# Patient Record
Sex: FEMALE | Race: WHITE | NOT HISPANIC OR LATINO | URBAN - METROPOLITAN AREA
[De-identification: names, ages, dates, MRNs, and addresses within clinical notes are randomized per-mention and may not be internally consistent; named-entity substitution may affect disease eponyms.]

---

## 2017-03-12 ENCOUNTER — EMERGENCY (EMERGENCY)
Facility: HOSPITAL | Age: 82
LOS: 1 days | Discharge: HOME | End: 2017-03-12
Attending: EMERGENCY MEDICINE | Admitting: EMERGENCY MEDICINE
Payer: MEDICARE

## 2017-03-12 VITALS
RESPIRATION RATE: 18 BRPM | SYSTOLIC BLOOD PRESSURE: 191 MMHG | HEIGHT: 62 IN | HEART RATE: 82 BPM | TEMPERATURE: 98 F | DIASTOLIC BLOOD PRESSURE: 82 MMHG | WEIGHT: 149.91 LBS | OXYGEN SATURATION: 99 %

## 2017-03-12 DIAGNOSIS — Z79.2 LONG TERM (CURRENT) USE OF ANTIBIOTICS: ICD-10-CM

## 2017-03-12 DIAGNOSIS — S39.012A STRAIN OF MUSCLE, FASCIA AND TENDON OF LOWER BACK, INITIAL ENCOUNTER: ICD-10-CM

## 2017-03-12 DIAGNOSIS — S39.92XA UNSPECIFIED INJURY OF LOWER BACK, INITIAL ENCOUNTER: ICD-10-CM

## 2017-03-12 DIAGNOSIS — Z79.899 OTHER LONG TERM (CURRENT) DRUG THERAPY: ICD-10-CM

## 2017-03-12 DIAGNOSIS — Z79.1 LONG TERM (CURRENT) USE OF NON-STEROIDAL ANTI-INFLAMMATORIES (NSAID): ICD-10-CM

## 2017-03-12 DIAGNOSIS — E11.9 TYPE 2 DIABETES MELLITUS WITHOUT COMPLICATIONS: ICD-10-CM

## 2017-03-12 LAB
APPEARANCE UR: CLEAR — SIGNIFICANT CHANGE UP
BILIRUB UR-MCNC: NEGATIVE — SIGNIFICANT CHANGE UP
COLOR SPEC: YELLOW — SIGNIFICANT CHANGE UP
DIFF PNL FLD: NEGATIVE — SIGNIFICANT CHANGE UP
GLUCOSE UR QL: NEGATIVE — SIGNIFICANT CHANGE UP
KETONES UR-MCNC: NEGATIVE — SIGNIFICANT CHANGE UP
LEUKOCYTE ESTERASE UR-ACNC: (no result)
NITRITE UR-MCNC: NEGATIVE — SIGNIFICANT CHANGE UP
PH UR: 6.5 — SIGNIFICANT CHANGE UP (ref 4–8.1)
PROT UR-MCNC: NEGATIVE MG/DL — SIGNIFICANT CHANGE UP
SP GR SPEC: <=1.005 — SIGNIFICANT CHANGE UP (ref 1–1.03)
UROBILINOGEN FLD QL: 0.2 E.U./DL — SIGNIFICANT CHANGE UP

## 2017-03-12 PROCEDURE — 73502 X-RAY EXAM HIP UNI 2-3 VIEWS: CPT | Mod: 26

## 2017-03-12 PROCEDURE — 72100 X-RAY EXAM L-S SPINE 2/3 VWS: CPT | Mod: 26

## 2017-03-12 PROCEDURE — 72170 X-RAY EXAM OF PELVIS: CPT | Mod: 26

## 2017-03-12 PROCEDURE — 99284 EMERGENCY DEPT VISIT MOD MDM: CPT | Mod: 25

## 2017-03-12 RX ORDER — IBUPROFEN 200 MG
400 TABLET ORAL ONCE
Qty: 0 | Refills: 0 | Status: COMPLETED | OUTPATIENT
Start: 2017-03-12 | End: 2017-03-12

## 2017-03-12 RX ORDER — TRAMADOL HYDROCHLORIDE 50 MG/1
50 TABLET ORAL ONCE
Qty: 0 | Refills: 0 | Status: DISCONTINUED | OUTPATIENT
Start: 2017-03-12 | End: 2017-03-12

## 2017-03-12 RX ORDER — TRAMADOL HYDROCHLORIDE 50 MG/1
1 TABLET ORAL
Qty: 16 | Refills: 0 | OUTPATIENT
Start: 2017-03-12 | End: 2017-03-16

## 2017-03-12 RX ADMIN — Medication 400 MILLIGRAM(S): at 21:19

## 2017-03-12 RX ADMIN — TRAMADOL HYDROCHLORIDE 50 MILLIGRAM(S): 50 TABLET ORAL at 21:19

## 2017-03-12 NOTE — ED ADULT NURSE NOTE - CHPI ED SYMPTOMS NEG
no neck tenderness/no tingling/no motor function loss/no bowel dysfunction/no bladder dysfunction/no numbness/no constipation

## 2017-03-12 NOTE — ED PROVIDER NOTE - OBJECTIVE STATEMENT
Patient with hx of osteoporosis, NIDDM, presents with L lower back pain, and leg pain. notes while walking her dogs earlier, she tripped forward, and over the course of the day, worsening lower back pain and soreness which radiates down the back of the leg. denies symptoms of UTI, sis dysuria, hematuria, flank pain, abd pain, NV//D, denies rash. notes prior similar back pain with osteoporosis. denies fever, chills, or urinary/bowel incontinence.

## 2017-03-12 NOTE — ED ADULT TRIAGE NOTE - CHIEF COMPLAINT QUOTE
here for lower back pain x 4 days- denies recent injury or trauma, dysuria - c/o injury from 2 years ago

## 2017-03-12 NOTE — ED PROVIDER NOTE - PROGRESS NOTE DETAILS
patient is weight bearing, with no neuro deficits, tolerating POs with no urinary symptoms, and no abd pain. will dc with analgesics. xrays consistent with diffuse djd. has follow up at [facility] .

## 2017-03-14 LAB
CULTURE RESULTS: SIGNIFICANT CHANGE UP
SPECIMEN SOURCE: SIGNIFICANT CHANGE UP

## 2017-07-14 ENCOUNTER — INPATIENT (INPATIENT)
Facility: HOSPITAL | Age: 82
LOS: 3 days | Discharge: HOME HEALTH | DRG: 470 | End: 2017-07-18
Attending: ORTHOPAEDIC SURGERY | Admitting: ORTHOPAEDIC SURGERY
Payer: MEDICARE

## 2017-07-14 VITALS
RESPIRATION RATE: 16 BRPM | DIASTOLIC BLOOD PRESSURE: 78 MMHG | TEMPERATURE: 98 F | HEART RATE: 97 BPM | OXYGEN SATURATION: 100 % | SYSTOLIC BLOOD PRESSURE: 199 MMHG

## 2017-07-14 LAB
ALBUMIN SERPL ELPH-MCNC: 4.1 G/DL — SIGNIFICANT CHANGE UP (ref 3.4–5)
ALP SERPL-CCNC: 108 U/L — SIGNIFICANT CHANGE UP (ref 40–120)
ALT FLD-CCNC: 22 U/L — SIGNIFICANT CHANGE UP (ref 12–42)
ANION GAP SERPL CALC-SCNC: 9 MMOL/L — SIGNIFICANT CHANGE UP (ref 9–16)
APPEARANCE UR: CLEAR — SIGNIFICANT CHANGE UP
APTT BLD: 26.4 SEC — LOW (ref 27.5–36.5)
AST SERPL-CCNC: 26 U/L — SIGNIFICANT CHANGE UP (ref 15–37)
BASOPHILS NFR BLD AUTO: 0.3 % — SIGNIFICANT CHANGE UP (ref 0–2)
BILIRUB SERPL-MCNC: 0.7 MG/DL — SIGNIFICANT CHANGE UP (ref 0.2–1.2)
BILIRUB UR-MCNC: NEGATIVE — SIGNIFICANT CHANGE UP
BUN SERPL-MCNC: 13 MG/DL — SIGNIFICANT CHANGE UP (ref 7–23)
CALCIUM SERPL-MCNC: 9.5 MG/DL — SIGNIFICANT CHANGE UP (ref 8.5–10.5)
CHLORIDE SERPL-SCNC: 102 MMOL/L — SIGNIFICANT CHANGE UP (ref 96–108)
CK MB BLD-MCNC: 1.67 % — SIGNIFICANT CHANGE UP
CK MB CFR SERPL CALC: 2.1 NG/ML — SIGNIFICANT CHANGE UP (ref 0.5–3.6)
CO2 SERPL-SCNC: 28 MMOL/L — SIGNIFICANT CHANGE UP (ref 22–31)
COLOR SPEC: YELLOW — SIGNIFICANT CHANGE UP
CREAT SERPL-MCNC: 0.87 MG/DL — SIGNIFICANT CHANGE UP (ref 0.5–1.3)
DIFF PNL FLD: (no result)
EOSINOPHIL NFR BLD AUTO: 0.1 % — SIGNIFICANT CHANGE UP (ref 0–6)
GLUCOSE SERPL-MCNC: 247 MG/DL — HIGH (ref 70–99)
GLUCOSE UR QL: 250
HCT VFR BLD CALC: 40 % — SIGNIFICANT CHANGE UP (ref 34.5–45)
HGB BLD-MCNC: 13.4 G/DL — SIGNIFICANT CHANGE UP (ref 11.5–15.5)
IMM GRANULOCYTES NFR BLD AUTO: 0.6 % — SIGNIFICANT CHANGE UP (ref 0–1.5)
INR BLD: 1 — SIGNIFICANT CHANGE UP (ref 0.88–1.16)
KETONES UR-MCNC: (no result) MG/DL
LEUKOCYTE ESTERASE UR-ACNC: NEGATIVE — SIGNIFICANT CHANGE UP
LYMPHOCYTES # BLD AUTO: 5 % — LOW (ref 13–44)
MCHC RBC-ENTMCNC: 28.6 PG — SIGNIFICANT CHANGE UP (ref 27–34)
MCHC RBC-ENTMCNC: 33.5 G/DL — SIGNIFICANT CHANGE UP (ref 32–36)
MCV RBC AUTO: 85.3 FL — SIGNIFICANT CHANGE UP (ref 80–100)
MONOCYTES NFR BLD AUTO: 2.6 % — SIGNIFICANT CHANGE UP (ref 2–14)
NEUTROPHILS NFR BLD AUTO: 91.4 % — HIGH (ref 43–77)
NITRITE UR-MCNC: NEGATIVE — SIGNIFICANT CHANGE UP
PH UR: 7.5 — SIGNIFICANT CHANGE UP (ref 5–8)
PLATELET # BLD AUTO: 271 K/UL — SIGNIFICANT CHANGE UP (ref 150–400)
POTASSIUM SERPL-MCNC: 3.6 MMOL/L — SIGNIFICANT CHANGE UP (ref 3.5–5.3)
POTASSIUM SERPL-SCNC: 3.6 MMOL/L — SIGNIFICANT CHANGE UP (ref 3.5–5.3)
PROT SERPL-MCNC: 7.8 G/DL — SIGNIFICANT CHANGE UP (ref 6.4–8.2)
PROT UR-MCNC: (no result) MG/DL
PROTHROM AB SERPL-ACNC: 11 SEC — SIGNIFICANT CHANGE UP (ref 9.8–12.7)
RBC # BLD: 4.69 M/UL — SIGNIFICANT CHANGE UP (ref 3.8–5.2)
RBC # FLD: 13.7 % — SIGNIFICANT CHANGE UP (ref 10.3–16.9)
SODIUM SERPL-SCNC: 139 MMOL/L — SIGNIFICANT CHANGE UP (ref 132–145)
SP GR SPEC: 1.01 — SIGNIFICANT CHANGE UP (ref 1–1.03)
TROPONIN I SERPL-MCNC: <0.017 NG/ML — LOW (ref 0.02–0.06)
UROBILINOGEN FLD QL: 0.2 E.U./DL — SIGNIFICANT CHANGE UP
WBC # BLD: 21.5 K/UL — HIGH (ref 3.8–10.5)
WBC # FLD AUTO: 21.5 K/UL — HIGH (ref 3.8–10.5)

## 2017-07-14 PROCEDURE — 93010 ELECTROCARDIOGRAM REPORT: CPT

## 2017-07-14 PROCEDURE — 71010: CPT | Mod: 26

## 2017-07-14 PROCEDURE — 73552 X-RAY EXAM OF FEMUR 2/>: CPT | Mod: 26,LT

## 2017-07-14 PROCEDURE — 73502 X-RAY EXAM HIP UNI 2-3 VIEWS: CPT | Mod: 26,LT

## 2017-07-14 PROCEDURE — 99285 EMERGENCY DEPT VISIT HI MDM: CPT | Mod: 25

## 2017-07-14 RX ORDER — ACETAMINOPHEN 500 MG
650 TABLET ORAL EVERY 6 HOURS
Qty: 0 | Refills: 0 | Status: DISCONTINUED | OUTPATIENT
Start: 2017-07-14 | End: 2017-07-18

## 2017-07-14 RX ORDER — HYDROMORPHONE HYDROCHLORIDE 2 MG/ML
0.5 INJECTION INTRAMUSCULAR; INTRAVENOUS; SUBCUTANEOUS ONCE
Qty: 0 | Refills: 0 | Status: DISCONTINUED | OUTPATIENT
Start: 2017-07-14 | End: 2017-07-14

## 2017-07-14 RX ORDER — MORPHINE SULFATE 50 MG/1
4 CAPSULE, EXTENDED RELEASE ORAL ONCE
Qty: 0 | Refills: 0 | Status: DISCONTINUED | OUTPATIENT
Start: 2017-07-14 | End: 2017-07-14

## 2017-07-14 RX ORDER — DOCUSATE SODIUM 100 MG
100 CAPSULE ORAL THREE TIMES A DAY
Qty: 0 | Refills: 0 | Status: DISCONTINUED | OUTPATIENT
Start: 2017-07-14 | End: 2017-07-18

## 2017-07-14 RX ORDER — MAGNESIUM HYDROXIDE 400 MG/1
30 TABLET, CHEWABLE ORAL DAILY
Qty: 0 | Refills: 0 | Status: DISCONTINUED | OUTPATIENT
Start: 2017-07-14 | End: 2017-07-18

## 2017-07-14 RX ORDER — OXYCODONE HYDROCHLORIDE 5 MG/1
5 TABLET ORAL EVERY 4 HOURS
Qty: 0 | Refills: 0 | Status: DISCONTINUED | OUTPATIENT
Start: 2017-07-14 | End: 2017-07-18

## 2017-07-14 RX ORDER — SODIUM CHLORIDE 9 MG/ML
500 INJECTION INTRAMUSCULAR; INTRAVENOUS; SUBCUTANEOUS ONCE
Qty: 0 | Refills: 0 | Status: COMPLETED | OUTPATIENT
Start: 2017-07-14 | End: 2017-07-14

## 2017-07-14 RX ORDER — SODIUM CHLORIDE 9 MG/ML
1000 INJECTION, SOLUTION INTRAVENOUS
Qty: 0 | Refills: 0 | Status: DISCONTINUED | OUTPATIENT
Start: 2017-07-14 | End: 2017-07-15

## 2017-07-14 RX ORDER — OXYCODONE HYDROCHLORIDE 5 MG/1
10 TABLET ORAL EVERY 4 HOURS
Qty: 0 | Refills: 0 | Status: DISCONTINUED | OUTPATIENT
Start: 2017-07-14 | End: 2017-07-18

## 2017-07-14 RX ORDER — MORPHINE SULFATE 50 MG/1
4 CAPSULE, EXTENDED RELEASE ORAL EVERY 4 HOURS
Qty: 0 | Refills: 0 | Status: DISCONTINUED | OUTPATIENT
Start: 2017-07-14 | End: 2017-07-18

## 2017-07-14 RX ADMIN — OXYCODONE HYDROCHLORIDE 5 MILLIGRAM(S): 5 TABLET ORAL at 22:39

## 2017-07-14 RX ADMIN — SODIUM CHLORIDE 500 MILLILITER(S): 9 INJECTION INTRAMUSCULAR; INTRAVENOUS; SUBCUTANEOUS at 17:34

## 2017-07-14 RX ADMIN — HYDROMORPHONE HYDROCHLORIDE 0.5 MILLIGRAM(S): 2 INJECTION INTRAMUSCULAR; INTRAVENOUS; SUBCUTANEOUS at 18:15

## 2017-07-14 RX ADMIN — HYDROMORPHONE HYDROCHLORIDE 0.5 MILLIGRAM(S): 2 INJECTION INTRAMUSCULAR; INTRAVENOUS; SUBCUTANEOUS at 19:39

## 2017-07-14 RX ADMIN — MORPHINE SULFATE 4 MILLIGRAM(S): 50 CAPSULE, EXTENDED RELEASE ORAL at 19:39

## 2017-07-14 RX ADMIN — OXYCODONE HYDROCHLORIDE 5 MILLIGRAM(S): 5 TABLET ORAL at 22:09

## 2017-07-14 RX ADMIN — MORPHINE SULFATE 4 MILLIGRAM(S): 50 CAPSULE, EXTENDED RELEASE ORAL at 16:44

## 2017-07-14 RX ADMIN — SODIUM CHLORIDE 500 MILLILITER(S): 9 INJECTION INTRAMUSCULAR; INTRAVENOUS; SUBCUTANEOUS at 18:14

## 2017-07-14 NOTE — H&P ADULT - HISTORY OF PRESENT ILLNESS
Patient is a fairly healthy 82 year old lady who sustained a fall today and landed on her Left hip. The fall was from ground level and since then the patient is not able to bear weight and has been in tremendous amount of pain.

## 2017-07-14 NOTE — H&P ADULT - NSHPPHYSICALEXAM_GEN_ALL_CORE
LE    WWP, BCR  DP/PT  Motor: EHL/FHL/GS/AT  Sensory: DP/SP/AT, MP/LP/S/S SILT  ROM: Painful ROM about L hip  Deformity: Shortened and externally rotated.

## 2017-07-14 NOTE — ED ADULT NURSE NOTE - OBJECTIVE STATEMENT
Pt came in c/o left hip pain s/p mechanical fall x2 hours prior to arrival. Pt reports pain 9/10, unable to move left leg, unable to ambulate. Pt denies LOC, no use of blood thinners, no hitting head, no CP/SOB, no dizziness or disorientation.

## 2017-07-14 NOTE — ED PROVIDER NOTE - DIAGNOSTIC INTERPRETATION
xray chest 1 v: no PTX, no infiltrates, no cardiomegaly  xray hip/pelvis: 4 view: impacted displced L subcapital fx  xray femur: 2v: subcapital fx, no soft tissue swelling, no other deformity of femur

## 2017-07-14 NOTE — ED PROVIDER NOTE - OBJECTIVE STATEMENT
PMHx DM, osteoporosis presents with L hip pain. s/p mechanical trip and fall in her apartment 2 hours prior to arrival. denies head trauma, LOC, dizziness, neck pain, back pain. patient was unable to bear weight after injury. denies paresthesias or focal weakness. unable to move L leg due to pain

## 2017-07-14 NOTE — H&P ADULT - ASSESSMENT
82 year old lady with L FNF  1. Admit to Ortho  2. Pre Op work up  3. Pre Op clearance.  4. NPO p MN  5. SCDs

## 2017-07-14 NOTE — H&P ADULT - NSHPLABSRESULTS_GEN_ALL_CORE
13.4   21.5  )-----------( 271      ( 14 Jul 2017 16:43 )             40.0       07-14    139  |  102  |  13  ----------------------------<  247<H>  3.6   |  28  |  0.87    Ca    9.5      14 Jul 2017 16:43    TPro  7.8  /  Alb  4.1  /  TBili  0.7  /  DBili  x   /  AST  26  /  ALT  22  /  AlkPhos  108  07-14

## 2017-07-14 NOTE — ED PROVIDER NOTE - ATTENDING CONTRIBUTION TO CARE
83 yo female s/p trip and fall with left hip pain.  exam: no head injury, aao x 3, +shortening/ext rotation left le with pt tenderness left greater trochanter, Distal nvi lle.  xray: +subcapital femur fx.  Plan: admit [facility] ortho.

## 2017-07-15 ENCOUNTER — TRANSCRIPTION ENCOUNTER (OUTPATIENT)
Age: 82
End: 2017-07-15

## 2017-07-15 DIAGNOSIS — S72.009A FRACTURE OF UNSPECIFIED PART OF NECK OF UNSPECIFIED FEMUR, INITIAL ENCOUNTER FOR CLOSED FRACTURE: ICD-10-CM

## 2017-07-15 DIAGNOSIS — E11.9 TYPE 2 DIABETES MELLITUS WITHOUT COMPLICATIONS: ICD-10-CM

## 2017-07-15 DIAGNOSIS — Z01.818 ENCOUNTER FOR OTHER PREPROCEDURAL EXAMINATION: ICD-10-CM

## 2017-07-15 DIAGNOSIS — D72.829 ELEVATED WHITE BLOOD CELL COUNT, UNSPECIFIED: ICD-10-CM

## 2017-07-15 LAB
ANION GAP SERPL CALC-SCNC: 12 MMOL/L — SIGNIFICANT CHANGE UP (ref 5–17)
BLD GP AB SCN SERPL QL: NEGATIVE — SIGNIFICANT CHANGE UP
BUN SERPL-MCNC: 11 MG/DL — SIGNIFICANT CHANGE UP (ref 7–23)
CALCIUM SERPL-MCNC: 8.8 MG/DL — SIGNIFICANT CHANGE UP (ref 8.4–10.5)
CHLORIDE SERPL-SCNC: 96 MMOL/L — SIGNIFICANT CHANGE UP (ref 96–108)
CO2 SERPL-SCNC: 24 MMOL/L — SIGNIFICANT CHANGE UP (ref 22–31)
CREAT SERPL-MCNC: 0.7 MG/DL — SIGNIFICANT CHANGE UP (ref 0.5–1.3)
GLUCOSE SERPL-MCNC: 168 MG/DL — HIGH (ref 70–99)
HCT VFR BLD CALC: 32.4 % — LOW (ref 34.5–45)
HCT VFR BLD CALC: 36.3 % — SIGNIFICANT CHANGE UP (ref 34.5–45)
HGB BLD-MCNC: 11.2 G/DL — LOW (ref 11.5–15.5)
HGB BLD-MCNC: 12 G/DL — SIGNIFICANT CHANGE UP (ref 11.5–15.5)
MCHC RBC-ENTMCNC: 28 PG — SIGNIFICANT CHANGE UP (ref 27–34)
MCHC RBC-ENTMCNC: 29 PG — SIGNIFICANT CHANGE UP (ref 27–34)
MCHC RBC-ENTMCNC: 33.1 G/DL — SIGNIFICANT CHANGE UP (ref 32–36)
MCHC RBC-ENTMCNC: 34.6 G/DL — SIGNIFICANT CHANGE UP (ref 32–36)
MCV RBC AUTO: 83.9 FL — SIGNIFICANT CHANGE UP (ref 80–100)
MCV RBC AUTO: 84.8 FL — SIGNIFICANT CHANGE UP (ref 80–100)
PLATELET # BLD AUTO: 220 K/UL — SIGNIFICANT CHANGE UP (ref 150–400)
PLATELET # BLD AUTO: 244 K/UL — SIGNIFICANT CHANGE UP (ref 150–400)
POTASSIUM SERPL-MCNC: 3.9 MMOL/L — SIGNIFICANT CHANGE UP (ref 3.5–5.3)
POTASSIUM SERPL-SCNC: 3.9 MMOL/L — SIGNIFICANT CHANGE UP (ref 3.5–5.3)
RBC # BLD: 3.86 M/UL — SIGNIFICANT CHANGE UP (ref 3.8–5.2)
RBC # BLD: 4.28 M/UL — SIGNIFICANT CHANGE UP (ref 3.8–5.2)
RBC # FLD: 13.8 % — SIGNIFICANT CHANGE UP (ref 10.3–16.9)
RBC # FLD: 14.1 % — SIGNIFICANT CHANGE UP (ref 10.3–16.9)
RH IG SCN BLD-IMP: POSITIVE — SIGNIFICANT CHANGE UP
SODIUM SERPL-SCNC: 132 MMOL/L — LOW (ref 135–145)
WBC # BLD: 13 K/UL — HIGH (ref 3.8–10.5)
WBC # BLD: 19.1 K/UL — HIGH (ref 3.8–10.5)
WBC # FLD AUTO: 13 K/UL — HIGH (ref 3.8–10.5)
WBC # FLD AUTO: 19.1 K/UL — HIGH (ref 3.8–10.5)

## 2017-07-15 PROCEDURE — 72170 X-RAY EXAM OF PELVIS: CPT | Mod: 26

## 2017-07-15 PROCEDURE — 99223 1ST HOSP IP/OBS HIGH 75: CPT | Mod: GC

## 2017-07-15 RX ORDER — DEXTROSE 50 % IN WATER 50 %
25 SYRINGE (ML) INTRAVENOUS ONCE
Qty: 0 | Refills: 0 | Status: DISCONTINUED | OUTPATIENT
Start: 2017-07-15 | End: 2017-07-18

## 2017-07-15 RX ORDER — SENNA PLUS 8.6 MG/1
2 TABLET ORAL AT BEDTIME
Qty: 0 | Refills: 0 | Status: DISCONTINUED | OUTPATIENT
Start: 2017-07-15 | End: 2017-07-18

## 2017-07-15 RX ORDER — ASPIRIN/CALCIUM CARB/MAGNESIUM 324 MG
325 TABLET ORAL
Qty: 0 | Refills: 0 | Status: DISCONTINUED | OUTPATIENT
Start: 2017-07-15 | End: 2017-07-18

## 2017-07-15 RX ORDER — POLYETHYLENE GLYCOL 3350 17 G/17G
17 POWDER, FOR SOLUTION ORAL DAILY
Qty: 0 | Refills: 0 | Status: DISCONTINUED | OUTPATIENT
Start: 2017-07-15 | End: 2017-07-18

## 2017-07-15 RX ORDER — SODIUM CHLORIDE 9 MG/ML
1000 INJECTION, SOLUTION INTRAVENOUS
Qty: 0 | Refills: 0 | Status: DISCONTINUED | OUTPATIENT
Start: 2017-07-15 | End: 2017-07-18

## 2017-07-15 RX ORDER — BENZOCAINE AND MENTHOL 5; 1 G/100ML; G/100ML
1 LIQUID ORAL EVERY 4 HOURS
Qty: 0 | Refills: 0 | Status: DISCONTINUED | OUTPATIENT
Start: 2017-07-15 | End: 2017-07-18

## 2017-07-15 RX ORDER — GLUCAGON INJECTION, SOLUTION 0.5 MG/.1ML
1 INJECTION, SOLUTION SUBCUTANEOUS ONCE
Qty: 0 | Refills: 0 | Status: DISCONTINUED | OUTPATIENT
Start: 2017-07-15 | End: 2017-07-18

## 2017-07-15 RX ORDER — ACETAMINOPHEN 500 MG
650 TABLET ORAL EVERY 6 HOURS
Qty: 0 | Refills: 0 | Status: DISCONTINUED | OUTPATIENT
Start: 2017-07-15 | End: 2017-07-15

## 2017-07-15 RX ORDER — ONDANSETRON 8 MG/1
4 TABLET, FILM COATED ORAL EVERY 4 HOURS
Qty: 0 | Refills: 0 | Status: DISCONTINUED | OUTPATIENT
Start: 2017-07-15 | End: 2017-07-18

## 2017-07-15 RX ORDER — CEFAZOLIN SODIUM 1 G
2000 VIAL (EA) INJECTION EVERY 8 HOURS
Qty: 0 | Refills: 0 | Status: COMPLETED | OUTPATIENT
Start: 2017-07-15 | End: 2017-07-16

## 2017-07-15 RX ORDER — DEXTROSE 50 % IN WATER 50 %
12.5 SYRINGE (ML) INTRAVENOUS ONCE
Qty: 0 | Refills: 0 | Status: DISCONTINUED | OUTPATIENT
Start: 2017-07-15 | End: 2017-07-18

## 2017-07-15 RX ORDER — OXYCODONE HYDROCHLORIDE 5 MG/1
10 TABLET ORAL EVERY 4 HOURS
Qty: 0 | Refills: 0 | Status: DISCONTINUED | OUTPATIENT
Start: 2017-07-15 | End: 2017-07-15

## 2017-07-15 RX ORDER — CELECOXIB 200 MG/1
200 CAPSULE ORAL
Qty: 0 | Refills: 0 | Status: DISCONTINUED | OUTPATIENT
Start: 2017-07-15 | End: 2017-07-18

## 2017-07-15 RX ORDER — DOCUSATE SODIUM 100 MG
100 CAPSULE ORAL THREE TIMES A DAY
Qty: 0 | Refills: 0 | Status: DISCONTINUED | OUTPATIENT
Start: 2017-07-15 | End: 2017-07-15

## 2017-07-15 RX ORDER — MORPHINE SULFATE 50 MG/1
4 CAPSULE, EXTENDED RELEASE ORAL
Qty: 0 | Refills: 0 | Status: DISCONTINUED | OUTPATIENT
Start: 2017-07-15 | End: 2017-07-18

## 2017-07-15 RX ORDER — INSULIN LISPRO 100/ML
VIAL (ML) SUBCUTANEOUS AT BEDTIME
Qty: 0 | Refills: 0 | Status: DISCONTINUED | OUTPATIENT
Start: 2017-07-15 | End: 2017-07-15

## 2017-07-15 RX ORDER — INSULIN LISPRO 100/ML
VIAL (ML) SUBCUTANEOUS
Qty: 0 | Refills: 0 | Status: DISCONTINUED | OUTPATIENT
Start: 2017-07-15 | End: 2017-07-18

## 2017-07-15 RX ORDER — KETOROLAC TROMETHAMINE 30 MG/ML
30 SYRINGE (ML) INJECTION EVERY 6 HOURS
Qty: 0 | Refills: 0 | Status: DISCONTINUED | OUTPATIENT
Start: 2017-07-15 | End: 2017-07-16

## 2017-07-15 RX ORDER — BUPIVACAINE 13.3 MG/ML
20 INJECTION, SUSPENSION, LIPOSOMAL INFILTRATION ONCE
Qty: 0 | Refills: 0 | Status: DISCONTINUED | OUTPATIENT
Start: 2017-07-15 | End: 2017-07-18

## 2017-07-15 RX ORDER — OXYCODONE HYDROCHLORIDE 5 MG/1
5 TABLET ORAL EVERY 4 HOURS
Qty: 0 | Refills: 0 | Status: DISCONTINUED | OUTPATIENT
Start: 2017-07-15 | End: 2017-07-15

## 2017-07-15 RX ORDER — MAGNESIUM HYDROXIDE 400 MG/1
30 TABLET, CHEWABLE ORAL DAILY
Qty: 0 | Refills: 0 | Status: DISCONTINUED | OUTPATIENT
Start: 2017-07-15 | End: 2017-07-15

## 2017-07-15 RX ORDER — DEXTROSE 50 % IN WATER 50 %
1 SYRINGE (ML) INTRAVENOUS ONCE
Qty: 0 | Refills: 0 | Status: DISCONTINUED | OUTPATIENT
Start: 2017-07-15 | End: 2017-07-18

## 2017-07-15 RX ORDER — MORPHINE SULFATE 50 MG/1
4 CAPSULE, EXTENDED RELEASE ORAL EVERY 4 HOURS
Qty: 0 | Refills: 0 | Status: DISCONTINUED | OUTPATIENT
Start: 2017-07-15 | End: 2017-07-15

## 2017-07-15 RX ADMIN — Medication 325 MILLIGRAM(S): at 17:23

## 2017-07-15 RX ADMIN — Medication 100 MILLIGRAM(S): at 21:31

## 2017-07-15 RX ADMIN — Medication 30 MILLIGRAM(S): at 18:00

## 2017-07-15 RX ADMIN — CELECOXIB 200 MILLIGRAM(S): 200 CAPSULE ORAL at 18:00

## 2017-07-15 RX ADMIN — Medication 100 MILLIGRAM(S): at 18:47

## 2017-07-15 RX ADMIN — OXYCODONE HYDROCHLORIDE 10 MILLIGRAM(S): 5 TABLET ORAL at 02:11

## 2017-07-15 RX ADMIN — OXYCODONE HYDROCHLORIDE 10 MILLIGRAM(S): 5 TABLET ORAL at 02:39

## 2017-07-15 RX ADMIN — POLYETHYLENE GLYCOL 3350 17 GRAM(S): 17 POWDER, FOR SOLUTION ORAL at 17:20

## 2017-07-15 RX ADMIN — Medication 4: at 12:30

## 2017-07-15 RX ADMIN — Medication 30 MILLIGRAM(S): at 17:22

## 2017-07-15 RX ADMIN — CELECOXIB 200 MILLIGRAM(S): 200 CAPSULE ORAL at 17:23

## 2017-07-15 RX ADMIN — Medication 2: at 21:40

## 2017-07-15 RX ADMIN — Medication 2: at 17:20

## 2017-07-15 NOTE — DISCHARGE NOTE ADULT - CARE PLAN
Principal Discharge DX:	Hip fracture  Goal:	improvement after surgery  Instructions for follow-up, activity and diet:	see below

## 2017-07-15 NOTE — DISCHARGE NOTE ADULT - MEDICATION SUMMARY - MEDICATIONS TO STOP TAKING
I will STOP taking the medications listed below when I get home from the hospital:    Levaquin 500 mg oral tablet  -- 1 tab(s) by mouth every 24 hours  -- Avoid prolonged or excessive exposure to direct and/or artificial sunlight while taking this medication.  Do not take dairy products, antacids, or iron preparations within one hour of this medication.  Finish all this medication unless otherwise directed by prescriber.  May cause drowsiness or dizziness.  Medication should be taken with plenty of water.    Bromfed DM 2 mg-10 mg-30 mg/5 mL oral syrup  -- 5 milliliter(s) by mouth every 6 hours PRN cough/congestion  -- May cause drowsiness.  Alcohol may intensify this effect.  Use care when operating dangerous machinery.  Obtain medical advice before taking any non-prescription drugs as some may affect the action of this medication.    ibuprofen 200 mg oral capsule  -- 2 cap(s) by mouth every 6 hours, As Needed - for moderate pain  -- Chew tablets before swallowing  Do not take this drug if you are pregnant.  It is very important that you take or use this exactly as directed.  Do not skip doses or discontinue unless directed by your doctor.  May cause drowsiness or dizziness.  Obtain medical advice before taking any non-prescription drugs as some may affect the action of this medication.  Take with food or milk.    cephalexin 500 mg oral capsule  -- 1 cap(s) by mouth 2 times a day  -- Finish all this medication unless otherwise directed by prescriber.    traMADol 50 mg oral tablet  -- 1 tab(s) by mouth every 6 hours -for moderate pain MDD:4  -- Caution federal law prohibits the transfer of this drug to any person other  than the person for whom it was prescribed.  May cause drowsiness.  Alcohol may intensify this effect.  Use care when operating dangerous machinery.  Obtain medical advice before taking any non-prescription drugs as some may affect the action of this medication.

## 2017-07-15 NOTE — CONSULT NOTE ADULT - PROBLEM SELECTOR RECOMMENDATION 2
BS is uncontrolled and she does not measure her BS at home.   Hold oral agent and continue insulin sliding scale.  The aim is to maintain -180 and not below 70.   Advance diet postop and will adjust her regimen

## 2017-07-15 NOTE — DISCHARGE NOTE ADULT - CARE PROVIDER_API CALL
Omar William), Orthopaedic Surgery  [address] [place] [address]  Phone: [phone]  Fax: [phone]

## 2017-07-15 NOTE — CONSULT NOTE ADULT - SUBJECTIVE AND OBJECTIVE BOX
Patient is a 82y old  Female who presents with a chief complaint of L hip pain, L FNF Garden Type 4 (2017 22:09)      HPI:  Patient is a fairly healthy 82 year old lady who sustained a fall today and landed on her Left hip. The fall was from ground level and since then the patient is not able to bear weight and has been in tremendous amount of pain. (2017 22:09)      PAST MEDICAL & SURGICAL HISTORY:  Diabetes  No significant past surgical history      FAMILY HISTORY:  No pertinent family history in first degree relatives      SOCIAL HISTORY:  Smoking Status: [ ] Current, [ ] Former, [ ] Never  Pack Years:    MEDICATIONS:  Pulmonary:    Antimicrobials:    Anticoagulants:    Onc:    GI/:  magnesium hydroxide Suspension 30 milliLiter(s) Oral daily PRN  docusate sodium 100 milliGRAM(s) Oral three times a day    Endocrine:    Cardiac:    Other Medications:  lactated ringers. 1000 milliLiter(s) IV Continuous <Continuous>  acetaminophen   Tablet 650 milliGRAM(s) Oral every 6 hours PRN  acetaminophen   Tablet. 650 milliGRAM(s) Oral every 6 hours PRN  oxyCODONE    IR 10 milliGRAM(s) Oral every 4 hours PRN  oxyCODONE    IR 5 milliGRAM(s) Oral every 4 hours PRN  morphine  - Injectable 4 milliGRAM(s) IV Push every 4 hours PRN      Allergies    No Known Allergies    Intolerances        Vital Signs Last 24 Hrs  T(C): 37.3 (15 Jul 2017 05:10), Max: 37.3 (15 Jul 2017 05:10)  T(F): 99.2 (15 Jul 2017 05:10), Max: 99.2 (15 Jul 2017 05:10)  HR: 89 (15 Jul 2017 05:10) (86 - 97)  BP: 176/82 (15 Jul 2017 05:10) (168/82 - 199/78)  BP(mean): --  RR: 16 (15 Jul 2017 05:10) (16 - 18)  SpO2: 96% (15 Jul 2017 05:10) (96% - 100%)        LABS:      CBC Full  -  ( 2017 16:43 )  WBC Count : 21.5 K/uL  Hemoglobin : 13.4 g/dL  Hematocrit : 40.0 %  Platelet Count - Automated : 271 K/uL  Mean Cell Volume : 85.3 fL  Mean Cell Hemoglobin : 28.6 pg  Mean Cell Hemoglobin Concentration : 33.5 g/dL  Auto Neutrophil # : x  Auto Lymphocyte # : x  Auto Monocyte # : x  Auto Eosinophil # : x  Auto Basophil # : x  Auto Neutrophil % : 91.4 %  Auto Lymphocyte % : 5.0 %  Auto Monocyte % : 2.6 %  Auto Eosinophil % : 0.1 %  Auto Basophil % : 0.3 %        139  |  102  |  13  ----------------------------<  247<H>  3.6   |  28  |  0.87    Ca    9.5      2017 16:43    TPro  7.8  /  Alb  4.1  /  TBili  0.7  /  DBili  x   /  AST  26  /  ALT  22  /  AlkPhos  108      PT/INR - ( 2017 16:43 )   PT: 11.0 sec;   INR: 1.00          PTT - ( 2017 16:43 )  PTT:26.4 sec      Urinalysis Basic - ( 2017 18:17 )    Color: Yellow / Appearance: Clear / S.010 / pH: x  Gluc: x / Ketone: Trace mg/dL  / Bili: NEGATIVE / Urobili: 0.2 E.U./dL   Blood: x / Protein: Trace mg/dL / Nitrite: NEGATIVE   Leuk Esterase: NEGATIVE / RBC: < 5 /HPF / WBC < 5 /HPF   Sq Epi: x / Non Sq Epi: Rare /HPF / Bacteria: Few /HPF  EKG RSR normal  CXR  clear                RADIOLOGY & ADDITIONAL STUDIES (The following images were personally reviewed):

## 2017-07-15 NOTE — DISCHARGE NOTE ADULT - PATIENT PORTAL LINK FT
“You can access the Follow[employer] Patient Portal, offered by [facility], by registering with the following website: [email or web address]”

## 2017-07-15 NOTE — DISCHARGE NOTE ADULT - MEDICATION SUMMARY - MEDICATIONS TO TAKE
I will START or STAY ON the medications listed below when I get home from the hospital:    aspirin 325 mg oral delayed release tablet  -- 1 tab(s) by mouth 2 times a day for 4 weeks from date of surgery.  -- Indication: For clot prevention    meloxicam 15 mg oral tablet  -- 1 tab(s) by mouth once a day  -- Do not take this drug if you are pregnant.  Obtain medical advice before taking any non-prescription drugs as some may affect the action of this medication.  Take with food or milk.    -- Indication: For Pain    acetaminophen-oxycodone 325 mg-5 mg oral tablet  -- 1 to 2 tab(s) by mouth every 4 hours, As Needed MDD:eight  -- Caution federal law prohibits the transfer of this drug to any person other  than the person for whom it was prescribed.  May cause drowsiness.  Alcohol may intensify this effect.  Use care when operating dangerous machinery.  This prescription cannot be refilled.  This product contains acetaminophen.  Do not use  with any other product containing acetaminophen to prevent possible liver damage.  Using more of this medication than prescribed may cause serious breathing problems.    -- Indication: For Pain    senna oral tablet  -- 2 tab(s) by mouth once a day (at bedtime), As needed, Constipation  -- Indication: For constipation    docusate sodium 100 mg oral capsule  -- 1 cap(s) by mouth 3 times a day  -- Indication: For constipation I will START or STAY ON the medications listed below when I get home from the hospital:    aspirin 325 mg oral delayed release tablet  -- 1 tab(s) by mouth 2 times a day for 4 weeks from date of surgery.  -- Indication: For clot prevention    meloxicam 15 mg oral tablet  -- 1 tab(s) by mouth once a day  -- Do not take this drug if you are pregnant.  Obtain medical advice before taking any non-prescription drugs as some may affect the action of this medication.  Take with food or milk.    -- Indication: For Pain    acetaminophen-oxycodone 325 mg-5 mg oral tablet  -- 1 to 2 tab(s) by mouth every 4 hours, As Needed MDD:eight  -- Caution federal law prohibits the transfer of this drug to any person other  than the person for whom it was prescribed.  May cause drowsiness.  Alcohol may intensify this effect.  Use care when operating dangerous machinery.  This prescription cannot be refilled.  This product contains acetaminophen.  Do not use  with any other product containing acetaminophen to prevent possible liver damage.  Using more of this medication than prescribed may cause serious breathing problems.    -- Indication: For Pain    Aspirin Enteric Coated 325 mg oral delayed release tablet  -- 1 tab(s) by mouth 2 times a day  -- Swallow whole.  Do not crush.  Take with food or milk.    -- Indication: For clot prevention    senna oral tablet  -- 2 tab(s) by mouth once a day (at bedtime), As needed, Constipation  -- Indication: For constipation    docusate sodium 100 mg oral capsule  -- 1 cap(s) by mouth 3 times a day  -- Indication: For constipation    Colace sodium 100 mg oral capsule  -- 1 cap(s) by mouth 3 times a day, As Needed  -- Medication should be taken with plenty of water.    -- Indication: For constipation

## 2017-07-15 NOTE — DISCHARGE NOTE ADULT - HOSPITAL COURSE
Admitted  Surgery - Left hip myron 7/15/17  Perioperative abx  Pain control  DVT ppx  PT consult  Med consult

## 2017-07-15 NOTE — CONSULT NOTE ADULT - PROBLEM SELECTOR RECOMMENDATION 4
The patient's medical condition is optimized for surgery.  There is no contraindication for surgery.  There is no clinical evidence neither of angina, decompensated CHF, arrhthymias, nor valvular disease.   There is no limitation of exercise capacity.  MET is 7 ( bikes and walks daily) .  ASA class is2 .  More[place] cardiac risk factor is low .  DVT prophylaxis is indicated.  Pain control.  Early mobilization.  Avoid fluid overload.

## 2017-07-15 NOTE — DISCHARGE NOTE ADULT - ADDITIONAL INSTRUCTIONS
No strenuous activity, heavy lifting, driving, tub bathing, or returning to work until cleared by MD.  You may shower--dressing is waterproof.  Remove dressing after post op day 7, then leave incision open to air.  Follow up with Dr. William in his office in 2 weeks from surgery.  Any staples/sutures will be removed in 2 weeks.  If you don't have a bowel movement by post op day 3, then take Milk of Magnesia (over the counter).  If no bowel movement by at least post op day 5, then use a Dulcolax suppository (over the counter) and/or a Fleets enema--if still no bowel movement, call your MD.  Contact your doctor if you experience: fever greater than 101.5, chills, chest pain, difficulty breathing, bleeding, redness or heat around the incision.    Please follow up with your primary care provider. No strenuous activity, heavy lifting, driving, tub bathing, or returning to work until cleared by MD.  You may shower--dressing is waterproof.  Remove dressing after post op day 7, then leave incision open to air.  Please take the Aspirin for 4 weeks from the date of surgery to prevent blood clots.   Follow up with Dr. William in his office in 2 weeks from surgery.  Any staples/sutures will be removed in 2 weeks.  If you don't have a bowel movement by post op day 3, then take Milk of Magnesia (over the counter).  If no bowel movement by at least post op day 5, then use a Dulcolax suppository (over the counter) and/or a Fleets enema--if still no bowel movement, call your MD.  Contact your doctor if you experience: fever greater than 101.5, chills, chest pain, difficulty breathing, bleeding, redness or heat around the incision.    Please follow up with your primary care provider.

## 2017-07-16 LAB
ANION GAP SERPL CALC-SCNC: 12 MMOL/L — SIGNIFICANT CHANGE UP (ref 5–17)
APPEARANCE UR: CLEAR — SIGNIFICANT CHANGE UP
BILIRUB UR-MCNC: NEGATIVE — SIGNIFICANT CHANGE UP
BUN SERPL-MCNC: 14 MG/DL — SIGNIFICANT CHANGE UP (ref 7–23)
CALCIUM SERPL-MCNC: 8.4 MG/DL — SIGNIFICANT CHANGE UP (ref 8.4–10.5)
CHLORIDE SERPL-SCNC: 96 MMOL/L — SIGNIFICANT CHANGE UP (ref 96–108)
CO2 SERPL-SCNC: 24 MMOL/L — SIGNIFICANT CHANGE UP (ref 22–31)
COLOR SPEC: YELLOW — SIGNIFICANT CHANGE UP
CREAT SERPL-MCNC: 0.9 MG/DL — SIGNIFICANT CHANGE UP (ref 0.5–1.3)
DIFF PNL FLD: NEGATIVE — SIGNIFICANT CHANGE UP
GLUCOSE SERPL-MCNC: 146 MG/DL — HIGH (ref 70–99)
GLUCOSE UR QL: NEGATIVE — SIGNIFICANT CHANGE UP
HBA1C BLD-MCNC: 7.3 % — HIGH (ref 4–5.6)
HCT VFR BLD CALC: 29.6 % — LOW (ref 34.5–45)
HGB BLD-MCNC: 10.2 G/DL — LOW (ref 11.5–15.5)
KETONES UR-MCNC: NEGATIVE — SIGNIFICANT CHANGE UP
LEUKOCYTE ESTERASE UR-ACNC: NEGATIVE — SIGNIFICANT CHANGE UP
MCHC RBC-ENTMCNC: 29.1 PG — SIGNIFICANT CHANGE UP (ref 27–34)
MCHC RBC-ENTMCNC: 34.5 G/DL — SIGNIFICANT CHANGE UP (ref 32–36)
MCV RBC AUTO: 84.6 FL — SIGNIFICANT CHANGE UP (ref 80–100)
MRSA PCR RESULT.: NEGATIVE — SIGNIFICANT CHANGE UP
NITRITE UR-MCNC: NEGATIVE — SIGNIFICANT CHANGE UP
PH UR: 5 — SIGNIFICANT CHANGE UP (ref 5–8)
PLATELET # BLD AUTO: 190 K/UL — SIGNIFICANT CHANGE UP (ref 150–400)
POTASSIUM SERPL-MCNC: 3.9 MMOL/L — SIGNIFICANT CHANGE UP (ref 3.5–5.3)
POTASSIUM SERPL-SCNC: 3.9 MMOL/L — SIGNIFICANT CHANGE UP (ref 3.5–5.3)
PROT UR-MCNC: NEGATIVE MG/DL — SIGNIFICANT CHANGE UP
RBC # BLD: 3.5 M/UL — LOW (ref 3.8–5.2)
RBC # FLD: 14.4 % — SIGNIFICANT CHANGE UP (ref 10.3–16.9)
S AUREUS DNA NOSE QL NAA+PROBE: NEGATIVE — SIGNIFICANT CHANGE UP
SODIUM SERPL-SCNC: 132 MMOL/L — LOW (ref 135–145)
SP GR SPEC: <=1.005 — SIGNIFICANT CHANGE UP (ref 1–1.03)
UROBILINOGEN FLD QL: 0.2 E.U./DL — SIGNIFICANT CHANGE UP
WBC # BLD: 11.1 K/UL — HIGH (ref 3.8–10.5)
WBC # FLD AUTO: 11.1 K/UL — HIGH (ref 3.8–10.5)

## 2017-07-16 RX ORDER — ZALEPLON 10 MG
5 CAPSULE ORAL AT BEDTIME
Qty: 0 | Refills: 0 | Status: DISCONTINUED | OUTPATIENT
Start: 2017-07-16 | End: 2017-07-18

## 2017-07-16 RX ADMIN — Medication 5 MILLIGRAM(S): at 21:42

## 2017-07-16 RX ADMIN — Medication 30 MILLIGRAM(S): at 06:00

## 2017-07-16 RX ADMIN — Medication 325 MILLIGRAM(S): at 18:17

## 2017-07-16 RX ADMIN — CELECOXIB 200 MILLIGRAM(S): 200 CAPSULE ORAL at 13:00

## 2017-07-16 RX ADMIN — Medication 325 MILLIGRAM(S): at 05:42

## 2017-07-16 RX ADMIN — Medication 30 MILLIGRAM(S): at 05:42

## 2017-07-16 RX ADMIN — CELECOXIB 200 MILLIGRAM(S): 200 CAPSULE ORAL at 18:18

## 2017-07-16 RX ADMIN — CELECOXIB 200 MILLIGRAM(S): 200 CAPSULE ORAL at 12:28

## 2017-07-16 RX ADMIN — Medication 30 MILLIGRAM(S): at 00:41

## 2017-07-16 RX ADMIN — Medication 2: at 12:28

## 2017-07-16 RX ADMIN — POLYETHYLENE GLYCOL 3350 17 GRAM(S): 17 POWDER, FOR SOLUTION ORAL at 12:28

## 2017-07-16 RX ADMIN — Medication 100 MILLIGRAM(S): at 12:31

## 2017-07-16 RX ADMIN — Medication 100 MILLIGRAM(S): at 21:42

## 2017-07-16 RX ADMIN — OXYCODONE HYDROCHLORIDE 10 MILLIGRAM(S): 5 TABLET ORAL at 18:18

## 2017-07-16 RX ADMIN — CELECOXIB 200 MILLIGRAM(S): 200 CAPSULE ORAL at 19:00

## 2017-07-16 RX ADMIN — Medication 100 MILLIGRAM(S): at 05:42

## 2017-07-16 RX ADMIN — Medication 100 MILLIGRAM(S): at 00:41

## 2017-07-16 RX ADMIN — Medication 2: at 21:42

## 2017-07-16 RX ADMIN — OXYCODONE HYDROCHLORIDE 10 MILLIGRAM(S): 5 TABLET ORAL at 19:00

## 2017-07-16 NOTE — PHYSICAL THERAPY INITIAL EVALUATION ADULT - ADDITIONAL COMMENTS
daughter lives with her temporarily, otherwise alone through the day, elevator building 10th floor, no DME use at home. previously independent and able to walk through [place] with grandchildren.

## 2017-07-16 NOTE — PHYSICAL THERAPY INITIAL EVALUATION ADULT - RANGE OF MOTION EXAMINATION, REHAB EVAL
Right LE ROM was WFL (within functional limits)/bilateral upper extremity ROM was WFL (within functional limits)/left hip <1/2 range

## 2017-07-16 NOTE — PHYSICAL THERAPY INITIAL EVALUATION ADULT - PERTINENT HX OF CURRENT PROBLEM, REHAB EVAL
81 yo female with femoral neck fracture following fall s/p left hip hemiarthroplasty seen for PT POD # 1 for PT evaluation

## 2017-07-16 NOTE — PHYSICAL THERAPY INITIAL EVALUATION ADULT - PLANNED THERAPY INTERVENTIONS, PT EVAL
strengthening/balance training/transfer training/bed mobility training/postural re-education/gait training

## 2017-07-17 LAB
ANION GAP SERPL CALC-SCNC: 13 MMOL/L — SIGNIFICANT CHANGE UP (ref 5–17)
BUN SERPL-MCNC: 17 MG/DL — SIGNIFICANT CHANGE UP (ref 7–23)
CALCIUM SERPL-MCNC: 9 MG/DL — SIGNIFICANT CHANGE UP (ref 8.4–10.5)
CHLORIDE SERPL-SCNC: 97 MMOL/L — SIGNIFICANT CHANGE UP (ref 96–108)
CO2 SERPL-SCNC: 26 MMOL/L — SIGNIFICANT CHANGE UP (ref 22–31)
CREAT SERPL-MCNC: 0.9 MG/DL — SIGNIFICANT CHANGE UP (ref 0.5–1.3)
GLUCOSE SERPL-MCNC: 137 MG/DL — HIGH (ref 70–99)
HCT VFR BLD CALC: 29.4 % — LOW (ref 34.5–45)
HGB BLD-MCNC: 10.2 G/DL — LOW (ref 11.5–15.5)
MCHC RBC-ENTMCNC: 29.4 PG — SIGNIFICANT CHANGE UP (ref 27–34)
MCHC RBC-ENTMCNC: 34.7 G/DL — SIGNIFICANT CHANGE UP (ref 32–36)
MCV RBC AUTO: 84.7 FL — SIGNIFICANT CHANGE UP (ref 80–100)
PLATELET # BLD AUTO: 219 K/UL — SIGNIFICANT CHANGE UP (ref 150–400)
POTASSIUM SERPL-MCNC: 4.7 MMOL/L — SIGNIFICANT CHANGE UP (ref 3.5–5.3)
POTASSIUM SERPL-SCNC: 4.7 MMOL/L — SIGNIFICANT CHANGE UP (ref 3.5–5.3)
RBC # BLD: 3.47 M/UL — LOW (ref 3.8–5.2)
RBC # FLD: 14.3 % — SIGNIFICANT CHANGE UP (ref 10.3–16.9)
SODIUM SERPL-SCNC: 136 MMOL/L — SIGNIFICANT CHANGE UP (ref 135–145)
WBC # BLD: 10.3 K/UL — SIGNIFICANT CHANGE UP (ref 3.8–10.5)
WBC # FLD AUTO: 10.3 K/UL — SIGNIFICANT CHANGE UP (ref 3.8–10.5)

## 2017-07-17 PROCEDURE — 99232 SBSQ HOSP IP/OBS MODERATE 35: CPT | Mod: GC

## 2017-07-17 RX ADMIN — OXYCODONE HYDROCHLORIDE 10 MILLIGRAM(S): 5 TABLET ORAL at 11:30

## 2017-07-17 RX ADMIN — POLYETHYLENE GLYCOL 3350 17 GRAM(S): 17 POWDER, FOR SOLUTION ORAL at 11:06

## 2017-07-17 RX ADMIN — Medication 325 MILLIGRAM(S): at 06:00

## 2017-07-17 RX ADMIN — Medication 100 MILLIGRAM(S): at 21:54

## 2017-07-17 RX ADMIN — MAGNESIUM HYDROXIDE 30 MILLILITER(S): 400 TABLET, CHEWABLE ORAL at 21:54

## 2017-07-17 RX ADMIN — OXYCODONE HYDROCHLORIDE 10 MILLIGRAM(S): 5 TABLET ORAL at 03:00

## 2017-07-17 RX ADMIN — CELECOXIB 200 MILLIGRAM(S): 200 CAPSULE ORAL at 17:27

## 2017-07-17 RX ADMIN — Medication 2: at 21:53

## 2017-07-17 RX ADMIN — Medication 2: at 11:12

## 2017-07-17 RX ADMIN — Medication 100 MILLIGRAM(S): at 06:00

## 2017-07-17 RX ADMIN — OXYCODONE HYDROCHLORIDE 10 MILLIGRAM(S): 5 TABLET ORAL at 02:00

## 2017-07-17 RX ADMIN — OXYCODONE HYDROCHLORIDE 10 MILLIGRAM(S): 5 TABLET ORAL at 11:05

## 2017-07-17 RX ADMIN — Medication 100 MILLIGRAM(S): at 14:28

## 2017-07-17 RX ADMIN — CELECOXIB 200 MILLIGRAM(S): 200 CAPSULE ORAL at 11:05

## 2017-07-17 RX ADMIN — CELECOXIB 200 MILLIGRAM(S): 200 CAPSULE ORAL at 11:30

## 2017-07-17 RX ADMIN — Medication 650 MILLIGRAM(S): at 11:05

## 2017-07-17 RX ADMIN — Medication 5 MILLIGRAM(S): at 23:54

## 2017-07-17 RX ADMIN — Medication 325 MILLIGRAM(S): at 17:26

## 2017-07-18 VITALS
DIASTOLIC BLOOD PRESSURE: 81 MMHG | SYSTOLIC BLOOD PRESSURE: 159 MMHG | HEART RATE: 87 BPM | RESPIRATION RATE: 15 BRPM | OXYGEN SATURATION: 99 % | TEMPERATURE: 98 F

## 2017-07-18 LAB
CULTURE RESULTS: NO GROWTH — SIGNIFICANT CHANGE UP
SPECIMEN SOURCE: SIGNIFICANT CHANGE UP

## 2017-07-18 PROCEDURE — C1889: CPT

## 2017-07-18 PROCEDURE — 76000 FLUOROSCOPY <1 HR PHYS/QHP: CPT

## 2017-07-18 PROCEDURE — 80053 COMPREHEN METABOLIC PANEL: CPT

## 2017-07-18 PROCEDURE — 96374 THER/PROPH/DIAG INJ IV PUSH: CPT

## 2017-07-18 PROCEDURE — 73502 X-RAY EXAM HIP UNI 2-3 VIEWS: CPT

## 2017-07-18 PROCEDURE — 86900 BLOOD TYPING SEROLOGIC ABO: CPT

## 2017-07-18 PROCEDURE — 87641 MR-STAPH DNA AMP PROBE: CPT

## 2017-07-18 PROCEDURE — 82553 CREATINE MB FRACTION: CPT

## 2017-07-18 PROCEDURE — 82550 ASSAY OF CK (CPK): CPT

## 2017-07-18 PROCEDURE — 84484 ASSAY OF TROPONIN QUANT: CPT

## 2017-07-18 PROCEDURE — 99285 EMERGENCY DEPT VISIT HI MDM: CPT | Mod: 25

## 2017-07-18 PROCEDURE — 87086 URINE CULTURE/COLONY COUNT: CPT

## 2017-07-18 PROCEDURE — 97161 PT EVAL LOW COMPLEX 20 MIN: CPT

## 2017-07-18 PROCEDURE — 81003 URINALYSIS AUTO W/O SCOPE: CPT

## 2017-07-18 PROCEDURE — 81001 URINALYSIS AUTO W/SCOPE: CPT

## 2017-07-18 PROCEDURE — 93005 ELECTROCARDIOGRAM TRACING: CPT

## 2017-07-18 PROCEDURE — 73552 X-RAY EXAM OF FEMUR 2/>: CPT

## 2017-07-18 PROCEDURE — C1713: CPT

## 2017-07-18 PROCEDURE — 72170 X-RAY EXAM OF PELVIS: CPT

## 2017-07-18 PROCEDURE — 71010: CPT

## 2017-07-18 PROCEDURE — 83036 HEMOGLOBIN GLYCOSYLATED A1C: CPT

## 2017-07-18 PROCEDURE — 85027 COMPLETE CBC AUTOMATED: CPT

## 2017-07-18 PROCEDURE — 97116 GAIT TRAINING THERAPY: CPT

## 2017-07-18 PROCEDURE — 86901 BLOOD TYPING SEROLOGIC RH(D): CPT

## 2017-07-18 PROCEDURE — 85730 THROMBOPLASTIN TIME PARTIAL: CPT

## 2017-07-18 PROCEDURE — 86850 RBC ANTIBODY SCREEN: CPT

## 2017-07-18 PROCEDURE — 85610 PROTHROMBIN TIME: CPT

## 2017-07-18 PROCEDURE — 96375 TX/PRO/DX INJ NEW DRUG ADDON: CPT

## 2017-07-18 PROCEDURE — 80048 BASIC METABOLIC PNL TOTAL CA: CPT

## 2017-07-18 PROCEDURE — 36415 COLL VENOUS BLD VENIPUNCTURE: CPT

## 2017-07-18 PROCEDURE — C1776: CPT

## 2017-07-18 RX ORDER — DOCUSATE SODIUM 100 MG
1 CAPSULE ORAL
Qty: 21 | Refills: 0
Start: 2017-07-18 | End: 2017-07-25

## 2017-07-18 RX ORDER — ASPIRIN/CALCIUM CARB/MAGNESIUM 324 MG
1 TABLET ORAL
Qty: 52 | Refills: 0 | OUTPATIENT
Start: 2017-07-18 | End: 2017-08-13

## 2017-07-18 RX ORDER — ASPIRIN/CALCIUM CARB/MAGNESIUM 324 MG
1 TABLET ORAL
Qty: 0 | Refills: 0 | COMMUNITY
Start: 2017-07-18

## 2017-07-18 RX ORDER — DOCUSATE SODIUM 100 MG
1 CAPSULE ORAL
Qty: 0 | Refills: 0 | DISCHARGE
Start: 2017-07-18

## 2017-07-18 RX ORDER — SENNA PLUS 8.6 MG/1
2 TABLET ORAL
Qty: 0 | Refills: 0 | COMMUNITY
Start: 2017-07-18

## 2017-07-18 RX ORDER — MELOXICAM 15 MG/1
1 TABLET ORAL
Qty: 30 | Refills: 0
Start: 2017-07-18 | End: 2017-08-17

## 2017-07-18 RX ORDER — OXYCODONE HYDROCHLORIDE 5 MG/1
1 TABLET ORAL
Qty: 60 | Refills: 0 | OUTPATIENT
Start: 2017-07-18

## 2017-07-18 RX ADMIN — POLYETHYLENE GLYCOL 3350 17 GRAM(S): 17 POWDER, FOR SOLUTION ORAL at 11:39

## 2017-07-18 RX ADMIN — OXYCODONE HYDROCHLORIDE 10 MILLIGRAM(S): 5 TABLET ORAL at 00:52

## 2017-07-18 RX ADMIN — Medication 10 MILLIGRAM(S): at 05:47

## 2017-07-18 RX ADMIN — CELECOXIB 200 MILLIGRAM(S): 200 CAPSULE ORAL at 11:39

## 2017-07-18 RX ADMIN — Medication 100 MILLIGRAM(S): at 11:39

## 2017-07-18 RX ADMIN — Medication 325 MILLIGRAM(S): at 07:12

## 2017-07-18 RX ADMIN — OXYCODONE HYDROCHLORIDE 10 MILLIGRAM(S): 5 TABLET ORAL at 01:50

## 2017-07-18 NOTE — PROGRESS NOTE ADULT - SUBJECTIVE AND OBJECTIVE BOX
SUBJECTIVE: Patient seen and examined. Pain controlled.  Pt did well o/n  No f/c/n/v/cp/sob.     OBJECTIVE:  NAD  Vital Signs Last 24 Hrs  T(C): 37.3 (16 Jul 2017 08:09), Max: 37.3 (16 Jul 2017 08:09)  T(F): 99.1 (16 Jul 2017 08:09), Max: 99.1 (16 Jul 2017 08:09)  HR: 92 (16 Jul 2017 08:09) (82 - 92)  BP: 103/48 (16 Jul 2017 08:09) (103/48 - 149/70)  BP(mean): --  RR: 16 (16 Jul 2017 08:09) (15 - 16)  SpO2: 98% (16 Jul 2017 08:09) (95% - 98%)    Affected extremity:          Dressing: clean/dry/intact            Sensation: SILT         Motor exam: 5/5 TA/GS/EHL         warm well perfused; capillary refill <3 seconds                         10.2   11.1  )-----------( 190      ( 16 Jul 2017 05:56 )             29.6     07-16    132<L>  |  96  |  14  ----------------------------<  146<H>  3.9   |  24  |  0.90    Ca    8.4      16 Jul 2017 05:55    TPro  7.8  /  Alb  4.1  /  TBili  0.7  /  DBili  x   /  AST  26  /  ALT  22  /  AlkPhos  108  07-14    A/P :  Pt is a 81yo Female s/p  L hip hei  -    Pain control  -    DVT ppx:ASA  -    Weight bearing status: WBAT   -    Physical Therapy  -    Dispo: Home
Interval Events: reviewed  Patient seen and examined at bedside.    Patient is a 82y old  Female who presents with a chief complaint of L hip pain, L FNF Garden Type 4 (15 Jul 2017 15:38)    she is better and was able to do PT  PAST MEDICAL & SURGICAL HISTORY:  Diabetes  No significant past surgical history      MEDICATIONS:  Pulmonary:    Antimicrobials:    Anticoagulants:  aspirin enteric coated 325 milliGRAM(s) Oral two times a day    Cardiac:      Allergies    No Known Allergies    Intolerances        Vital Signs Last 24 Hrs  T(C): 36.8 (17 Jul 2017 15:20), Max: 36.8 (17 Jul 2017 15:20)  T(F): 98.2 (17 Jul 2017 15:20), Max: 98.2 (17 Jul 2017 15:20)  HR: 80 (17 Jul 2017 15:20) (80 - 83)  BP: 160/73 (17 Jul 2017 15:20) (131/72 - 160/73)  BP(mean): --  RR: 19 (17 Jul 2017 15:20) (16 - 19)  SpO2: 99% (17 Jul 2017 15:20) (96% - 99%)    07-17 @ 07:01  -  07-17 @ 21:57  --------------------------------------------------------  IN: 0 mL / OUT: 600 mL / NET: -600 mL          LABS:      CBC Full  -  ( 17 Jul 2017 08:48 )  WBC Count : 10.3 K/uL  Hemoglobin : 10.2 g/dL  Hematocrit : 29.4 %  Platelet Count - Automated : 219 K/uL  Mean Cell Volume : 84.7 fL  Mean Cell Hemoglobin : 29.4 pg  Mean Cell Hemoglobin Concentration : 34.7 g/dL  Auto Neutrophil # : x  Auto Lymphocyte # : x  Auto Monocyte # : x  Auto Eosinophil # : x  Auto Basophil # : x  Auto Neutrophil % : x  Auto Lymphocyte % : x  Auto Monocyte % : x  Auto Eosinophil % : x  Auto Basophil % : x    07-17    136  |  97  |  17  ----------------------------<  137<H>  4.7   |  26  |  0.90    Ca    9.0      17 Jul 2017 08:48            Urinalysis Basic - ( 16 Jul 2017 19:12 )    Color: Yellow / Appearance: Clear / SG: <=1.005 / pH: x  Gluc: x / Ketone: NEGATIVE  / Bili: NEGATIVE / Urobili: 0.2 E.U./dL   Blood: x / Protein: NEGATIVE mg/dL / Nitrite: NEGATIVE   Leuk Esterase: NEGATIVE / RBC: x / WBC x   Sq Epi: x / Non Sq Epi: x / Bacteria: x                  RADIOLOGY & ADDITIONAL STUDIES (The following images were personally reviewed):  Horne:                                   No  Urine output:               Yes         DVT prophylaxis:         Yes          Flattus:                          Yes         Bowel movement:       Yes
ORTHO NOTE    [x ] Pt seen/examined at approx 11:30 AM.  [x ] Pt without any complaints/in NAD. Denied UTI symptoms.    [ ] Pt complains of:      ROS: [ ] Fever  [ ] Chills  [ ] CP [ ] SOB [ ] Dysnea  [ ] Palpitations [ ] Cough [ ] N/V/C/D [ ] Paresthia [ ] Other     [x ] ROS  otherwise negative    .    PHYSICAL EXAM:    Vital Signs Last 24 Hrs  T(C): 36.7 (17 Jul 2017 08:29), Max: 36.7 (16 Jul 2017 15:16)  T(F): 98.1 (17 Jul 2017 08:29), Max: 98.1 (17 Jul 2017 08:29)  HR: 83 (17 Jul 2017 08:29) (75 - 84)  BP: 147/67 (17 Jul 2017 08:29) (111/53 - 147/67)  BP(mean): --  RR: 16 (17 Jul 2017 08:29) (16 - 16)  SpO2: 96% (17 Jul 2017 08:29) (95% - 97%)    I&O's Detail       CAPILLARY BLOOD GLUCOSE  189 (17 Jul 2017 11:10)  129 (17 Jul 2017 06:38)  173 (16 Jul 2017 20:52)  147 (16 Jul 2017 16:16)                      Neuro: NAD, A + O x3    Lungs:    CV:    ABD: Soft NT/ND    Ext: B/L 5/5 FHL/GS/TA/EHL, B/L SILT    LABS   17 Jul 2017 08:48    136    |  97     |  17     ----------------------------<  137    4.7     |  26     |  0.90     Ca    9.0        17 Jul 2017 08:48                                   10.2   10.3  )-----------( 219      ( 17 Jul 2017 08:48 )             29.4                 [ ] Other Labs  [ ] None ordered            Please check or [place] when present:  •  Heart Failure:    [ ] Acute        [ ]  Acute on Chronic        [ ] Chronic         [ ] Diastolic     [ ]  Combined    •  IZAIAH:     [ ] ATN        [ ]  Renal medullary necrosis       [ ]  Renal cortical necrosis                  [ ] Other pathological Lesion:  •  CKD:  [ ] Stage I   [ ] Stage II  [ ] Stage III    [ ]Stage IV   [ ]  CKD V   [ ]  Other/Unspecified:    •  Abdominal Nutritional Status:   [ ] Malnutrition-See Nutrition note    [ ] Cachexia   [ ]  Other        [ ] Supplement ordered:            [ ] Morbid Obesity: BMI >=40         ASSESSMENT/PLAN:      STATUS POST: Left hemiarthroplasty POD #2    CONTINUE:          [x ] PT: WBAT    [x ] DVT PPX- ASA 325mg BID    [x ] Pain Mgt: Continue current regimen.    [x ] Dispo plan-Home w/ homecare.    Pending PT clearance.    UA negative, F/U UCx. UO good.
Ortho Preop Note    Patient is a 82y old  Female who presents with a chief complaint of L hip pain, L FNF Garden Type 4 (2017 22:09)    Diagnosis: LFNF  Procedure: L NAHED vs L myron  Surgeon: Stewart                          [id number]   21.5  )-----------( 271      ( 2017 16:43 )             40.0     07-14    139  |  102  |  13  ----------------------------<  247<H>  3.6   |  28  |  0.87    Ca    9.5      2017 16:43    TPro  7.8  /  Alb  4.1  /  TBili  0.7  /  DBili  x   /  AST  26  /  ALT  22  /  AlkPhos  108  07-14    PT/INR - ( 2017 16:43 )   PT: 11.0 sec;   INR: 1.00          PTT - ( 2017 16:43 )  PTT:26.4 sec  Urinalysis Basic - ( 2017 18:17 )    Color: Yellow / Appearance: Clear / S.010 / pH: x  Gluc: x / Ketone: Trace mg/dL  / Bili: NEGATIVE / Urobili: 0.2 E.U./dL   Blood: x / Protein: Trace mg/dL / Nitrite: NEGATIVE   Leuk Esterase: NEGATIVE / RBC: < 5 /HPF / WBC < 5 /HPF   Sq Epi: x / Non Sq Epi: Rare /HPF / Bacteria: Few /HPF        [x] Type & Screen  [x] CBC  [x ] BMP  [x ] PT/PTT/INR  [x ] Urinalysis  [x ] Chest X-ray  [x ] EKG  [x ] NPO/IVF  [x ] Consent  [ ] Clearance  [x ] Added on to OR Schedule  [x ] Anti-coagulation held  [x ] MRSA/MSSA Nasal Screen     Assessment & Plan:  82yFemale with L FNF  -For OR 7/15/17    Ortho Pager [phone]
Orthopaedics Post Op Check    Procedure: L hip myron arthroplasty  Surgeon: Yulissa    Pt comfortable, without complaints  Denies CP, SOB, N/V, numbness/tingling     Vital Signs Last 24 Hrs  T(C): 36.5 (15 Jul 2017 14:17), Max: 37.3 (15 Jul 2017 05:10)  T(F): 97.7 (15 Jul 2017 14:17), Max: 99.2 (15 Jul 2017 05:10)  HR: 81 (15 Jul 2017 14:17) (81 - 95)  BP: 106/63 (15 Jul 2017 14:17) (106/63 - 187/82)  BP(mean): --  RR: 16 (15 Jul 2017 14:17) (16 - 18)  SpO2: 95% (15 Jul 2017 14:17) (95% - 100%)  AVSS, NAD    Dressing C/D/I  General: Pt Alert and oriented     Pulses: +2 DP/PT bilaterally  Sensation: SILT in bilateral LEs  Motor: 5/5 FHL, EHL, TA,GS bilaterally                          11.2   19.1  )-----------( 220      ( 15 Jul 2017 13:52 )             32.4   07-15    132<L>  |  96  |  11  ----------------------------<  168<H>  3.9   |  24  |  0.70    Ca    8.8      15 Jul 2017 07:59    TPro  7.8  /  Alb  4.1  /  TBili  0.7  /  DBili  x   /  AST  26  /  ALT  22  /  AlkPhos  108  07-14    Post op XR: prosthesis in place    A/P: 82yFemale POD#0 s/p L hip myron  - Stable  - Pain Control  - DVT ppx: asa  - Post op abx: ancef  - PT, WBS: wbat  - F/U AM Labs
SUBJECTIVE: Patient seen and examined. Pain controlled.  Pt did well o/n  No f/c/n/v/cp/sob.     OBJECTIVE:  NAD  Vital Signs Last 24 Hrs  T(C): 35.4 (18 Jul 2017 04:48), Max: 36.8 (17 Jul 2017 15:20)  T(F): 95.8 (18 Jul 2017 04:48), Max: 98.2 (17 Jul 2017 15:20)  HR: 86 (18 Jul 2017 04:48) (80 - 86)  BP: 145/65 (18 Jul 2017 04:48) (145/65 - 160/73)  BP(mean): --  RR: 17 (18 Jul 2017 04:48) (16 - 19)  SpO2: 96% (18 Jul 2017 04:48) (96% - 100%)    Affected extremity:          Dressing: clean/dry/intact            Sensation: SILT         Motor exam: 5/5 TA/GS/EHL         warm well perfused; capillary refill <3 seconds                                    10.2   10.3  )-----------( 219      ( 17 Jul 2017 08:48 )             29.4     07-17    136  |  97  |  17  ----------------------------<  137<H>  4.7   |  26  |  0.90    Ca    9.0      17 Jul 2017 08:48      A/P :  Pt is a 81yo Female s/p  L hip myron  -    Pain control  -    DVT ppx:ASA  -    Weight bearing status: WBAT   -    Physical Therapy  -    Dispo: Home
SUBJECTIVE: Patient seen and examined. Pain controlled.  Pt did well o/n  No f/c/n/v/cp/sob.     OBJECTIVE:  NAD  Vital Signs Last 24 Hrs  T(C): 35.8 (17 Jul 2017 21:08), Max: 36.8 (17 Jul 2017 15:20)  T(F): 96.4 (17 Jul 2017 21:08), Max: 98.2 (17 Jul 2017 15:20)  HR: 84 (17 Jul 2017 21:08) (80 - 84)  BP: 151/68 (17 Jul 2017 21:08) (131/72 - 160/73)  BP(mean): --  RR: 16 (17 Jul 2017 21:08) (16 - 19)  SpO2: 100% (17 Jul 2017 21:08) (96% - 100%)    Affected extremity:          Dressing: clean/dry/intact            Sensation: SILT         Motor exam: 5/5 TA/GS/EHL         warm well perfused; capillary refill <3 seconds                                      10.2   10.3  )-----------( 219      ( 17 Jul 2017 08:48 )             29.4     07-17    136  |  97  |  17  ----------------------------<  137<H>  4.7   |  26  |  0.90    Ca    9.0      17 Jul 2017 08:48        A/P :  Pt is a 81yo Female s/p  L hip myron  -    Pain control  -    DVT ppx:ASA  -    Weight bearing status: WBAT   -    Physical Therapy  -    Dispo: Home

## 2017-07-23 DIAGNOSIS — S72.002A FRACTURE OF UNSPECIFIED PART OF NECK OF LEFT FEMUR, INITIAL ENCOUNTER FOR CLOSED FRACTURE: ICD-10-CM
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
## 2017-07-23 DIAGNOSIS — E11.9 TYPE 2 DIABETES MELLITUS WITHOUT COMPLICATIONS: ICD-10-CM

## 2017-07-23 DIAGNOSIS — D72.829 ELEVATED WHITE BLOOD CELL COUNT, UNSPECIFIED: ICD-10-CM

## 2017-09-06 ENCOUNTER — APPOINTMENT (OUTPATIENT)
Dept: INTERNAL MEDICINE | Facility: CLINIC | Age: 82
End: 2017-09-06
Payer: MEDICARE

## 2017-09-06 VITALS
BODY MASS INDEX: 25.32 KG/M2 | DIASTOLIC BLOOD PRESSURE: 80 MMHG | HEIGHT: 60 IN | WEIGHT: 129 LBS | HEART RATE: 82 BPM | OXYGEN SATURATION: 97 % | SYSTOLIC BLOOD PRESSURE: 150 MMHG | TEMPERATURE: 98.3 F

## 2017-09-06 DIAGNOSIS — Z78.9 OTHER SPECIFIED HEALTH STATUS: ICD-10-CM

## 2017-09-06 DIAGNOSIS — Z80.0 FAMILY HISTORY OF MALIGNANT NEOPLASM OF DIGESTIVE ORGANS: ICD-10-CM

## 2017-09-06 DIAGNOSIS — Z84.89 FAMILY HISTORY OF OTHER SPECIFIED CONDITIONS: ICD-10-CM

## 2017-09-06 DIAGNOSIS — F15.90 OTHER STIMULANT USE, UNSPECIFIED, UNCOMPLICATED: ICD-10-CM

## 2017-09-06 PROCEDURE — 99204 OFFICE O/P NEW MOD 45 MIN: CPT | Mod: 25

## 2017-09-06 PROCEDURE — 36415 COLL VENOUS BLD VENIPUNCTURE: CPT

## 2017-09-07 ENCOUNTER — MOBILE ON CALL (OUTPATIENT)
Age: 82
End: 2017-09-07

## 2017-09-08 ENCOUNTER — MESSAGE (OUTPATIENT)
Age: 82
End: 2017-09-08

## 2017-09-08 LAB
25(OH)D3 SERPL-MCNC: 28.3 NG/ML
ALBUMIN SERPL ELPH-MCNC: 4.2 G/DL
ALP BLD-CCNC: 114 U/L
ALT SERPL-CCNC: 14 U/L
ANION GAP SERPL CALC-SCNC: 17 MMOL/L
APPEARANCE: ABNORMAL
AST SERPL-CCNC: 19 U/L
BACTERIA: ABNORMAL
BASOPHILS # BLD AUTO: 0.03 K/UL
BASOPHILS NFR BLD AUTO: 0.4 %
BILIRUB SERPL-MCNC: 0.3 MG/DL
BILIRUBIN URINE: NEGATIVE
BLOOD URINE: NEGATIVE
BUN SERPL-MCNC: 16 MG/DL
CALCIUM SERPL-MCNC: 9.7 MG/DL
CHLORIDE SERPL-SCNC: 99 MMOL/L
CHOLEST SERPL-MCNC: 180 MG/DL
CHOLEST/HDLC SERPL: 2.4 RATIO
CO2 SERPL-SCNC: 24 MMOL/L
COLOR: ABNORMAL
CREAT SERPL-MCNC: 0.8 MG/DL
CREAT SPEC-SCNC: 109 MG/DL
EOSINOPHIL # BLD AUTO: 0.15 K/UL
EOSINOPHIL NFR BLD AUTO: 2 %
GLUCOSE QUALITATIVE U: 500 MG/DL
GLUCOSE SERPL-MCNC: 219 MG/DL
HBA1C MFR BLD HPLC: 7.1 %
HCT VFR BLD CALC: 36.5 %
HCV AB SER QL: NONREACTIVE
HCV S/CO RATIO: 0.24 S/CO
HDLC SERPL-MCNC: 74 MG/DL
HGB BLD-MCNC: 11.5 G/DL
HYALINE CASTS: 0 /LPF
IMM GRANULOCYTES NFR BLD AUTO: 0.1 %
KETONES URINE: NEGATIVE
LDLC SERPL CALC-MCNC: 84 MG/DL
LEUKOCYTE ESTERASE URINE: ABNORMAL
LYMPHOCYTES # BLD AUTO: 1.81 K/UL
LYMPHOCYTES NFR BLD AUTO: 23.6 %
MAN DIFF?: NORMAL
MCHC RBC-ENTMCNC: 28 PG
MCHC RBC-ENTMCNC: 31.5 GM/DL
MCV RBC AUTO: 88.8 FL
MICROALBUMIN 24H UR DL<=1MG/L-MCNC: 1.8 MG/DL
MICROALBUMIN/CREAT 24H UR-RTO: 17 MG/G
MICROSCOPIC-UA: NORMAL
MONOCYTES # BLD AUTO: 0.4 K/UL
MONOCYTES NFR BLD AUTO: 5.2 %
NEUTROPHILS # BLD AUTO: 5.27 K/UL
NEUTROPHILS NFR BLD AUTO: 68.7 %
NITRITE URINE: POSITIVE
PH URINE: 5
PLATELET # BLD AUTO: 345 K/UL
POTASSIUM SERPL-SCNC: 4.6 MMOL/L
PROT SERPL-MCNC: 6.9 G/DL
PROTEIN URINE: NEGATIVE MG/DL
RBC # BLD: 4.11 M/UL
RBC # FLD: 15.5 %
RED BLOOD CELLS URINE: 4 /HPF
SODIUM SERPL-SCNC: 140 MMOL/L
SPECIFIC GRAVITY URINE: 1.02
SQUAMOUS EPITHELIAL CELLS: 8 /HPF
TRIGL SERPL-MCNC: 112 MG/DL
TSH SERPL-ACNC: 1.66 UIU/ML
URINE COMMENTS: NORMAL
UROBILINOGEN URINE: NORMAL MG/DL
WBC # FLD AUTO: 7.67 K/UL
WHITE BLOOD CELLS URINE: 53 /HPF

## 2017-09-11 ENCOUNTER — APPOINTMENT (OUTPATIENT)
Dept: RADIOLOGY | Facility: CLINIC | Age: 82
End: 2017-09-11
Payer: MEDICARE

## 2017-09-11 ENCOUNTER — APPOINTMENT (OUTPATIENT)
Dept: ULTRASOUND IMAGING | Facility: CLINIC | Age: 82
End: 2017-09-11
Payer: MEDICARE

## 2017-09-11 ENCOUNTER — OUTPATIENT (OUTPATIENT)
Dept: OUTPATIENT SERVICES | Facility: HOSPITAL | Age: 82
LOS: 1 days | End: 2017-09-11

## 2017-09-11 PROCEDURE — 93971 EXTREMITY STUDY: CPT | Mod: 26,LT

## 2017-09-11 PROCEDURE — 77085 DXA BONE DENSITY AXL VRT FX: CPT | Mod: 26

## 2017-09-13 ENCOUNTER — MESSAGE (OUTPATIENT)
Age: 82
End: 2017-09-13

## 2017-09-26 ENCOUNTER — MESSAGE (OUTPATIENT)
Age: 82
End: 2017-09-26

## 2017-10-10 ENCOUNTER — APPOINTMENT (OUTPATIENT)
Dept: INTERNAL MEDICINE | Facility: CLINIC | Age: 82
End: 2017-10-10
Payer: MEDICARE

## 2017-10-10 VITALS
HEART RATE: 81 BPM | BODY MASS INDEX: 24.74 KG/M2 | SYSTOLIC BLOOD PRESSURE: 140 MMHG | TEMPERATURE: 98.1 F | DIASTOLIC BLOOD PRESSURE: 76 MMHG | HEIGHT: 60 IN | WEIGHT: 126 LBS | OXYGEN SATURATION: 98 %

## 2017-10-10 DIAGNOSIS — M65.311 TRIGGER THUMB, RIGHT THUMB: ICD-10-CM

## 2017-10-10 DIAGNOSIS — L29.9 PRURITUS, UNSPECIFIED: ICD-10-CM

## 2017-10-10 PROCEDURE — 99213 OFFICE O/P EST LOW 20 MIN: CPT

## 2017-10-21 ENCOUNTER — INPATIENT (INPATIENT)
Facility: HOSPITAL | Age: 82
LOS: 3 days | Discharge: HOME HEALTH | DRG: 409 | End: 2017-10-25
Attending: SURGERY | Admitting: SURGERY
Payer: MEDICARE

## 2017-10-21 VITALS
RESPIRATION RATE: 18 BRPM | SYSTOLIC BLOOD PRESSURE: 185 MMHG | HEART RATE: 93 BPM | DIASTOLIC BLOOD PRESSURE: 82 MMHG | TEMPERATURE: 99 F | OXYGEN SATURATION: 100 %

## 2017-10-21 LAB
ALBUMIN SERPL ELPH-MCNC: 4.1 G/DL — SIGNIFICANT CHANGE UP (ref 3.4–5)
ALP SERPL-CCNC: 121 U/L — HIGH (ref 40–120)
ALT FLD-CCNC: 15 U/L — SIGNIFICANT CHANGE UP (ref 12–42)
ANION GAP SERPL CALC-SCNC: 10 MMOL/L — SIGNIFICANT CHANGE UP (ref 9–16)
APPEARANCE UR: CLEAR — SIGNIFICANT CHANGE UP
APTT BLD: 29.3 SEC — SIGNIFICANT CHANGE UP (ref 27.5–36.5)
AST SERPL-CCNC: 20 U/L — SIGNIFICANT CHANGE UP (ref 15–37)
BILIRUB SERPL-MCNC: 0.5 MG/DL — SIGNIFICANT CHANGE UP (ref 0.2–1.2)
BILIRUB UR-MCNC: NEGATIVE — SIGNIFICANT CHANGE UP
BUN SERPL-MCNC: 12 MG/DL — SIGNIFICANT CHANGE UP (ref 7–23)
CALCIUM SERPL-MCNC: 9.9 MG/DL — SIGNIFICANT CHANGE UP (ref 8.5–10.5)
CHLORIDE SERPL-SCNC: 99 MMOL/L — SIGNIFICANT CHANGE UP (ref 96–108)
CO2 SERPL-SCNC: 26 MMOL/L — SIGNIFICANT CHANGE UP (ref 22–31)
COLOR SPEC: YELLOW — SIGNIFICANT CHANGE UP
CREAT SERPL-MCNC: 0.76 MG/DL — SIGNIFICANT CHANGE UP (ref 0.5–1.3)
DIFF PNL FLD: (no result)
GLUCOSE BLDC GLUCOMTR-MCNC: 237 MG/DL — HIGH (ref 70–99)
GLUCOSE SERPL-MCNC: 242 MG/DL — HIGH (ref 70–99)
GLUCOSE UR QL: >=1000
HCT VFR BLD CALC: 43.6 % — SIGNIFICANT CHANGE UP (ref 34.5–45)
HGB BLD-MCNC: 14.1 G/DL — SIGNIFICANT CHANGE UP (ref 11.5–15.5)
INR BLD: 1.02 — SIGNIFICANT CHANGE UP (ref 0.88–1.16)
KETONES UR-MCNC: 15 MG/DL
LACTATE SERPL-SCNC: 1.8 MMOL/L — SIGNIFICANT CHANGE UP (ref 0.4–2)
LEUKOCYTE ESTERASE UR-ACNC: NEGATIVE — SIGNIFICANT CHANGE UP
LIDOCAIN IGE QN: 123 U/L — SIGNIFICANT CHANGE UP (ref 73–393)
LYMPHOCYTES # BLD AUTO: 6 % — LOW (ref 13–44)
MCHC RBC-ENTMCNC: 27.3 PG — SIGNIFICANT CHANGE UP (ref 27–34)
MCHC RBC-ENTMCNC: 32.3 G/DL — SIGNIFICANT CHANGE UP (ref 32–36)
MCV RBC AUTO: 84.5 FL — SIGNIFICANT CHANGE UP (ref 80–100)
MONOCYTES NFR BLD AUTO: 5 % — SIGNIFICANT CHANGE UP (ref 2–14)
NEUTROPHILS NFR BLD AUTO: 86 % — HIGH (ref 43–77)
NITRITE UR-MCNC: NEGATIVE — SIGNIFICANT CHANGE UP
PH UR: 8 — SIGNIFICANT CHANGE UP (ref 5–8)
PLATELET # BLD AUTO: 302 K/UL — SIGNIFICANT CHANGE UP (ref 150–400)
POTASSIUM SERPL-MCNC: 4.3 MMOL/L — SIGNIFICANT CHANGE UP (ref 3.5–5.3)
POTASSIUM SERPL-SCNC: 4.3 MMOL/L — SIGNIFICANT CHANGE UP (ref 3.5–5.3)
PROT SERPL-MCNC: 8.4 G/DL — HIGH (ref 6.4–8.2)
PROT UR-MCNC: 100 MG/DL
PROTHROM AB SERPL-ACNC: 11.2 SEC — SIGNIFICANT CHANGE UP (ref 9.8–12.7)
RBC # BLD: 5.16 M/UL — SIGNIFICANT CHANGE UP (ref 3.8–5.2)
RBC # FLD: 14 % — SIGNIFICANT CHANGE UP (ref 10.3–16.9)
SODIUM SERPL-SCNC: 135 MMOL/L — SIGNIFICANT CHANGE UP (ref 132–145)
SP GR SPEC: 1.02 — SIGNIFICANT CHANGE UP (ref 1–1.03)
UROBILINOGEN FLD QL: 0.2 E.U./DL — SIGNIFICANT CHANGE UP
WBC # BLD: 15.5 K/UL — HIGH (ref 3.8–10.5)
WBC # FLD AUTO: 15.5 K/UL — HIGH (ref 3.8–10.5)

## 2017-10-21 PROCEDURE — 74010: CPT | Mod: 26

## 2017-10-21 PROCEDURE — 71020: CPT | Mod: 26

## 2017-10-21 PROCEDURE — 71010: CPT | Mod: 26,59

## 2017-10-21 PROCEDURE — 99285 EMERGENCY DEPT VISIT HI MDM: CPT | Mod: 25

## 2017-10-21 PROCEDURE — 74177 CT ABD & PELVIS W/CONTRAST: CPT | Mod: 26

## 2017-10-21 PROCEDURE — 93010 ELECTROCARDIOGRAM REPORT: CPT

## 2017-10-21 RX ORDER — HEPARIN SODIUM 5000 [USP'U]/ML
5000 INJECTION INTRAVENOUS; SUBCUTANEOUS EVERY 8 HOURS
Qty: 0 | Refills: 0 | Status: DISCONTINUED | OUTPATIENT
Start: 2017-10-21 | End: 2017-10-25

## 2017-10-21 RX ORDER — ONDANSETRON 8 MG/1
4 TABLET, FILM COATED ORAL ONCE
Qty: 0 | Refills: 0 | Status: COMPLETED | OUTPATIENT
Start: 2017-10-21 | End: 2017-10-21

## 2017-10-21 RX ORDER — SODIUM CHLORIDE 9 MG/ML
1000 INJECTION INTRAMUSCULAR; INTRAVENOUS; SUBCUTANEOUS
Qty: 0 | Refills: 0 | Status: DISCONTINUED | OUTPATIENT
Start: 2017-10-21 | End: 2017-10-23

## 2017-10-21 RX ORDER — ONDANSETRON 8 MG/1
4 TABLET, FILM COATED ORAL EVERY 4 HOURS
Qty: 0 | Refills: 0 | Status: DISCONTINUED | OUTPATIENT
Start: 2017-10-21 | End: 2017-10-25

## 2017-10-21 RX ORDER — CEFOTETAN DISODIUM 1 G
VIAL (EA) INJECTION
Qty: 0 | Refills: 0 | Status: DISCONTINUED | OUTPATIENT
Start: 2017-10-22 | End: 2017-10-22

## 2017-10-21 RX ORDER — SODIUM CHLORIDE 9 MG/ML
1000 INJECTION INTRAMUSCULAR; INTRAVENOUS; SUBCUTANEOUS ONCE
Qty: 0 | Refills: 0 | Status: COMPLETED | OUTPATIENT
Start: 2017-10-21 | End: 2017-10-21

## 2017-10-21 RX ORDER — MORPHINE SULFATE 50 MG/1
2 CAPSULE, EXTENDED RELEASE ORAL EVERY 4 HOURS
Qty: 0 | Refills: 0 | Status: DISCONTINUED | OUTPATIENT
Start: 2017-10-21 | End: 2017-10-23

## 2017-10-21 RX ORDER — INSULIN LISPRO 100/ML
VIAL (ML) SUBCUTANEOUS EVERY 6 HOURS
Qty: 0 | Refills: 0 | Status: DISCONTINUED | OUTPATIENT
Start: 2017-10-21 | End: 2017-10-25

## 2017-10-21 RX ORDER — METOPROLOL TARTRATE 50 MG
5 TABLET ORAL ONCE
Qty: 0 | Refills: 0 | Status: COMPLETED | OUTPATIENT
Start: 2017-10-21 | End: 2017-10-21

## 2017-10-21 RX ORDER — CEFOTETAN DISODIUM 1 G
2 VIAL (EA) INJECTION ONCE
Qty: 0 | Refills: 0 | Status: COMPLETED | OUTPATIENT
Start: 2017-10-21 | End: 2017-10-21

## 2017-10-21 RX ORDER — MORPHINE SULFATE 50 MG/1
4 CAPSULE, EXTENDED RELEASE ORAL ONCE
Qty: 0 | Refills: 0 | Status: DISCONTINUED | OUTPATIENT
Start: 2017-10-21 | End: 2017-10-21

## 2017-10-21 RX ORDER — MORPHINE SULFATE 50 MG/1
2 CAPSULE, EXTENDED RELEASE ORAL ONCE
Qty: 0 | Refills: 0 | Status: DISCONTINUED | OUTPATIENT
Start: 2017-10-21 | End: 2017-10-21

## 2017-10-21 RX ADMIN — Medication 5 MILLIGRAM(S): at 23:39

## 2017-10-21 RX ADMIN — SODIUM CHLORIDE 500 MILLILITER(S): 9 INJECTION INTRAMUSCULAR; INTRAVENOUS; SUBCUTANEOUS at 16:46

## 2017-10-21 RX ADMIN — SODIUM CHLORIDE 500 MILLILITER(S): 9 INJECTION INTRAMUSCULAR; INTRAVENOUS; SUBCUTANEOUS at 21:54

## 2017-10-21 RX ADMIN — ONDANSETRON 4 MILLIGRAM(S): 8 TABLET, FILM COATED ORAL at 20:12

## 2017-10-21 RX ADMIN — ONDANSETRON 4 MILLIGRAM(S): 8 TABLET, FILM COATED ORAL at 17:26

## 2017-10-21 RX ADMIN — ONDANSETRON 4 MILLIGRAM(S): 8 TABLET, FILM COATED ORAL at 16:46

## 2017-10-21 RX ADMIN — MORPHINE SULFATE 2 MILLIGRAM(S): 50 CAPSULE, EXTENDED RELEASE ORAL at 17:26

## 2017-10-21 RX ADMIN — Medication 140 GRAM(S): at 20:13

## 2017-10-21 RX ADMIN — MORPHINE SULFATE 4 MILLIGRAM(S): 50 CAPSULE, EXTENDED RELEASE ORAL at 20:13

## 2017-10-21 NOTE — ED PROVIDER NOTE - OBJECTIVE STATEMENT
81 y/o F with a PMHx of back pain and hip replacement (July 2017) presents to the ED c/o abdominal pain x today. Pt reports she had dinner of fish sandwich and latte at [employer] and woke up with pain.     fishsandwich and latte from [employer]. woke up with abdominal pain and back pain. sudden onset of pain this morning. nauseous and expelling phlegm, no vomit  Hip replacement (July 2017) epigastric 81 y/o F with a PMHx of back pain and hip replacement (July 2017) presents to the ED c/o abdominal pain x today. Pt reports she had dinner of fish sandwich and latte at [employer] and woke up with a sudden onset of pain in the epigastric region. Pt states she nausea and expelled phlegm. But denies vomiting. Pt denies constipation, diarrhea, chest pain, sob, headache, fever, chills, or urinary symptoms. 81 y/o F with a PMHx of back pain and hip replacement (July 2017) presents to the ED c/o abdominal pain x today. Pt reports she had dinner of fish sandwich and latte at [employer] and woke up with a sudden onset of pain in the epigastric region. Pt states she was nauseous and expelled phlegm. But denies vomiting. Pt denies constipation, diarrhea, chest pain, sob, headache, fever, chills, or urinary symptoms. 81 y/o F with a PMHx of back pain and hip replacement (July 2017) presents to the ED c/o abdominal pain x last night.  Pt reports she had dinner of fish sandwich and latte at [employer] and developed epigastric abd pain. Pt states she was nauseous and expelled phlegm. But denies vomiting. Pt denies constipation, diarrhea, chest pain, sob, headache, fever, chills, or urinary symptoms.

## 2017-10-21 NOTE — H&P ADULT - HISTORY OF PRESENT ILLNESS
82 year old healthy female with a history of diabetes with no significant surgical history other then a recent hip fracture, presents as a transfer from [facility] with acute cholecystitis. The patient reports pain / nausea / anorexia x 24 hours. The patient denies fevers or any other symptoms The patient has a history of cholelithiasis but not previous bouts of biliary colic or acute cholecystitis. CT scan demonstrated acute cholecystitis. The patient was transferred to [facility] for definitive surgical management.

## 2017-10-21 NOTE — ED ADULT TRIAGE NOTE - CHIEF COMPLAINT QUOTE
Patient to ED with complaint of abdominal pain, nausea, vomiting, dry mouth, and unable to tolerate PO.

## 2017-10-21 NOTE — ED PROVIDER NOTE - DIAGNOSTIC INTERPRETATION
ER Physician: Deon Gray  ABDOMINAL XRAY INTERPRETATION:  nonspecific gas pattern, no free air noted, no apparent hepatomegaly

## 2017-10-21 NOTE — ED ADULT NURSE NOTE - OBJECTIVE STATEMENT
Pt arrived with severe epigastric pain with nausea, pt denies any other symptoms upon arrival pt has had frequent episodes of urination.  She has also complained of dry heaves

## 2017-10-21 NOTE — ED PROVIDER NOTE - MEDICAL DECISION MAKING DETAILS
abd pain, nv after eating, tender epigastrium, pt not cooperative w/ exam, will start w/ xray chest upright, followed by CT to evaluate for surgical pathology, analgesia/antiemetic prn

## 2017-10-21 NOTE — H&P ADULT - ASSESSMENT
82 year old female with DM presents with acute cholecystitis   pain control  DVT ppx  Pre op screening   OR planning   NPO  Abx 82 year old female with DM presents with acute cholecystitis   pain control  DVT ppx  Pre op planning   	- EKG / CXR / type and screen and coags for the morning / patient needs to be added on in the AM / patient instructed to remain NPO    OR planning   NPO  Abx   ISS for DM   OVF

## 2017-10-21 NOTE — ED PROVIDER NOTE - PROGRESS NOTE DETAILS
noted CT findings, wbc elevated, clinically c/w acute cholecystitis, bedside US w/ immobile stone at neck but no obvious edema/pericholecystic fluid, abx given, txf center called to d/w oncall surgeon.

## 2017-10-21 NOTE — H&P ADULT - NSHPPHYSICALEXAM_GEN_ALL_CORE
Gen: NAD   HEENT: Normocephalic, atraumatic   Resp: CTA B/L   CV: RRR, no murmurs or gallops, normal s1 / s2   Abd: Soft, epigastric tenderness on palpation, ND, negative villagomez's sign   Neuro: No focal deficits

## 2017-10-22 ENCOUNTER — RESULT REVIEW (OUTPATIENT)
Age: 82
End: 2017-10-22

## 2017-10-22 LAB
ALBUMIN SERPL ELPH-MCNC: 3.5 G/DL — SIGNIFICANT CHANGE UP (ref 3.3–5)
ALP SERPL-CCNC: 87 U/L — SIGNIFICANT CHANGE UP (ref 40–120)
ALT FLD-CCNC: 10 U/L — SIGNIFICANT CHANGE UP (ref 10–45)
ANION GAP SERPL CALC-SCNC: 22 MMOL/L — HIGH (ref 5–17)
APTT BLD: 27.2 SEC — LOW (ref 27.5–37.4)
AST SERPL-CCNC: 15 U/L — SIGNIFICANT CHANGE UP (ref 10–40)
B-OH-BUTYR SERPL-SCNC: 0.5 MMOL/L — HIGH
BILIRUB SERPL-MCNC: 0.4 MG/DL — SIGNIFICANT CHANGE UP (ref 0.2–1.2)
BLD GP AB SCN SERPL QL: NEGATIVE — SIGNIFICANT CHANGE UP
BUN SERPL-MCNC: 10 MG/DL — SIGNIFICANT CHANGE UP (ref 7–23)
CALCIUM SERPL-MCNC: 9.5 MG/DL — SIGNIFICANT CHANGE UP (ref 8.4–10.5)
CHLORIDE SERPL-SCNC: 92 MMOL/L — LOW (ref 96–108)
CO2 SERPL-SCNC: 20 MMOL/L — LOW (ref 22–31)
CREAT SERPL-MCNC: 0.69 MG/DL — SIGNIFICANT CHANGE UP (ref 0.5–1.3)
GLUCOSE BLDC GLUCOMTR-MCNC: 146 MG/DL — HIGH (ref 70–99)
GLUCOSE BLDC GLUCOMTR-MCNC: 189 MG/DL — HIGH (ref 70–99)
GLUCOSE BLDC GLUCOMTR-MCNC: 194 MG/DL — HIGH (ref 70–99)
GLUCOSE SERPL-MCNC: 202 MG/DL — HIGH (ref 70–99)
HBA1C BLD-MCNC: 7.7 % — HIGH (ref 4–5.6)
HCT VFR BLD CALC: 39 % — SIGNIFICANT CHANGE UP (ref 34.5–45)
HGB BLD-MCNC: 12.9 G/DL — SIGNIFICANT CHANGE UP (ref 11.5–15.5)
INR BLD: 1.03 — SIGNIFICANT CHANGE UP (ref 0.88–1.16)
MAGNESIUM SERPL-MCNC: 1.7 MG/DL — SIGNIFICANT CHANGE UP (ref 1.6–2.6)
MCHC RBC-ENTMCNC: 27.4 PG — SIGNIFICANT CHANGE UP (ref 27–34)
MCHC RBC-ENTMCNC: 33.1 G/DL — SIGNIFICANT CHANGE UP (ref 32–36)
MCV RBC AUTO: 83 FL — SIGNIFICANT CHANGE UP (ref 80–100)
PHOSPHATE SERPL-MCNC: 3.3 MG/DL — SIGNIFICANT CHANGE UP (ref 2.5–4.5)
PLATELET # BLD AUTO: 268 K/UL — SIGNIFICANT CHANGE UP (ref 150–400)
POTASSIUM SERPL-MCNC: 3.8 MMOL/L — SIGNIFICANT CHANGE UP (ref 3.5–5.3)
POTASSIUM SERPL-SCNC: 3.8 MMOL/L — SIGNIFICANT CHANGE UP (ref 3.5–5.3)
PROT SERPL-MCNC: 7.1 G/DL — SIGNIFICANT CHANGE UP (ref 6–8.3)
PROTHROM AB SERPL-ACNC: 11.4 SEC — SIGNIFICANT CHANGE UP (ref 9.8–12.7)
RBC # BLD: 4.7 M/UL — SIGNIFICANT CHANGE UP (ref 3.8–5.2)
RBC # FLD: 14.6 % — SIGNIFICANT CHANGE UP (ref 10.3–16.9)
RH IG SCN BLD-IMP: POSITIVE — SIGNIFICANT CHANGE UP
SODIUM SERPL-SCNC: 134 MMOL/L — LOW (ref 135–145)
WBC # BLD: 23.6 K/UL — HIGH (ref 3.8–10.5)
WBC # FLD AUTO: 23.6 K/UL — HIGH (ref 3.8–10.5)

## 2017-10-22 PROCEDURE — 76705 ECHO EXAM OF ABDOMEN: CPT | Mod: 26

## 2017-10-22 PROCEDURE — 93010 ELECTROCARDIOGRAM REPORT: CPT

## 2017-10-22 RX ORDER — PIPERACILLIN AND TAZOBACTAM 4; .5 G/20ML; G/20ML
3.38 INJECTION, POWDER, LYOPHILIZED, FOR SOLUTION INTRAVENOUS EVERY 6 HOURS
Qty: 0 | Refills: 0 | Status: DISCONTINUED | OUTPATIENT
Start: 2017-10-22 | End: 2017-10-24

## 2017-10-22 RX ORDER — CEFOTETAN DISODIUM 1 G
2 VIAL (EA) INJECTION EVERY 12 HOURS
Qty: 0 | Refills: 0 | Status: DISCONTINUED | OUTPATIENT
Start: 2017-10-22 | End: 2017-10-22

## 2017-10-22 RX ORDER — CEFOTETAN DISODIUM 1 G
2 VIAL (EA) INJECTION ONCE
Qty: 0 | Refills: 0 | Status: COMPLETED | OUTPATIENT
Start: 2017-10-22 | End: 2017-10-22

## 2017-10-22 RX ORDER — ACETAMINOPHEN 500 MG
650 TABLET ORAL EVERY 6 HOURS
Qty: 0 | Refills: 0 | Status: DISCONTINUED | OUTPATIENT
Start: 2017-10-22 | End: 2017-10-25

## 2017-10-22 RX ORDER — INFLUENZA VIRUS VACCINE 15; 15; 15; 15 UG/.5ML; UG/.5ML; UG/.5ML; UG/.5ML
0.5 SUSPENSION INTRAMUSCULAR ONCE
Qty: 0 | Refills: 0 | Status: COMPLETED | OUTPATIENT
Start: 2017-10-22 | End: 2017-10-22

## 2017-10-22 RX ADMIN — SODIUM CHLORIDE 100 MILLILITER(S): 9 INJECTION INTRAMUSCULAR; INTRAVENOUS; SUBCUTANEOUS at 18:57

## 2017-10-22 RX ADMIN — Medication 1: at 13:46

## 2017-10-22 RX ADMIN — Medication 1: at 07:25

## 2017-10-22 RX ADMIN — HEPARIN SODIUM 5000 UNIT(S): 5000 INJECTION INTRAVENOUS; SUBCUTANEOUS at 06:24

## 2017-10-22 RX ADMIN — Medication 100 GRAM(S): at 00:36

## 2017-10-22 RX ADMIN — PIPERACILLIN AND TAZOBACTAM 200 GRAM(S): 4; .5 INJECTION, POWDER, LYOPHILIZED, FOR SOLUTION INTRAVENOUS at 12:53

## 2017-10-22 RX ADMIN — Medication 650 MILLIGRAM(S): at 09:40

## 2017-10-22 RX ADMIN — MORPHINE SULFATE 2 MILLIGRAM(S): 50 CAPSULE, EXTENDED RELEASE ORAL at 09:40

## 2017-10-22 RX ADMIN — Medication 2: at 00:37

## 2017-10-22 RX ADMIN — MORPHINE SULFATE 2 MILLIGRAM(S): 50 CAPSULE, EXTENDED RELEASE ORAL at 09:55

## 2017-10-22 RX ADMIN — PIPERACILLIN AND TAZOBACTAM 200 GRAM(S): 4; .5 INJECTION, POWDER, LYOPHILIZED, FOR SOLUTION INTRAVENOUS at 18:56

## 2017-10-22 RX ADMIN — SODIUM CHLORIDE 100 MILLILITER(S): 9 INJECTION INTRAMUSCULAR; INTRAVENOUS; SUBCUTANEOUS at 00:36

## 2017-10-22 NOTE — BRIEF OPERATIVE NOTE - PROCEDURE
<<-----Click on this checkbox to enter Procedure Cholecystectomy, laparoscopic  10/22/2017    Active  CCRIPPS

## 2017-10-22 NOTE — PROGRESS NOTE ADULT - SUBJECTIVE AND OBJECTIVE BOX
SUBJECTIVE: Pt seen and examined bedside. She reports that her pain is under control but her RUQ is very tender when touched.    Vital Signs Last 24 Hrs  T(C): 37.8 (22 Oct 2017 06:15), Max: 37.8 (22 Oct 2017 06:15)  T(F): 100.1 (22 Oct 2017 06:15), Max: 100.1 (22 Oct 2017 06:15)  HR: 86 (22 Oct 2017 06:15) (84 - 93)  BP: 150/60 (22 Oct 2017 06:15) (150/60 - 205/80)  BP(mean): --  RR: 17 (22 Oct 2017 06:15) (16 - 18)  SpO2: 96% (22 Oct 2017 06:15) (96% - 100%)            General Appearance: Appears well, NAD  Neck: Supple  Chest: Equal expansion bilaterally, equal breath sounds  CV: Pulse regular presently  Abdomen: Soft, nondistended, RUQ tenderness  Extremities: Grossly symmetric, SCD's in place     I&O's Summary    21 Oct 2017 07:  -  22 Oct 2017 07:00  --------------------------------------------------------  IN: 900 mL / OUT: 1150 mL / NET: -250 mL      I&O's Detail    21 Oct 2017 07:01  -  22 Oct 2017 07:00  --------------------------------------------------------  IN:    IV PiggyBack: 900 mL  Total IN: 900 mL    OUT:    Voided: 1150 mL  Total OUT: 1150 mL    Total NET: -250 mL          MEDICATIONS  (STANDING):  heparin  Injectable 5000 Unit(s) SubCutaneous every 8 hours  influenza   Vaccine 0.5 milliLiter(s) IntraMuscular once  insulin lispro (HumaLOG) corrective regimen sliding scale   SubCutaneous every 6 hours  piperacillin/tazobactam IVPB. 3.375 Gram(s) IV Intermittent every 6 hours  sodium chloride 0.9%. 1000 milliLiter(s) (100 mL/Hr) IV Continuous <Continuous>    MEDICATIONS  (PRN):  acetaminophen   Tablet 650 milliGRAM(s) Oral every 6 hours PRN For Temp greater than 38.5 C (101.3 F)  morphine  - Injectable 2 milliGRAM(s) IV Push every 4 hours PRN Moderate Pain (4 - 6)  ondansetron Injectable 4 milliGRAM(s) IV Push every 4 hours PRN Nausea and/or Vomiting      LABS:                        12.9   23.6  )-----------( 268      ( 22 Oct 2017 08:32 )             39.0     10    135  |  99  |  12  ----------------------------<  242<H>  4.3   |  26  |  0.76    Ca    9.9      21 Oct 2017 16:46    TPro  8.4<H>  /  Alb  4.1  /  TBili  0.5  /  DBili  x   /  AST  20  /  ALT  15  /  AlkPhos  121<H>  10    PT/INR - ( 21 Oct 2017 16:46 )   PT: 11.2 sec;   INR: 1.02          PTT - ( 21 Oct 2017 16:46 )  PTT:29.3 sec  Urinalysis Basic - ( 21 Oct 2017 16:46 )    Color: Yellow / Appearance: Clear / S.020 / pH: x  Gluc: x / Ketone: 15 mg/dL  / Bili: NEGATIVE / Urobili: 0.2 E.U./dL   Blood: x / Protein: 100 mg/dL / Nitrite: NEGATIVE   Leuk Esterase: NEGATIVE / RBC: < 5 /HPF / WBC < 5 /HPF   Sq Epi: x / Non Sq Epi: Few /HPF / Bacteria: Present /HPF

## 2017-10-22 NOTE — BRIEF OPERATIVE NOTE - OPERATION/FINDINGS
Chronically inflamed gallbladder with significant edema and surrounding inflammation; identification of all critical structures. Cystic duct ligated with Endo-OUSMANE stapler; cystic artery ligated with Hemolock clips.

## 2017-10-23 LAB
ALBUMIN SERPL ELPH-MCNC: 2.4 G/DL — LOW (ref 3.3–5)
ALP SERPL-CCNC: 77 U/L — SIGNIFICANT CHANGE UP (ref 40–120)
ALT FLD-CCNC: 56 U/L — HIGH (ref 10–45)
ANION GAP SERPL CALC-SCNC: 13 MMOL/L — SIGNIFICANT CHANGE UP (ref 5–17)
AST SERPL-CCNC: 72 U/L — HIGH (ref 10–40)
BILIRUB SERPL-MCNC: 0.5 MG/DL — SIGNIFICANT CHANGE UP (ref 0.2–1.2)
BUN SERPL-MCNC: 17 MG/DL — SIGNIFICANT CHANGE UP (ref 7–23)
CALCIUM SERPL-MCNC: 8.4 MG/DL — SIGNIFICANT CHANGE UP (ref 8.4–10.5)
CHLORIDE SERPL-SCNC: 98 MMOL/L — SIGNIFICANT CHANGE UP (ref 96–108)
CO2 SERPL-SCNC: 23 MMOL/L — SIGNIFICANT CHANGE UP (ref 22–31)
CREAT SERPL-MCNC: 0.93 MG/DL — SIGNIFICANT CHANGE UP (ref 0.5–1.3)
CULTURE RESULTS: NO GROWTH — SIGNIFICANT CHANGE UP
GLUCOSE BLDC GLUCOMTR-MCNC: 140 MG/DL — HIGH (ref 70–99)
GLUCOSE BLDC GLUCOMTR-MCNC: 148 MG/DL — HIGH (ref 70–99)
GLUCOSE BLDC GLUCOMTR-MCNC: 236 MG/DL — HIGH (ref 70–99)
GLUCOSE SERPL-MCNC: 172 MG/DL — HIGH (ref 70–99)
GRAM STN FLD: SIGNIFICANT CHANGE UP
GRAM STN FLD: SIGNIFICANT CHANGE UP
HCT VFR BLD CALC: 32.3 % — LOW (ref 34.5–45)
HGB BLD-MCNC: 10.7 G/DL — LOW (ref 11.5–15.5)
MAGNESIUM SERPL-MCNC: 1.5 MG/DL — LOW (ref 1.6–2.6)
MCHC RBC-ENTMCNC: 27.4 PG — SIGNIFICANT CHANGE UP (ref 27–34)
MCHC RBC-ENTMCNC: 33.1 G/DL — SIGNIFICANT CHANGE UP (ref 32–36)
MCV RBC AUTO: 82.8 FL — SIGNIFICANT CHANGE UP (ref 80–100)
PHOSPHATE SERPL-MCNC: 2.6 MG/DL — SIGNIFICANT CHANGE UP (ref 2.5–4.5)
PLATELET # BLD AUTO: 221 K/UL — SIGNIFICANT CHANGE UP (ref 150–400)
POTASSIUM SERPL-MCNC: 4 MMOL/L — SIGNIFICANT CHANGE UP (ref 3.5–5.3)
POTASSIUM SERPL-SCNC: 4 MMOL/L — SIGNIFICANT CHANGE UP (ref 3.5–5.3)
PROT SERPL-MCNC: 5.8 G/DL — LOW (ref 6–8.3)
RBC # BLD: 3.9 M/UL — SIGNIFICANT CHANGE UP (ref 3.8–5.2)
RBC # FLD: 14.8 % — SIGNIFICANT CHANGE UP (ref 10.3–16.9)
SODIUM SERPL-SCNC: 134 MMOL/L — LOW (ref 135–145)
SPECIMEN SOURCE: SIGNIFICANT CHANGE UP
WBC # BLD: 16.4 K/UL — HIGH (ref 3.8–10.5)
WBC # FLD AUTO: 16.4 K/UL — HIGH (ref 3.8–10.5)

## 2017-10-23 RX ORDER — HYDROMORPHONE HYDROCHLORIDE 2 MG/ML
1 INJECTION INTRAMUSCULAR; INTRAVENOUS; SUBCUTANEOUS EVERY 4 HOURS
Qty: 0 | Refills: 0 | Status: DISCONTINUED | OUTPATIENT
Start: 2017-10-23 | End: 2017-10-23

## 2017-10-23 RX ORDER — SODIUM CHLORIDE 9 MG/ML
1000 INJECTION INTRAMUSCULAR; INTRAVENOUS; SUBCUTANEOUS
Qty: 0 | Refills: 0 | Status: DISCONTINUED | OUTPATIENT
Start: 2017-10-23 | End: 2017-10-25

## 2017-10-23 RX ORDER — HYDROMORPHONE HYDROCHLORIDE 2 MG/ML
0.5 INJECTION INTRAMUSCULAR; INTRAVENOUS; SUBCUTANEOUS EVERY 4 HOURS
Qty: 0 | Refills: 0 | Status: DISCONTINUED | OUTPATIENT
Start: 2017-10-23 | End: 2017-10-23

## 2017-10-23 RX ORDER — OXYCODONE AND ACETAMINOPHEN 5; 325 MG/1; MG/1
2 TABLET ORAL EVERY 4 HOURS
Qty: 0 | Refills: 0 | Status: DISCONTINUED | OUTPATIENT
Start: 2017-10-23 | End: 2017-10-25

## 2017-10-23 RX ORDER — MAGNESIUM OXIDE 400 MG ORAL TABLET 241.3 MG
400 TABLET ORAL ONCE
Qty: 0 | Refills: 0 | Status: COMPLETED | OUTPATIENT
Start: 2017-10-23 | End: 2017-10-23

## 2017-10-23 RX ORDER — HYDROMORPHONE HYDROCHLORIDE 2 MG/ML
0.5 INJECTION INTRAMUSCULAR; INTRAVENOUS; SUBCUTANEOUS EVERY 4 HOURS
Qty: 0 | Refills: 0 | Status: DISCONTINUED | OUTPATIENT
Start: 2017-10-23 | End: 2017-10-25

## 2017-10-23 RX ORDER — OXYCODONE AND ACETAMINOPHEN 5; 325 MG/1; MG/1
1 TABLET ORAL EVERY 4 HOURS
Qty: 0 | Refills: 0 | Status: DISCONTINUED | OUTPATIENT
Start: 2017-10-23 | End: 2017-10-25

## 2017-10-23 RX ADMIN — PIPERACILLIN AND TAZOBACTAM 200 GRAM(S): 4; .5 INJECTION, POWDER, LYOPHILIZED, FOR SOLUTION INTRAVENOUS at 06:40

## 2017-10-23 RX ADMIN — MAGNESIUM OXIDE 400 MG ORAL TABLET 400 MILLIGRAM(S): 241.3 TABLET ORAL at 13:03

## 2017-10-23 RX ADMIN — PIPERACILLIN AND TAZOBACTAM 200 GRAM(S): 4; .5 INJECTION, POWDER, LYOPHILIZED, FOR SOLUTION INTRAVENOUS at 00:04

## 2017-10-23 RX ADMIN — HYDROMORPHONE HYDROCHLORIDE 0.5 MILLIGRAM(S): 2 INJECTION INTRAMUSCULAR; INTRAVENOUS; SUBCUTANEOUS at 00:56

## 2017-10-23 RX ADMIN — Medication 2: at 06:40

## 2017-10-23 RX ADMIN — HYDROMORPHONE HYDROCHLORIDE 1 MILLIGRAM(S): 2 INJECTION INTRAMUSCULAR; INTRAVENOUS; SUBCUTANEOUS at 13:03

## 2017-10-23 RX ADMIN — MORPHINE SULFATE 2 MILLIGRAM(S): 50 CAPSULE, EXTENDED RELEASE ORAL at 00:26

## 2017-10-23 RX ADMIN — MORPHINE SULFATE 2 MILLIGRAM(S): 50 CAPSULE, EXTENDED RELEASE ORAL at 00:50

## 2017-10-23 RX ADMIN — HEPARIN SODIUM 5000 UNIT(S): 5000 INJECTION INTRAVENOUS; SUBCUTANEOUS at 06:40

## 2017-10-23 RX ADMIN — HYDROMORPHONE HYDROCHLORIDE 0.5 MILLIGRAM(S): 2 INJECTION INTRAMUSCULAR; INTRAVENOUS; SUBCUTANEOUS at 01:30

## 2017-10-23 RX ADMIN — PIPERACILLIN AND TAZOBACTAM 200 GRAM(S): 4; .5 INJECTION, POWDER, LYOPHILIZED, FOR SOLUTION INTRAVENOUS at 17:37

## 2017-10-23 RX ADMIN — HYDROMORPHONE HYDROCHLORIDE 0.5 MILLIGRAM(S): 2 INJECTION INTRAMUSCULAR; INTRAVENOUS; SUBCUTANEOUS at 10:00

## 2017-10-23 RX ADMIN — HEPARIN SODIUM 5000 UNIT(S): 5000 INJECTION INTRAVENOUS; SUBCUTANEOUS at 15:17

## 2017-10-23 RX ADMIN — PIPERACILLIN AND TAZOBACTAM 200 GRAM(S): 4; .5 INJECTION, POWDER, LYOPHILIZED, FOR SOLUTION INTRAVENOUS at 12:17

## 2017-10-23 RX ADMIN — OXYCODONE AND ACETAMINOPHEN 1 TABLET(S): 5; 325 TABLET ORAL at 20:05

## 2017-10-23 RX ADMIN — HYDROMORPHONE HYDROCHLORIDE 1 MILLIGRAM(S): 2 INJECTION INTRAMUSCULAR; INTRAVENOUS; SUBCUTANEOUS at 13:45

## 2017-10-23 RX ADMIN — HYDROMORPHONE HYDROCHLORIDE 0.5 MILLIGRAM(S): 2 INJECTION INTRAMUSCULAR; INTRAVENOUS; SUBCUTANEOUS at 09:18

## 2017-10-23 RX ADMIN — HEPARIN SODIUM 5000 UNIT(S): 5000 INJECTION INTRAVENOUS; SUBCUTANEOUS at 22:24

## 2017-10-23 NOTE — PROGRESS NOTE ADULT - SUBJECTIVE AND OBJECTIVE BOX
POST-OPERATIVE NOTE    Procedure: laparoscopic cholecystectomy     Diagnosis/Indication: Cholecysitis    Surgeon: Dr Powell    S: Pt complaining of mild pain. Denies CP, SOB, MARLEY, calf tenderness. Pain controlled with medication. Denies nausea, vomiting.    O:  T(C): 36.4 (10-23-17 @ 01:40), Max: 36.5 (10-23-17 @ 00:53)  T(F): 97.6 (10-23-17 @ 01:40), Max: 97.7 (10-23-17 @ 00:53)  HR: 82 (10-23-17 @ 01:40) (82 - 94)  BP: 135/60 (10-23-17 @ 01:40) (135/60 - 162/65)  RR: 18 (10-23-17 @ 01:40) (9 - 18)  SpO2: 94% (10-23-17 @ 01:40) (94% - 100%)  Wt(kg): --                        12.9   23.6  )-----------( 268      ( 22 Oct 2017 08:32 )             39.0     10-22    134<L>  |  92<L>  |  10  ----------------------------<  202<H>  3.8   |  20<L>  |  0.69    Ca    9.5      22 Oct 2017 08:32  Phos  3.3     10-22  Mg     1.7     10-22    TPro  7.1  /  Alb  3.5  /  TBili  0.4  /  DBili  x   /  AST  15  /  ALT  10  /  AlkPhos  87  10-22      Gen: NAD, resting comfortably in bed  C/V: NSR  Pulm: Nonlabored breathing, no respiratory distress  Abd: voluntary guarding, nondistended, tenderness to light palpation. drain in place  Extrem: WWP, no calf edema or tenderness, SCDs in place      A/P: 82y Female s/p above procedure  Diet: NPO  IVF: NS @ 100  Pain/nausea control  DVT ppx: sqh, scd  Dispo plan: Tele

## 2017-10-23 NOTE — PROGRESS NOTE ADULT - SUBJECTIVE AND OBJECTIVE BOX
ON: Laparoscopic cholecystectomy. POC WNL  10/22: added on for OR        82 year old female with DM, cholithiasis presented with acute cholecystitis  now s/p laparoscopic cholecystectomy    -Pain/nausea control  -IVF  -NPO  -Zosyn  -SQH, SCD    Drain ON: Laparoscopic cholecystectomy. POC WNL  10/22: added on for OR    SUBJECTIVE:  Flatus: [ ] YES [x ] NO             Bowel Movement: [ ] YES [x ] NO  Pain Control Adequate: [x ] YES [ ] NO  Nausea: [ ] YES [x ] NO            Vomiting: [ ] YES [x ] NO  Diarrhea: [ ] YES [ x] NO         Constipation: [ ] YES [x ] NO     Chest Pain: [ ] YES [x ] NO    SOB:  [ ] YES [x ] NO    MEDICATIONS  (STANDING):  heparin  Injectable 5000 Unit(s) SubCutaneous every 8 hours  influenza   Vaccine 0.5 milliLiter(s) IntraMuscular once  insulin lispro (HumaLOG) corrective regimen sliding scale   SubCutaneous every 6 hours  piperacillin/tazobactam IVPB. 3.375 Gram(s) IV Intermittent every 6 hours  sodium chloride 0.9%. 1000 milliLiter(s) (100 mL/Hr) IV Continuous <Continuous>    MEDICATIONS  (PRN):  acetaminophen   Tablet 650 milliGRAM(s) Oral every 6 hours PRN For Temp greater than 38.5 C (101.3 F)  HYDROmorphone  Injectable 0.5 milliGRAM(s) IV Push every 4 hours PRN Moderate Pain (4 - 6)  HYDROmorphone  Injectable 1 milliGRAM(s) IV Push every 4 hours PRN Severe Pain (7 - 10)  ondansetron Injectable 4 milliGRAM(s) IV Push every 4 hours PRN Nausea and/or Vomiting      Vital Signs Last 24 Hrs  T(C): 36.6 (23 Oct 2017 05:32), Max: 38.2 (22 Oct 2017 08:45)  T(F): 97.9 (23 Oct 2017 05:32), Max: 100.7 (22 Oct 2017 08:45)  HR: 82 (23 Oct 2017 04:55) (74 - 97)  BP: 167/76 (23 Oct 2017 04:55) (135/60 - 167/76)  BP(mean): 109 (23 Oct 2017 04:55) (109 - 109)  RR: 18 (23 Oct 2017 04:55) (9 - 18)  SpO2: 96% (23 Oct 2017 04:55) (94% - 100%)    PHYSICAL EXAM:      Constitutional: NAD, A&Ox3    Gastrointestinal: Soft, attp in RUQ, ND, JPx1, No guarding or rebound    Extremities: no peripheral edema    Incision: CDI        I&O's Detail    22 Oct 2017 07:01  -  23 Oct 2017 07:00  --------------------------------------------------------  IN:    IV PiggyBack: 200 mL    sodium chloride 0.9%.: 1400 mL  Total IN: 1600 mL    OUT:    Bulb: 95 mL    Voided: 350 mL  Total OUT: 445 mL    Total NET: 1155 mL          LABS:                        12.9   23.6  )-----------( 268      ( 22 Oct 2017 08:32 )             39.0     10    134<L>  |  92<L>  |  10  ----------------------------<  202<H>  3.8   |  20<L>  |  0.69    Ca    9.5      22 Oct 2017 08:32  Phos  3.3     10-  Mg     1.7     10-22    TPro  7.1  /  Alb  3.5  /  TBili  0.4  /  DBili  x   /  AST  15  /  ALT  10  /  AlkPhos  87  10    PT/INR - ( 22 Oct 2017 08:32 )   PT: 11.4 sec;   INR: 1.03          PTT - ( 22 Oct 2017 08:32 )  PTT:27.2 sec  Urinalysis Basic - ( 21 Oct 2017 16:46 )    Color: Yellow / Appearance: Clear / S.020 / pH: x  Gluc: x / Ketone: 15 mg/dL  / Bili: NEGATIVE / Urobili: 0.2 E.U./dL   Blood: x / Protein: 100 mg/dL / Nitrite: NEGATIVE   Leuk Esterase: NEGATIVE / RBC: < 5 /HPF / WBC < 5 /HPF   Sq Epi: x / Non Sq Epi: Few /HPF / Bacteria: Present /HPF          RADIOLOGY & ADDITIONAL STUDIES:    82 year old female with DM, cholithiasis presented with acute cholecystitis  now s/p laparoscopic cholecystectomy    -Pain/nausea control  -IVF  -NPO  -Zosyn  -SQH, SCD    Drain

## 2017-10-24 LAB
-  AMPICILLIN/SULBACTAM: SIGNIFICANT CHANGE UP
-  AMPICILLIN: SIGNIFICANT CHANGE UP
-  CEFAZOLIN: SIGNIFICANT CHANGE UP
-  CEFTRIAXONE: SIGNIFICANT CHANGE UP
-  CIPROFLOXACIN: SIGNIFICANT CHANGE UP
-  GENTAMICIN: SIGNIFICANT CHANGE UP
-  PIPERACILLIN/TAZOBACTAM: SIGNIFICANT CHANGE UP
-  TOBRAMYCIN: SIGNIFICANT CHANGE UP
-  TRIMETHOPRIM/SULFAMETHOXAZOLE: SIGNIFICANT CHANGE UP
ALBUMIN SERPL ELPH-MCNC: 2.7 G/DL — LOW (ref 3.3–5)
ALP SERPL-CCNC: 80 U/L — SIGNIFICANT CHANGE UP (ref 40–120)
ALT FLD-CCNC: 51 U/L — HIGH (ref 10–45)
ANION GAP SERPL CALC-SCNC: 11 MMOL/L — SIGNIFICANT CHANGE UP (ref 5–17)
AST SERPL-CCNC: 52 U/L — HIGH (ref 10–40)
BILIRUB SERPL-MCNC: 0.5 MG/DL — SIGNIFICANT CHANGE UP (ref 0.2–1.2)
BUN SERPL-MCNC: 14 MG/DL — SIGNIFICANT CHANGE UP (ref 7–23)
CALCIUM SERPL-MCNC: 8.7 MG/DL — SIGNIFICANT CHANGE UP (ref 8.4–10.5)
CHLORIDE SERPL-SCNC: 97 MMOL/L — SIGNIFICANT CHANGE UP (ref 96–108)
CO2 SERPL-SCNC: 25 MMOL/L — SIGNIFICANT CHANGE UP (ref 22–31)
CREAT SERPL-MCNC: 0.87 MG/DL — SIGNIFICANT CHANGE UP (ref 0.5–1.3)
CULTURE RESULTS: SIGNIFICANT CHANGE UP
GLUCOSE BLDC GLUCOMTR-MCNC: 124 MG/DL — HIGH (ref 70–99)
GLUCOSE BLDC GLUCOMTR-MCNC: 128 MG/DL — HIGH (ref 70–99)
GLUCOSE BLDC GLUCOMTR-MCNC: 132 MG/DL — HIGH (ref 70–99)
GLUCOSE BLDC GLUCOMTR-MCNC: 139 MG/DL — HIGH (ref 70–99)
GLUCOSE SERPL-MCNC: 139 MG/DL — HIGH (ref 70–99)
HCT VFR BLD CALC: 32.4 % — LOW (ref 34.5–45)
HGB BLD-MCNC: 10.3 G/DL — LOW (ref 11.5–15.5)
MAGNESIUM SERPL-MCNC: 1.8 MG/DL — SIGNIFICANT CHANGE UP (ref 1.6–2.6)
MCHC RBC-ENTMCNC: 27 PG — SIGNIFICANT CHANGE UP (ref 27–34)
MCHC RBC-ENTMCNC: 31.8 G/DL — LOW (ref 32–36)
MCV RBC AUTO: 84.8 FL — SIGNIFICANT CHANGE UP (ref 80–100)
METHOD TYPE: SIGNIFICANT CHANGE UP
ORGANISM # SPEC MICROSCOPIC CNT: SIGNIFICANT CHANGE UP
ORGANISM # SPEC MICROSCOPIC CNT: SIGNIFICANT CHANGE UP
PHOSPHATE SERPL-MCNC: 1.3 MG/DL — LOW (ref 2.5–4.5)
PLATELET # BLD AUTO: 239 K/UL — SIGNIFICANT CHANGE UP (ref 150–400)
POTASSIUM SERPL-MCNC: 3.3 MMOL/L — LOW (ref 3.5–5.3)
POTASSIUM SERPL-SCNC: 3.3 MMOL/L — LOW (ref 3.5–5.3)
PROT SERPL-MCNC: 5.8 G/DL — LOW (ref 6–8.3)
RBC # BLD: 3.82 M/UL — SIGNIFICANT CHANGE UP (ref 3.8–5.2)
RBC # FLD: 14.6 % — SIGNIFICANT CHANGE UP (ref 10.3–16.9)
SODIUM SERPL-SCNC: 133 MMOL/L — LOW (ref 135–145)
SPECIMEN SOURCE: SIGNIFICANT CHANGE UP
WBC # BLD: 13.1 K/UL — HIGH (ref 3.8–10.5)
WBC # FLD AUTO: 13.1 K/UL — HIGH (ref 3.8–10.5)

## 2017-10-24 RX ORDER — POTASSIUM CHLORIDE 20 MEQ
40 PACKET (EA) ORAL EVERY 4 HOURS
Qty: 0 | Refills: 0 | Status: DISCONTINUED | OUTPATIENT
Start: 2017-10-24 | End: 2017-10-24

## 2017-10-24 RX ORDER — POTASSIUM CHLORIDE 20 MEQ
40 PACKET (EA) ORAL ONCE
Qty: 0 | Refills: 0 | Status: DISCONTINUED | OUTPATIENT
Start: 2017-10-24 | End: 2017-10-24

## 2017-10-24 RX ORDER — POTASSIUM CHLORIDE 20 MEQ
20 PACKET (EA) ORAL ONCE
Qty: 0 | Refills: 0 | Status: COMPLETED | OUTPATIENT
Start: 2017-10-24 | End: 2017-10-24

## 2017-10-24 RX ORDER — MAGNESIUM SULFATE 500 MG/ML
1 VIAL (ML) INJECTION ONCE
Qty: 0 | Refills: 0 | Status: COMPLETED | OUTPATIENT
Start: 2017-10-24 | End: 2017-10-24

## 2017-10-24 RX ORDER — POTASSIUM PHOSPHATE, MONOBASIC POTASSIUM PHOSPHATE, DIBASIC 236; 224 MG/ML; MG/ML
30 INJECTION, SOLUTION INTRAVENOUS ONCE
Qty: 0 | Refills: 0 | Status: COMPLETED | OUTPATIENT
Start: 2017-10-24 | End: 2017-10-24

## 2017-10-24 RX ADMIN — OXYCODONE AND ACETAMINOPHEN 1 TABLET(S): 5; 325 TABLET ORAL at 01:19

## 2017-10-24 RX ADMIN — PIPERACILLIN AND TAZOBACTAM 200 GRAM(S): 4; .5 INJECTION, POWDER, LYOPHILIZED, FOR SOLUTION INTRAVENOUS at 11:29

## 2017-10-24 RX ADMIN — Medication 40 MILLIEQUIVALENT(S): at 11:19

## 2017-10-24 RX ADMIN — Medication 20 MILLIEQUIVALENT(S): at 21:58

## 2017-10-24 RX ADMIN — PIPERACILLIN AND TAZOBACTAM 200 GRAM(S): 4; .5 INJECTION, POWDER, LYOPHILIZED, FOR SOLUTION INTRAVENOUS at 00:36

## 2017-10-24 RX ADMIN — OXYCODONE AND ACETAMINOPHEN 1 TABLET(S): 5; 325 TABLET ORAL at 13:00

## 2017-10-24 RX ADMIN — HEPARIN SODIUM 5000 UNIT(S): 5000 INJECTION INTRAVENOUS; SUBCUTANEOUS at 21:58

## 2017-10-24 RX ADMIN — OXYCODONE AND ACETAMINOPHEN 1 TABLET(S): 5; 325 TABLET ORAL at 01:49

## 2017-10-24 RX ADMIN — PIPERACILLIN AND TAZOBACTAM 200 GRAM(S): 4; .5 INJECTION, POWDER, LYOPHILIZED, FOR SOLUTION INTRAVENOUS at 05:31

## 2017-10-24 RX ADMIN — HEPARIN SODIUM 5000 UNIT(S): 5000 INJECTION INTRAVENOUS; SUBCUTANEOUS at 05:31

## 2017-10-24 RX ADMIN — OXYCODONE AND ACETAMINOPHEN 1 TABLET(S): 5; 325 TABLET ORAL at 11:19

## 2017-10-24 RX ADMIN — HEPARIN SODIUM 5000 UNIT(S): 5000 INJECTION INTRAVENOUS; SUBCUTANEOUS at 13:18

## 2017-10-24 RX ADMIN — Medication 1 TABLET(S): at 17:26

## 2017-10-24 RX ADMIN — Medication 100 GRAM(S): at 13:18

## 2017-10-24 RX ADMIN — POTASSIUM PHOSPHATE, MONOBASIC POTASSIUM PHOSPHATE, DIBASIC 85 MILLIMOLE(S): 236; 224 INJECTION, SOLUTION INTRAVENOUS at 16:00

## 2017-10-24 RX ADMIN — SODIUM CHLORIDE 50 MILLILITER(S): 9 INJECTION INTRAMUSCULAR; INTRAVENOUS; SUBCUTANEOUS at 05:31

## 2017-10-24 NOTE — PROGRESS NOTE ADULT - ASSESSMENT
82 year old female with DM, cholithiasis presents with acute cholecystitis    -Going to OR today  -Pain/nausea control  -IV ABx  -Diet: NPO  -DVTppx: Subq Hep
82 year old female with DM, cholelithiasis presented with acute cholecystitis  now s/p laparoscopic cholecystectomy    -Pain/nausea control  -IVF  -CLD  -Zosyn  -SQH, SCD  -OOB

## 2017-10-24 NOTE — PHYSICAL THERAPY INITIAL EVALUATION ADULT - PERTINENT HX OF CURRENT PROBLEM, REHAB EVAL
82 year old healthy female with a history of diabetes with no significant surgical history other then a recent hip fracture, presents as a transfer from [facility] with acute cholecystitis. The patient reports pain / nausea / anorexia x 24 hours. The patient denies fevers or any other symptoms The patient has a history of cholelithiasis but not previous bouts of biliary colic or acute cholecystitis. CT scan demonstrated acute cholecystitis

## 2017-10-24 NOTE — PHYSICAL THERAPY INITIAL EVALUATION ADULT - ADDITIONAL COMMENTS
Patient lives in apartment, no steps to enter. Patient denies DME, history of falls, or home health assistance.

## 2017-10-24 NOTE — PROGRESS NOTE ADULT - SUBJECTIVE AND OBJECTIVE BOX
ON: OOB passing small flatus no BM.   10/23: tolerating CLD, WBC improving, CLAY removed        82 year old female with DM, cholelithiasis presented with acute cholecystitis  now s/p laparoscopic cholecystectomy    -Pain/nausea control  -IVF  -CLD  -Zosyn  -SQH, SCD    Drain ON: OOB passing small flatus no BM.   10/23: tolerating CLD, WBC improving, CLAY removed    STATUS POST:  laparoscopic cholecystectomy    POST OPERATIVE DAY #: 2    Vital Signs Last 24 Hrs  T(C): 36.3 (24 Oct 2017 05:13), Max: 38.2 (23 Oct 2017 22:09)  T(F): 97.4 (24 Oct 2017 05:13), Max: 100.8 (23 Oct 2017 22:09)  HR: 78 (24 Oct 2017 05:17) (78 - 92)  BP: 149/70 (24 Oct 2017 05:17) (98/53 - 149/70)  BP(mean): 100 (24 Oct 2017 05:17) (73 - 100)  RR: 18 (24 Oct 2017 05:17) (16 - 19)  SpO2: 99% (24 Oct 2017 05:17) (94% - 99%)      SUBJECTIVE: Pt seen and examined bedside. She has had flatus but no BM. Her pain is controlled.      General Appearance: Appears well, NAD  Chest: Equal expansion bilaterally, equal breath sounds  CV: Pulse regular presently  Abdomen: Soft, nontense, appropriate incisional tenderness, dressings clean and dry and intact  Extremities: Grossly symmetric, SCD's in place     I&O's Summary    23 Oct 2017 07:01  -  24 Oct 2017 07:00  --------------------------------------------------------  IN: 2550 mL / OUT: 1740 mL / NET: 810 mL      I&O's Detail    23 Oct 2017 07:01  -  24 Oct 2017 07:00  --------------------------------------------------------  IN:    IV PiggyBack: 300 mL    Oral Fluid: 900 mL    sodium chloride 0.9%: 500 mL    sodium chloride 0.9%.: 850 mL  Total IN: 2550 mL    OUT:    Bulb: 60 mL    Voided: 1680 mL  Total OUT: 1740 mL    Total NET: 810 mL          MEDICATIONS  (STANDING):  heparin  Injectable 5000 Unit(s) SubCutaneous every 8 hours  influenza   Vaccine 0.5 milliLiter(s) IntraMuscular once  insulin lispro (HumaLOG) corrective regimen sliding scale   SubCutaneous every 6 hours  piperacillin/tazobactam IVPB. 3.375 Gram(s) IV Intermittent every 6 hours  sodium chloride 0.9%. 1000 milliLiter(s) (50 mL/Hr) IV Continuous <Continuous>    MEDICATIONS  (PRN):  acetaminophen   Tablet 650 milliGRAM(s) Oral every 6 hours PRN For Temp greater than 38.5 C (101.3 F)  HYDROmorphone  Injectable 0.5 milliGRAM(s) IV Push every 4 hours PRN breakthrough pain  ondansetron Injectable 4 milliGRAM(s) IV Push every 4 hours PRN Nausea and/or Vomiting  oxyCODONE    5 mG/acetaminophen 325 mG 1 Tablet(s) Oral every 4 hours PRN Moderate Pain (4 - 6)  oxyCODONE    5 mG/acetaminophen 325 mG 2 Tablet(s) Oral every 4 hours PRN Severe Pain (7 - 10)      LABS:                        10.7   16.4  )-----------( 221      ( 23 Oct 2017 11:47 )             32.3     10-23    134<L>  |  98  |  17  ----------------------------<  172<H>  4.0   |  23  |  0.93    Ca    8.4      23 Oct 2017 11:47  Phos  2.6     10-23  Mg     1.5     10-23    TPro  5.8<L>  /  Alb  2.4<L>  /  TBili  0.5  /  DBili  x   /  AST  72<H>  /  ALT  56<H>  /  AlkPhos  77  10-23    PT/INR - ( 22 Oct 2017 08:32 )   PT: 11.4 sec;   INR: 1.03          PTT - ( 22 Oct 2017 08:32 )  PTT:27.2 sec

## 2017-10-24 NOTE — PHYSICAL THERAPY INITIAL EVALUATION ADULT - GAIT DEVIATIONS NOTED, PT EVAL
decreased step length/decreased gait speed, mild unsteadiness, kyphotic posture/decreased stride length

## 2017-10-25 ENCOUNTER — TRANSCRIPTION ENCOUNTER (OUTPATIENT)
Age: 82
End: 2017-10-25

## 2017-10-25 VITALS
HEART RATE: 73 BPM | OXYGEN SATURATION: 96 % | SYSTOLIC BLOOD PRESSURE: 133 MMHG | TEMPERATURE: 98 F | RESPIRATION RATE: 16 BRPM | DIASTOLIC BLOOD PRESSURE: 66 MMHG

## 2017-10-25 LAB
ANION GAP SERPL CALC-SCNC: 13 MMOL/L — SIGNIFICANT CHANGE UP (ref 5–17)
BUN SERPL-MCNC: 12 MG/DL — SIGNIFICANT CHANGE UP (ref 7–23)
CALCIUM SERPL-MCNC: 8.9 MG/DL — SIGNIFICANT CHANGE UP (ref 8.4–10.5)
CHLORIDE SERPL-SCNC: 102 MMOL/L — SIGNIFICANT CHANGE UP (ref 96–108)
CO2 SERPL-SCNC: 23 MMOL/L — SIGNIFICANT CHANGE UP (ref 22–31)
CREAT SERPL-MCNC: 0.71 MG/DL — SIGNIFICANT CHANGE UP (ref 0.5–1.3)
CULTURE RESULTS: NO GROWTH — SIGNIFICANT CHANGE UP
GLUCOSE BLDC GLUCOMTR-MCNC: 120 MG/DL — HIGH (ref 70–99)
GLUCOSE BLDC GLUCOMTR-MCNC: 135 MG/DL — HIGH (ref 70–99)
GLUCOSE BLDC GLUCOMTR-MCNC: 95 MG/DL — SIGNIFICANT CHANGE UP (ref 70–99)
GLUCOSE SERPL-MCNC: 106 MG/DL — HIGH (ref 70–99)
HCT VFR BLD CALC: 32 % — LOW (ref 34.5–45)
HGB BLD-MCNC: 10.4 G/DL — LOW (ref 11.5–15.5)
MCHC RBC-ENTMCNC: 27 PG — SIGNIFICANT CHANGE UP (ref 27–34)
MCHC RBC-ENTMCNC: 32.5 G/DL — SIGNIFICANT CHANGE UP (ref 32–36)
MCV RBC AUTO: 83.1 FL — SIGNIFICANT CHANGE UP (ref 80–100)
PLATELET # BLD AUTO: 251 K/UL — SIGNIFICANT CHANGE UP (ref 150–400)
POTASSIUM SERPL-MCNC: 4.3 MMOL/L — SIGNIFICANT CHANGE UP (ref 3.5–5.3)
POTASSIUM SERPL-SCNC: 4.3 MMOL/L — SIGNIFICANT CHANGE UP (ref 3.5–5.3)
RBC # BLD: 3.85 M/UL — SIGNIFICANT CHANGE UP (ref 3.8–5.2)
RBC # FLD: 14.5 % — SIGNIFICANT CHANGE UP (ref 10.3–16.9)
SODIUM SERPL-SCNC: 138 MMOL/L — SIGNIFICANT CHANGE UP (ref 135–145)
SPECIMEN SOURCE: SIGNIFICANT CHANGE UP
WBC # BLD: 9.3 K/UL — SIGNIFICANT CHANGE UP (ref 3.8–10.5)
WBC # FLD AUTO: 9.3 K/UL — SIGNIFICANT CHANGE UP (ref 3.8–10.5)

## 2017-10-25 PROCEDURE — 86850 RBC ANTIBODY SCREEN: CPT

## 2017-10-25 PROCEDURE — 83690 ASSAY OF LIPASE: CPT

## 2017-10-25 PROCEDURE — 87070 CULTURE OTHR SPECIMN AEROBIC: CPT

## 2017-10-25 PROCEDURE — 97161 PT EVAL LOW COMPLEX 20 MIN: CPT

## 2017-10-25 PROCEDURE — 83605 ASSAY OF LACTIC ACID: CPT

## 2017-10-25 PROCEDURE — 96375 TX/PRO/DX INJ NEW DRUG ADDON: CPT

## 2017-10-25 PROCEDURE — 74177 CT ABD & PELVIS W/CONTRAST: CPT

## 2017-10-25 PROCEDURE — 97530 THERAPEUTIC ACTIVITIES: CPT

## 2017-10-25 PROCEDURE — 80053 COMPREHEN METABOLIC PANEL: CPT

## 2017-10-25 PROCEDURE — 80048 BASIC METABOLIC PNL TOTAL CA: CPT

## 2017-10-25 PROCEDURE — 71045 X-RAY EXAM CHEST 1 VIEW: CPT

## 2017-10-25 PROCEDURE — 86901 BLOOD TYPING SEROLOGIC RH(D): CPT

## 2017-10-25 PROCEDURE — 87186 SC STD MICRODIL/AGAR DIL: CPT

## 2017-10-25 PROCEDURE — 36415 COLL VENOUS BLD VENIPUNCTURE: CPT

## 2017-10-25 PROCEDURE — 85610 PROTHROMBIN TIME: CPT

## 2017-10-25 PROCEDURE — 88304 TISSUE EXAM BY PATHOLOGIST: CPT

## 2017-10-25 PROCEDURE — 93005 ELECTROCARDIOGRAM TRACING: CPT

## 2017-10-25 PROCEDURE — 85025 COMPLETE CBC W/AUTO DIFF WBC: CPT

## 2017-10-25 PROCEDURE — 71046 X-RAY EXAM CHEST 2 VIEWS: CPT

## 2017-10-25 PROCEDURE — 85730 THROMBOPLASTIN TIME PARTIAL: CPT

## 2017-10-25 PROCEDURE — 87205 SMEAR GRAM STAIN: CPT

## 2017-10-25 PROCEDURE — 86900 BLOOD TYPING SEROLOGIC ABO: CPT

## 2017-10-25 PROCEDURE — 82010 KETONE BODYS QUAN: CPT

## 2017-10-25 PROCEDURE — 85027 COMPLETE CBC AUTOMATED: CPT

## 2017-10-25 PROCEDURE — 81001 URINALYSIS AUTO W/SCOPE: CPT

## 2017-10-25 PROCEDURE — 96376 TX/PRO/DX INJ SAME DRUG ADON: CPT

## 2017-10-25 PROCEDURE — 83036 HEMOGLOBIN GLYCOSYLATED A1C: CPT

## 2017-10-25 PROCEDURE — 83735 ASSAY OF MAGNESIUM: CPT

## 2017-10-25 PROCEDURE — 87075 CULTR BACTERIA EXCEPT BLOOD: CPT

## 2017-10-25 PROCEDURE — 96374 THER/PROPH/DIAG INJ IV PUSH: CPT | Mod: XU

## 2017-10-25 PROCEDURE — 87086 URINE CULTURE/COLONY COUNT: CPT

## 2017-10-25 PROCEDURE — 99285 EMERGENCY DEPT VISIT HI MDM: CPT | Mod: 25

## 2017-10-25 PROCEDURE — 82962 GLUCOSE BLOOD TEST: CPT

## 2017-10-25 PROCEDURE — 76705 ECHO EXAM OF ABDOMEN: CPT

## 2017-10-25 PROCEDURE — 97116 GAIT TRAINING THERAPY: CPT

## 2017-10-25 PROCEDURE — 84100 ASSAY OF PHOSPHORUS: CPT

## 2017-10-25 RX ADMIN — HEPARIN SODIUM 5000 UNIT(S): 5000 INJECTION INTRAVENOUS; SUBCUTANEOUS at 05:53

## 2017-10-25 RX ADMIN — OXYCODONE AND ACETAMINOPHEN 1 TABLET(S): 5; 325 TABLET ORAL at 00:26

## 2017-10-25 RX ADMIN — OXYCODONE AND ACETAMINOPHEN 1 TABLET(S): 5; 325 TABLET ORAL at 00:40

## 2017-10-25 RX ADMIN — Medication 1 TABLET(S): at 05:53

## 2017-10-25 RX ADMIN — HEPARIN SODIUM 5000 UNIT(S): 5000 INJECTION INTRAVENOUS; SUBCUTANEOUS at 13:57

## 2017-10-25 NOTE — DISCHARGE NOTE ADULT - CARE PROVIDER_API CALL
Felix Powell), Surgery  [address] [place] [address]  Phone: [phone]  Fax: [phone]

## 2017-10-25 NOTE — DIETITIAN INITIAL EVALUATION ADULT. - ENERGY NEEDS
Height: 5'2" Weight: 122lbs, IBW 110lbs+/-10%, %%, BMI 22.3  ABW used for calculations as pt between % of IBW.   Needs adjusted 2/2 age and s/p surgery w/ surgical incision

## 2017-10-25 NOTE — DISCHARGE NOTE ADULT - HOSPITAL COURSE
83 yo F transferred to [facility] from [facility] on 10/21/2017 with CT findings consistent with acute cholecystitis and leukocytosis, patient was admitted under the General Surgery Service and taken to the OR for a laparoscopic cholecystectomy, procedure was uncomplicated and tolerated well by the patient. Post operatively patient remained on antibiotics tailored to culture and sensitivities diet was advanced as tolerated, and WBC normalized. Patient was evaluated by the patient and home PT recommended At time of discharge is tolerating a low fat regular diet, Home PT arranged, hemodynamically stable, given instructions for care and follow up.

## 2017-10-25 NOTE — DISCHARGE NOTE ADULT - PATIENT PORTAL LINK FT
“You can access the Follow[employer] Patient Portal, offered by [facility], by registering with the following website: [email or web address]”

## 2017-10-25 NOTE — DISCHARGE NOTE ADULT - CARE PLAN
Principal Discharge DX:	Acute cholecystitis  Goal:	Recovery  Instructions for follow-up, activity and diet:	-Continue a diet low in fat  -NO heavy lifting or more than 15 pounds and no strenuous activity for 3 weeks.-You may shower, let water and soap run over your incisions, pat dry, do not scrub.  -You may take tylenol (acetaminophen) for pain, if it is not enough you may take the percocet prescribed to you. Do not exceed 4000mg of acetaminophen in one day.  - Do not take NSAIDs such as ibuprofen, aspirin, aleeve until cleared by your doctor  -If you experience severe abdominal pain uncontrolled by pain medication, severe nausea and vomiting, chest pain, fever, please return to the Emergency room or call your physician.  -Follow up with Dr. Powell in 1-2 weeks, please call the office to schedule an appointment

## 2017-10-25 NOTE — DISCHARGE NOTE ADULT - PLAN OF CARE
Recovery -Continue a diet low in fat  -NO heavy lifting or more than 15 pounds and no strenuous activity for 3 weeks.-You may shower, let water and soap run over your incisions, pat dry, do not scrub.  -You may take tylenol (acetaminophen) for pain, if it is not enough you may take the percocet prescribed to you. Do not exceed 4000mg of acetaminophen in one day.  - Do not take NSAIDs such as ibuprofen, aspirin, aleeve until cleared by your doctor  -If you experience severe abdominal pain uncontrolled by pain medication, severe nausea and vomiting, chest pain, fever, please return to the Emergency room or call your physician.  -Follow up with Dr. Powell in 1-2 weeks, please call the office to schedule an appointment

## 2017-10-25 NOTE — PROGRESS NOTE ADULT - SUBJECTIVE AND OBJECTIVE BOX
ON: T to 100.6. Encouraged OOB, IS.   10/24: stepped down, Reg diet, switched to Bactrim based on culture sensitivities, OOB, multiple BM        82 year old female with DM, cholelithiasis presented with acute cholecystitis  now s/p laparoscopic cholecystectomy    -Pain/nausea control  -IVF  -CLD  -Bactrim  -SQH, SCD ON: T to 100.6. Encouraged OOB, IS.   10/24: stepped down, Reg diet, switched to Bactrim based on culture sensitivities, OOB, multiple BM    Patient seen and examined bedside with chief resident. +diarrhea overnight. tolerating diet.. low grade temp, IS and ambulation encouraged    trimethoprim  160 mG/sulfamethoxazole 800 mG 1 Tablet(s) Oral every 12 hours    Vital Signs Last 24 Hrs  T(C): 36.9 (25 Oct 2017 04:56), Max: 38.1 (25 Oct 2017 00:49)  T(F): 98.4 (25 Oct 2017 04:56), Max: 100.6 (25 Oct 2017 00:49)  HR: 79 (25 Oct 2017 04:56) (72 - 91)  BP: 158/77 (25 Oct 2017 04:56) (110/55 - 164/86)  BP(mean): 94 (24 Oct 2017 13:00) (86 - 94)  RR: 16 (25 Oct 2017 04:56) (15 - 17)  SpO2: 98% (25 Oct 2017 04:56) (94% - 99%)    I&O's Detail    24 Oct 2017 07:01  -  25 Oct 2017 07:00  --------------------------------------------------------  IN:    IV PiggyBack: 200 mL    Oral Fluid: 360 mL    sodium chloride 0.9%: 775 mL  Total IN: 1335 mL    OUT:    Voided: 2051 mL  Total OUT: 2051 mL    Total NET: -716 mL        PHYSICAL EXAM:  Constitutional: NAD, appears comfortable  Gastrointestinal: Abd soft, ND, sameer tender to palpation. Incisions CDI  Genitourinary: Adequate UOP  Extremities: SCDs in place        82 year old female with DM, cholelithiasis presented with acute cholecystitis  now s/p laparoscopic cholecystectomy    -Pain/nausea control  -Regular low fat diet  -Bactrim  -SQH, SCD  -ISS  -F/U am labs

## 2017-10-25 NOTE — DISCHARGE NOTE ADULT - MEDICATION SUMMARY - MEDICATIONS TO STOP TAKING
I will STOP taking the medications listed below when I get home from the hospital:    aspirin 325 mg oral delayed release tablet  -- 1 tab(s) by mouth 2 times a day for 4 weeks from date of surgery.    Aspirin Enteric Coated 325 mg oral delayed release tablet  -- 1 tab(s) by mouth 2 times a day  -- Swallow whole.  Do not crush.  Take with food or milk.

## 2017-10-25 NOTE — DISCHARGE NOTE ADULT - MEDICATION SUMMARY - MEDICATIONS TO TAKE
I will START or STAY ON the medications listed below when I get home from the hospital:    meloxicam 15 mg oral tablet  -- 1 tab(s) by mouth once a day  -- Do not take this drug if you are pregnant.  Obtain medical advice before taking any non-prescription drugs as some may affect the action of this medication.  Take with food or milk.    -- Indication: For Home Med    Percocet 5/325 oral tablet  -- 1 tab(s) by mouth every 6 hours, As Needed -for severe pain MDD:4   -- Caution federal law prohibits the transfer of this drug to any person other  than the person for whom it was prescribed.  May cause drowsiness.  Alcohol may intensify this effect.  Use care when operating dangerous machinery.  This prescription cannot be refilled.  This product contains acetaminophen.  Do not use  with any other product containing acetaminophen to prevent possible liver damage.  Using more of this medication than prescribed may cause serious breathing problems.    -- Indication: For Post-operative    docusate sodium 100 mg oral capsule  -- 1 cap(s) by mouth 3 times a day  -- Indication: For Home Med    Colace sodium 100 mg oral capsule  -- 1 cap(s) by mouth 3 times a day, As Needed  -- Medication should be taken with plenty of water.    -- Indication: For Home Med    sulfamethoxazole-trimethoprim 800 mg-160 mg oral tablet  -- 1 tab(s) by mouth every 12 hours  -- Indication: For Post-operative

## 2017-10-25 NOTE — DIETITIAN INITIAL EVALUATION ADULT. - OTHER INFO
83yo F w/ h/o DM, cholethisasis p/w acute cholecystitis -now s/p lap cholecystectomy. Currently on CSTCHO, low fat diet and tolerating PO. Consuming ~50% food. Pt reports having diarrhea 3x today. Pt unable to identify whether it was post-prandial diarrhea. Provided oral edu on purpose of low fat diet and therapeutic diet s/p cholecystectomy if diarrhea persists.  Pt was receptive and expressed understanding. NKFA or dietary restrictions. Skin: surgical incision and GI: fecal incontinence per flowsheet.

## 2017-10-27 ENCOUNTER — MOBILE ON CALL (OUTPATIENT)
Age: 82
End: 2017-10-27

## 2017-10-28 DIAGNOSIS — Z79.82 LONG TERM (CURRENT) USE OF ASPIRIN: ICD-10-CM

## 2017-10-28 DIAGNOSIS — K80.21 CALCULUS OF GALLBLADDER WITHOUT CHOLECYSTITIS WITH OBSTRUCTION: ICD-10-CM

## 2017-10-28 DIAGNOSIS — Z79.1 LONG TERM (CURRENT) USE OF NON-STEROIDAL ANTI-INFLAMMATORIES (NSAID): ICD-10-CM

## 2017-10-28 DIAGNOSIS — R10.9 UNSPECIFIED ABDOMINAL PAIN: ICD-10-CM

## 2017-10-28 DIAGNOSIS — E11.9 TYPE 2 DIABETES MELLITUS WITHOUT COMPLICATIONS: ICD-10-CM

## 2017-11-01 LAB — SURGICAL PATHOLOGY STUDY: SIGNIFICANT CHANGE UP

## 2017-11-06 DIAGNOSIS — K82.1 HYDROPS OF GALLBLADDER: ICD-10-CM

## 2017-11-06 DIAGNOSIS — D72.829 ELEVATED WHITE BLOOD CELL COUNT, UNSPECIFIED: ICD-10-CM

## 2017-11-06 DIAGNOSIS — K80.13 CALCULUS OF GALLBLADDER WITH ACUTE AND CHRONIC CHOLECYSTITIS WITH OBSTRUCTION: ICD-10-CM

## 2018-01-02 ENCOUNTER — OTHER (OUTPATIENT)
Age: 83
End: 2018-01-02

## 2018-01-09 ENCOUNTER — APPOINTMENT (OUTPATIENT)
Dept: INTERNAL MEDICINE | Facility: CLINIC | Age: 83
End: 2018-01-09

## 2018-01-12 ENCOUNTER — APPOINTMENT (OUTPATIENT)
Dept: INTERNAL MEDICINE | Facility: CLINIC | Age: 83
End: 2018-01-12
Payer: MEDICARE

## 2018-01-12 VITALS
WEIGHT: 123 LBS | HEIGHT: 60 IN | BODY MASS INDEX: 24.15 KG/M2 | TEMPERATURE: 97.5 F | SYSTOLIC BLOOD PRESSURE: 150 MMHG | OXYGEN SATURATION: 97 % | DIASTOLIC BLOOD PRESSURE: 73 MMHG | HEART RATE: 77 BPM

## 2018-01-12 DIAGNOSIS — M19.90 UNSPECIFIED OSTEOARTHRITIS, UNSPECIFIED SITE: ICD-10-CM

## 2018-01-12 DIAGNOSIS — L03.119 CELLULITIS OF UNSPECIFIED PART OF LIMB: ICD-10-CM

## 2018-01-12 DIAGNOSIS — R53.81 OTHER MALAISE: ICD-10-CM

## 2018-01-12 DIAGNOSIS — R53.83 OTHER MALAISE: ICD-10-CM

## 2018-01-12 DIAGNOSIS — L60.0 INGROWING NAIL: ICD-10-CM

## 2018-01-12 PROCEDURE — 99213 OFFICE O/P EST LOW 20 MIN: CPT

## 2018-01-16 PROBLEM — M19.90 ARTHRITIS: Status: ACTIVE | Noted: 2017-09-06

## 2018-01-16 PROBLEM — L03.119 CELLULITIS OF ANKLE: Status: RESOLVED | Noted: 2017-09-06 | Resolved: 2018-01-16

## 2018-01-16 PROBLEM — L60.0 INGROWN LEFT BIG TOENAIL: Status: RESOLVED | Noted: 2017-10-10 | Resolved: 2018-01-16

## 2018-01-16 PROBLEM — R53.81 MALAISE AND FATIGUE: Status: RESOLVED | Noted: 2017-09-06 | Resolved: 2018-01-16

## 2018-03-27 ENCOUNTER — EMERGENCY (EMERGENCY)
Facility: HOSPITAL | Age: 83
LOS: 1 days | Discharge: HOME | End: 2018-03-27
Admitting: EMERGENCY MEDICINE
Payer: MEDICARE

## 2018-03-27 VITALS
DIASTOLIC BLOOD PRESSURE: 67 MMHG | HEART RATE: 67 BPM | OXYGEN SATURATION: 98 % | HEIGHT: 60 IN | TEMPERATURE: 98 F | SYSTOLIC BLOOD PRESSURE: 179 MMHG | WEIGHT: 130.07 LBS | RESPIRATION RATE: 17 BRPM

## 2018-03-27 PROCEDURE — 99282 EMERGENCY DEPT VISIT SF MDM: CPT

## 2018-03-27 NOTE — ED ADULT NURSE NOTE - OBJECTIVE STATEMENT
Pt presents to ED with c/o foot pain, upon evaluation, patient is sleeping in the chair, appears in NAD, ambulatory, able to bear weight, no deformity noted, NVI.

## 2018-03-27 NOTE — ED PROVIDER NOTE - OBJECTIVE STATEMENT
83 y/o F presents to ED c/o painful corn to right foot x 1 year. She states she is being followed by her PMD for this and has been referred to a podiatrist however states she did not like the podiatrist office she was referred to and would rather be evaluated here. No recent change in the severity or quality of the pain. No other associated sx's or acute medical complaints at this time.

## 2018-03-27 NOTE — ED PROVIDER NOTE - SKIN RASH DESCRIPTION
tender corn to lateral plantar aspect of right foot. No swelling, erythema, warmth, streaking or discharge. NV status intact.

## 2018-03-27 NOTE — ED PROVIDER NOTE - MEDICAL DECISION MAKING DETAILS
No signs of infection. Right foot wrapped/cushioned and patient is ambulate well without difficulty. Will D/C with instructions to F/U with Podiatry in 24-48 hours.

## 2018-03-27 NOTE — ED ADULT TRIAGE NOTE - CHIEF COMPLAINT QUOTE
patient ambulatory to the ED with a limp, presents with R foot pain. Denies trauma to the area, + note of lump on the lateral aspect  on the base of  L foot

## 2018-03-31 DIAGNOSIS — E11.9 TYPE 2 DIABETES MELLITUS WITHOUT COMPLICATIONS: ICD-10-CM

## 2018-03-31 DIAGNOSIS — Z79.899 OTHER LONG TERM (CURRENT) DRUG THERAPY: ICD-10-CM

## 2018-03-31 DIAGNOSIS — M79.671 PAIN IN RIGHT FOOT: ICD-10-CM

## 2018-03-31 DIAGNOSIS — L84 CORNS AND CALLOSITIES: ICD-10-CM

## 2018-03-31 DIAGNOSIS — Z79.2 LONG TERM (CURRENT) USE OF ANTIBIOTICS: ICD-10-CM

## 2018-03-31 DIAGNOSIS — Z79.891 LONG TERM (CURRENT) USE OF OPIATE ANALGESIC: ICD-10-CM

## 2018-04-10 ENCOUNTER — EMERGENCY (EMERGENCY)
Facility: HOSPITAL | Age: 83
LOS: 1 days | Discharge: HOME | End: 2018-04-10
Admitting: EMERGENCY MEDICINE
Payer: MEDICARE

## 2018-04-10 VITALS
HEART RATE: 77 BPM | DIASTOLIC BLOOD PRESSURE: 74 MMHG | TEMPERATURE: 98 F | SYSTOLIC BLOOD PRESSURE: 191 MMHG | RESPIRATION RATE: 18 BRPM | OXYGEN SATURATION: 98 %

## 2018-04-10 DIAGNOSIS — E11.9 TYPE 2 DIABETES MELLITUS WITHOUT COMPLICATIONS: ICD-10-CM

## 2018-04-10 DIAGNOSIS — R30.0 DYSURIA: ICD-10-CM

## 2018-04-10 DIAGNOSIS — Z79.899 OTHER LONG TERM (CURRENT) DRUG THERAPY: ICD-10-CM

## 2018-04-10 DIAGNOSIS — Z79.2 LONG TERM (CURRENT) USE OF ANTIBIOTICS: ICD-10-CM

## 2018-04-10 DIAGNOSIS — N30.00 ACUTE CYSTITIS WITHOUT HEMATURIA: ICD-10-CM

## 2018-04-10 DIAGNOSIS — Z79.891 LONG TERM (CURRENT) USE OF OPIATE ANALGESIC: ICD-10-CM

## 2018-04-10 PROCEDURE — 99283 EMERGENCY DEPT VISIT LOW MDM: CPT

## 2018-04-10 RX ORDER — CEFUROXIME AXETIL 250 MG
250 TABLET ORAL EVERY 12 HOURS
Qty: 0 | Refills: 0 | Status: DISCONTINUED | OUTPATIENT
Start: 2018-04-10 | End: 2018-04-10

## 2018-04-10 RX ORDER — PHENAZOPYRIDINE HCL 100 MG
200 TABLET ORAL ONCE
Qty: 0 | Refills: 0 | Status: COMPLETED | OUTPATIENT
Start: 2018-04-10 | End: 2018-04-10

## 2018-04-10 RX ORDER — CEFUROXIME AXETIL 250 MG
1 TABLET ORAL
Qty: 20 | Refills: 0
Start: 2018-04-10 | End: 2018-04-19

## 2018-04-10 RX ADMIN — Medication 250 MILLIGRAM(S): at 18:57

## 2018-04-10 RX ADMIN — Medication 200 MILLIGRAM(S): at 18:57

## 2018-04-10 NOTE — ED PROVIDER NOTE - PHYSICAL EXAMINATION
VITAL SIGNS: I have reviewed nursing notes and confirm.  CONSTITUTIONAL: Well-developed; well-nourished; in no acute distress. Well appearing, smiling and eating when I arrive bedside. non-toxic appearing  SKIN: Skin is warm and dry, no acute rash.  HEAD: Normocephalic; atraumatic.  EYES: PERRL, EOM intact; conjunctiva and sclera clear.  ENT: No nasal discharge; airway clear.  NECK: Supple; non tender.  CARD: S1, S2 normal; no murmurs, gallops, or rubs. Regular rate and rhythm.  RESP: No wheezes, rales or rhonchi.  ABD: Normal bowel sounds; soft; non-distended; non-tender; no hepatosplenomegaly. NO CVAT BILAT  EXT: Normal ROM. No clubbing, cyanosis or edema.  NEURO: Alert, oriented. Grossly unremarkable.  PSYCH: Cooperative, appropriate.

## 2018-04-10 NOTE — ED PROVIDER NOTE - MEDICAL DECISION MAKING DETAILS
Will sned UA/U Culture. Declines blood work at this time. No other sxs than dysuria. Well appearing. Will give Ceftin - 1st dose here - Rx sent. GIven very strict return precautions. Culture sent. Will follow up with Geriatrician tomorrow AM

## 2018-04-13 ENCOUNTER — APPOINTMENT (OUTPATIENT)
Dept: INTERNAL MEDICINE | Facility: CLINIC | Age: 83
End: 2018-04-13
Payer: MEDICARE

## 2018-04-13 ENCOUNTER — LABORATORY RESULT (OUTPATIENT)
Age: 83
End: 2018-04-13

## 2018-04-13 VITALS
DIASTOLIC BLOOD PRESSURE: 68 MMHG | WEIGHT: 125 LBS | HEIGHT: 60 IN | OXYGEN SATURATION: 99 % | HEART RATE: 67 BPM | SYSTOLIC BLOOD PRESSURE: 160 MMHG | TEMPERATURE: 98 F | BODY MASS INDEX: 24.54 KG/M2

## 2018-04-13 DIAGNOSIS — N30.00 ACUTE CYSTITIS W/OUT HEMATURIA: ICD-10-CM

## 2018-04-13 DIAGNOSIS — S90.821A BLISTER (NONTHERMAL), RIGHT FOOT, INITIAL ENCOUNTER: ICD-10-CM

## 2018-04-13 PROCEDURE — 36415 COLL VENOUS BLD VENIPUNCTURE: CPT

## 2018-04-13 PROCEDURE — 99214 OFFICE O/P EST MOD 30 MIN: CPT | Mod: 25

## 2018-04-16 ENCOUNTER — MESSAGE (OUTPATIENT)
Age: 83
End: 2018-04-16

## 2018-04-16 LAB
ALBUMIN SERPL ELPH-MCNC: 4.3 G/DL
ALP BLD-CCNC: 81 U/L
ALT SERPL-CCNC: 13 U/L
ANION GAP SERPL CALC-SCNC: 11 MMOL/L
APPEARANCE: CLEAR
AST SERPL-CCNC: 15 U/L
BASOPHILS # BLD AUTO: 0.02 K/UL
BASOPHILS NFR BLD AUTO: 0.3 %
BILIRUB SERPL-MCNC: 0.4 MG/DL
BILIRUBIN URINE: NEGATIVE
BLOOD URINE: NEGATIVE
BUN SERPL-MCNC: 18 MG/DL
CALCIUM SERPL-MCNC: 9.5 MG/DL
CHLORIDE SERPL-SCNC: 102 MMOL/L
CHOLEST SERPL-MCNC: 175 MG/DL
CHOLEST/HDLC SERPL: 2.2 RATIO
CO2 SERPL-SCNC: 26 MMOL/L
COLOR: YELLOW
CREAT SERPL-MCNC: 0.81 MG/DL
CREAT SPEC-SCNC: 59 MG/DL
EOSINOPHIL # BLD AUTO: 0.15 K/UL
EOSINOPHIL NFR BLD AUTO: 2 %
GLUCOSE QUALITATIVE U: NEGATIVE MG/DL
GLUCOSE SERPL-MCNC: 154 MG/DL
HBA1C MFR BLD HPLC: 7.7 %
HCT VFR BLD CALC: 36.8 %
HDLC SERPL-MCNC: 79 MG/DL
HGB BLD-MCNC: 11.8 G/DL
IMM GRANULOCYTES NFR BLD AUTO: 0.3 %
KETONES URINE: NEGATIVE
LDLC SERPL CALC-MCNC: 85 MG/DL
LEUKOCYTE ESTERASE URINE: ABNORMAL
LYMPHOCYTES # BLD AUTO: 2 K/UL
LYMPHOCYTES NFR BLD AUTO: 26.4 %
MAN DIFF?: NORMAL
MCHC RBC-ENTMCNC: 27.9 PG
MCHC RBC-ENTMCNC: 32.1 GM/DL
MCV RBC AUTO: 87 FL
MICROALBUMIN 24H UR DL<=1MG/L-MCNC: 0.4 MG/DL
MICROALBUMIN/CREAT 24H UR-RTO: 7 MG/G
MONOCYTES # BLD AUTO: 0.33 K/UL
MONOCYTES NFR BLD AUTO: 4.4 %
NEUTROPHILS # BLD AUTO: 5.05 K/UL
NEUTROPHILS NFR BLD AUTO: 66.6 %
NITRITE URINE: NEGATIVE
PH URINE: 7
PLATELET # BLD AUTO: 264 K/UL
POTASSIUM SERPL-SCNC: 4.5 MMOL/L
PROT SERPL-MCNC: 6.7 G/DL
PROTEIN URINE: NEGATIVE MG/DL
RBC # BLD: 4.23 M/UL
RBC # FLD: 16 %
SODIUM SERPL-SCNC: 139 MMOL/L
SPECIFIC GRAVITY URINE: 1.01
TRIGL SERPL-MCNC: 54 MG/DL
TSH SERPL-ACNC: 2.11 UIU/ML
UROBILINOGEN URINE: NEGATIVE MG/DL
WBC # FLD AUTO: 7.57 K/UL

## 2018-04-29 ENCOUNTER — EMERGENCY (EMERGENCY)
Facility: HOSPITAL | Age: 83
LOS: 1 days | Discharge: HOME | End: 2018-04-29
Attending: EMERGENCY MEDICINE | Admitting: EMERGENCY MEDICINE
Payer: MEDICARE

## 2018-04-29 VITALS
SYSTOLIC BLOOD PRESSURE: 180 MMHG | RESPIRATION RATE: 20 BRPM | TEMPERATURE: 98 F | HEART RATE: 88 BPM | DIASTOLIC BLOOD PRESSURE: 75 MMHG | OXYGEN SATURATION: 99 %

## 2018-04-29 DIAGNOSIS — R10.2 PELVIC AND PERINEAL PAIN: ICD-10-CM

## 2018-04-29 DIAGNOSIS — N39.0 URINARY TRACT INFECTION, SITE NOT SPECIFIED: ICD-10-CM

## 2018-04-29 DIAGNOSIS — Z79.891 LONG TERM (CURRENT) USE OF OPIATE ANALGESIC: ICD-10-CM

## 2018-04-29 DIAGNOSIS — Z79.899 OTHER LONG TERM (CURRENT) DRUG THERAPY: ICD-10-CM

## 2018-04-29 LAB
APPEARANCE UR: SIGNIFICANT CHANGE UP
BILIRUB UR-MCNC: NEGATIVE — SIGNIFICANT CHANGE UP
COLOR SPEC: YELLOW — SIGNIFICANT CHANGE UP
DIFF PNL FLD: (no result)
GLUCOSE UR QL: NEGATIVE — SIGNIFICANT CHANGE UP
KETONES UR-MCNC: NEGATIVE — SIGNIFICANT CHANGE UP
LEUKOCYTE ESTERASE UR-ACNC: (no result)
NITRITE UR-MCNC: POSITIVE
PH UR: 5.5 — SIGNIFICANT CHANGE UP (ref 5–8)
PROT UR-MCNC: 100 MG/DL
SP GR SPEC: >=1.03 — SIGNIFICANT CHANGE UP (ref 1–1.03)
UROBILINOGEN FLD QL: 0.2 E.U./DL — SIGNIFICANT CHANGE UP

## 2018-04-29 PROCEDURE — 99283 EMERGENCY DEPT VISIT LOW MDM: CPT

## 2018-04-29 RX ORDER — PHENAZOPYRIDINE HCL 100 MG
200 TABLET ORAL ONCE
Qty: 0 | Refills: 0 | Status: COMPLETED | OUTPATIENT
Start: 2018-04-29 | End: 2018-04-29

## 2018-04-29 RX ORDER — PHENAZOPYRIDINE HCL 100 MG
1 TABLET ORAL
Qty: 4 | Refills: 0
Start: 2018-04-29 | End: 2018-04-30

## 2018-04-29 RX ORDER — CEPHALEXIN 500 MG
1 CAPSULE ORAL
Qty: 21 | Refills: 0
Start: 2018-04-29 | End: 2018-05-05

## 2018-04-29 RX ORDER — CEPHALEXIN 500 MG
500 CAPSULE ORAL ONCE
Qty: 0 | Refills: 0 | Status: COMPLETED | OUTPATIENT
Start: 2018-04-29 | End: 2018-04-29

## 2018-04-29 RX ADMIN — Medication 200 MILLIGRAM(S): at 22:17

## 2018-04-29 RX ADMIN — Medication 500 MILLIGRAM(S): at 23:17

## 2018-04-29 NOTE — ED PROVIDER NOTE - PHYSICAL EXAMINATION
CON: ao x 3, HENMT: clear oropharynx, soft neck, HEAD: atraumatic, GI: +BS, soft, nontender, no rebound, no guarding, : no CVAT, MSK: no deformities, NEURO: no gross motor or sensory deficit

## 2018-04-29 NOTE — ED ADULT NURSE NOTE - CHPI ED SYMPTOMS NEG
no chills/no blood in stool/no diarrhea/no fever/no vomiting/no abdominal distension/no nausea/no hematuria

## 2018-04-29 NOTE — ED PROVIDER NOTE - OBJECTIVE STATEMENT
83 yof pw urinary pelvic pressure, no bleeding, no fc, no discharge, no back pain, no nvd, no other complaints.  feels similar to past UTI.

## 2018-04-29 NOTE — ED ADULT NURSE NOTE - OBJECTIVE STATEMENT
Pt presents to ED with c/o urinary symptoms, pt seen here recently for same. Pt afebrile, denies flank pain

## 2018-04-29 NOTE — ED PROVIDER NOTE - MEDICAL DECISION MAKING DETAILS
well appearing pt w/ pressure w/ urination w/o bleeding or discharge, no nvd, no cvat, likely cystitis, UA/culture, pyridium, abx pending UA

## 2018-04-29 NOTE — ED ADULT TRIAGE NOTE - CHIEF COMPLAINT QUOTE
walkin patient with complaints of urinary symptoms. Recent dx and tx for UTI. Reports continued suprapubic pain/pressure, scanty urine and frequency. denies hematuria.

## 2018-05-01 LAB
CULTURE RESULTS: SIGNIFICANT CHANGE UP
SPECIMEN SOURCE: SIGNIFICANT CHANGE UP

## 2018-05-04 ENCOUNTER — MESSAGE (OUTPATIENT)
Age: 83
End: 2018-05-04

## 2018-07-23 PROBLEM — Z78.9 ALCOHOL USE: Status: ACTIVE | Noted: 2017-09-06

## 2018-08-15 NOTE — PHYSICAL THERAPY INITIAL EVALUATION ADULT - ASR WT BEARING STATUS EVAL
Subjective:      Patient ID: Aniceto Warner is a 81 y.o. female. HPI  S/P subtotal cholecystectomy for acute cholecystitis. Doing very well. No pain and eating well. Ready to return to work    Review of Systems non contributory    Objective:   Physical Exam  Soft flat abdomen. No RUQ tenderness  Assessment:      Post subtotal cholecystectomy, doing well. Work release      Plan:      Return as needed.         Dominique Cobb MD
no weight-bearing restrictions

## 2018-08-17 ENCOUNTER — APPOINTMENT (OUTPATIENT)
Dept: INTERNAL MEDICINE | Facility: CLINIC | Age: 83
End: 2018-08-17

## 2018-08-17 ENCOUNTER — APPOINTMENT (OUTPATIENT)
Dept: INTERNAL MEDICINE | Facility: CLINIC | Age: 83
End: 2018-08-17
Payer: MEDICARE

## 2018-08-17 VITALS
HEIGHT: 60 IN | TEMPERATURE: 97.6 F | BODY MASS INDEX: 24.35 KG/M2 | HEART RATE: 69 BPM | DIASTOLIC BLOOD PRESSURE: 72 MMHG | SYSTOLIC BLOOD PRESSURE: 170 MMHG | WEIGHT: 124 LBS | OXYGEN SATURATION: 98 %

## 2018-08-17 PROCEDURE — 36415 COLL VENOUS BLD VENIPUNCTURE: CPT

## 2018-08-17 PROCEDURE — 99214 OFFICE O/P EST MOD 30 MIN: CPT | Mod: 25

## 2018-08-17 RX ORDER — OXYCODONE AND ACETAMINOPHEN 5; 325 MG/1; MG/1
5-325 TABLET ORAL
Qty: 60 | Refills: 0 | Status: DISCONTINUED | COMMUNITY
Start: 2017-07-18 | End: 2018-08-17

## 2018-08-20 LAB
ALBUMIN SERPL ELPH-MCNC: 4.4 G/DL
ALP BLD-CCNC: 82 U/L
ALT SERPL-CCNC: 9 U/L
ANION GAP SERPL CALC-SCNC: 14 MMOL/L
AST SERPL-CCNC: 15 U/L
BILIRUB SERPL-MCNC: 0.6 MG/DL
BUN SERPL-MCNC: 14 MG/DL
CALCIUM SERPL-MCNC: 9.7 MG/DL
CHLORIDE SERPL-SCNC: 101 MMOL/L
CO2 SERPL-SCNC: 26 MMOL/L
CREAT SERPL-MCNC: 0.73 MG/DL
GLUCOSE SERPL-MCNC: 161 MG/DL
HBA1C MFR BLD HPLC: 7.9 %
POTASSIUM SERPL-SCNC: 4.8 MMOL/L
PROT SERPL-MCNC: 6.6 G/DL
SODIUM SERPL-SCNC: 141 MMOL/L

## 2018-12-14 ENCOUNTER — APPOINTMENT (OUTPATIENT)
Dept: INTERNAL MEDICINE | Facility: CLINIC | Age: 83
End: 2018-12-14
Payer: MEDICARE

## 2018-12-14 VITALS
HEART RATE: 72 BPM | DIASTOLIC BLOOD PRESSURE: 82 MMHG | SYSTOLIC BLOOD PRESSURE: 150 MMHG | BODY MASS INDEX: 24.8 KG/M2 | OXYGEN SATURATION: 96 % | TEMPERATURE: 97.7 F | WEIGHT: 127 LBS

## 2018-12-14 PROCEDURE — 99214 OFFICE O/P EST MOD 30 MIN: CPT | Mod: 25

## 2018-12-14 PROCEDURE — 36415 COLL VENOUS BLD VENIPUNCTURE: CPT

## 2018-12-17 LAB
25(OH)D3 SERPL-MCNC: 19.6 NG/ML
ALBUMIN SERPL ELPH-MCNC: 4.5 G/DL
ALP BLD-CCNC: 84 U/L
ALT SERPL-CCNC: 12 U/L
ANION GAP SERPL CALC-SCNC: 11 MMOL/L
AST SERPL-CCNC: 16 U/L
BILIRUB SERPL-MCNC: 0.3 MG/DL
BUN SERPL-MCNC: 13 MG/DL
CALCIUM SERPL-MCNC: 9.5 MG/DL
CHLORIDE SERPL-SCNC: 105 MMOL/L
CO2 SERPL-SCNC: 27 MMOL/L
CREAT SERPL-MCNC: 0.78 MG/DL
GLUCOSE SERPL-MCNC: 123 MG/DL
HBA1C MFR BLD HPLC: 7.3 %
POTASSIUM SERPL-SCNC: 4.8 MMOL/L
PROT SERPL-MCNC: 6.9 G/DL
SODIUM SERPL-SCNC: 143 MMOL/L

## 2019-01-30 NOTE — ED ADULT NURSE NOTE - CAS DISCH BELONGINGS RETURNED
Island Pedicle Flap With Canthal Suspension Text: The defect edges were debeveled with a #15 scalpel blade.  Given the location of the defect, shape of the defect and the proximity to free margins an island pedicle advancement flap was deemed most appropriate.  Using a sterile surgical marker, an appropriate advancement flap was drawn incorporating the defect, outlining the appropriate donor tissue and placing the expected incisions within the relaxed skin tension lines where possible. The area thus outlined was incised deep to adipose tissue with a #15 scalpel blade.  The skin margins were undermined to an appropriate distance in all directions around the primary defect and laterally outward around the island pedicle utilizing iris scissors.  There was minimal undermining beneath the pedicle flap. A suspension suture was placed in the canthal tendon to prevent tension and prevent ectropion. Yes

## 2019-02-12 ENCOUNTER — RX RENEWAL (OUTPATIENT)
Age: 84
End: 2019-02-12

## 2019-03-14 PROBLEM — L98.9 SKIN LESION OF FACE: Status: ACTIVE | Noted: 2018-12-14

## 2019-03-14 PROBLEM — Z23 ENCOUNTER FOR IMMUNIZATION: Status: ACTIVE | Noted: 2019-03-14

## 2019-03-15 ENCOUNTER — APPOINTMENT (OUTPATIENT)
Dept: INTERNAL MEDICINE | Facility: CLINIC | Age: 84
End: 2019-03-15
Payer: MEDICARE

## 2019-03-15 ENCOUNTER — LABORATORY RESULT (OUTPATIENT)
Age: 84
End: 2019-03-15

## 2019-03-15 ENCOUNTER — NON-APPOINTMENT (OUTPATIENT)
Age: 84
End: 2019-03-15

## 2019-03-15 VITALS
HEIGHT: 60 IN | TEMPERATURE: 97.9 F | WEIGHT: 124 LBS | SYSTOLIC BLOOD PRESSURE: 148 MMHG | DIASTOLIC BLOOD PRESSURE: 83 MMHG | OXYGEN SATURATION: 98 % | BODY MASS INDEX: 24.35 KG/M2 | HEART RATE: 66 BPM

## 2019-03-15 DIAGNOSIS — Z23 ENCOUNTER FOR IMMUNIZATION: ICD-10-CM

## 2019-03-15 DIAGNOSIS — L98.9 DISORDER OF THE SKIN AND SUBCUTANEOUS TISSUE, UNSPECIFIED: ICD-10-CM

## 2019-03-15 PROCEDURE — 93000 ELECTROCARDIOGRAM COMPLETE: CPT

## 2019-03-15 PROCEDURE — 36415 COLL VENOUS BLD VENIPUNCTURE: CPT

## 2019-03-15 PROCEDURE — 99215 OFFICE O/P EST HI 40 MIN: CPT | Mod: 25

## 2019-03-18 LAB
25(OH)D3 SERPL-MCNC: 20.9 NG/ML
ALBUMIN SERPL ELPH-MCNC: 4.3 G/DL
ALP BLD-CCNC: 72 U/L
ALT SERPL-CCNC: 8 U/L
ANION GAP SERPL CALC-SCNC: 17 MMOL/L
APPEARANCE: CLEAR
AST SERPL-CCNC: 14 U/L
BILIRUB SERPL-MCNC: 0.2 MG/DL
BILIRUBIN URINE: NEGATIVE
BLOOD URINE: NEGATIVE
BUN SERPL-MCNC: 15 MG/DL
CALCIUM SERPL-MCNC: 9.5 MG/DL
CHLORIDE SERPL-SCNC: 103 MMOL/L
CO2 SERPL-SCNC: 21 MMOL/L
COLOR: YELLOW
CREAT SERPL-MCNC: 0.66 MG/DL
GLUCOSE QUALITATIVE U: NEGATIVE
GLUCOSE SERPL-MCNC: 136 MG/DL
HBA1C MFR BLD HPLC: 6.6 %
KETONES URINE: NEGATIVE
LEUKOCYTE ESTERASE URINE: ABNORMAL
NITRITE URINE: NEGATIVE
PH URINE: 6.5
POTASSIUM SERPL-SCNC: 4.6 MMOL/L
PROT SERPL-MCNC: 6.7 G/DL
PROTEIN URINE: NEGATIVE
SODIUM SERPL-SCNC: 141 MMOL/L
SPECIFIC GRAVITY URINE: 1.02
UROBILINOGEN URINE: NORMAL

## 2019-03-25 ENCOUNTER — LABORATORY RESULT (OUTPATIENT)
Age: 84
End: 2019-03-25

## 2019-03-25 ENCOUNTER — APPOINTMENT (OUTPATIENT)
Dept: DERMATOLOGY | Facility: CLINIC | Age: 84
End: 2019-03-25
Payer: MEDICARE

## 2019-03-25 VITALS — DIASTOLIC BLOOD PRESSURE: 81 MMHG | SYSTOLIC BLOOD PRESSURE: 165 MMHG

## 2019-03-25 DIAGNOSIS — Z91.89 OTHER SPECIFIED PERSONAL RISK FACTORS, NOT ELSEWHERE CLASSIFIED: ICD-10-CM

## 2019-03-25 DIAGNOSIS — Z85.828 PERSONAL HISTORY OF OTHER MALIGNANT NEOPLASM OF SKIN: ICD-10-CM

## 2019-03-25 PROCEDURE — 17000 DESTRUCT PREMALG LESION: CPT | Mod: 59

## 2019-03-25 PROCEDURE — 17003 DESTRUCT PREMALG LES 2-14: CPT

## 2019-03-25 PROCEDURE — 11102 TANGNTL BX SKIN SINGLE LES: CPT

## 2019-03-25 PROCEDURE — 99203 OFFICE O/P NEW LOW 30 MIN: CPT | Mod: 25

## 2019-03-29 ENCOUNTER — MOBILE ON CALL (OUTPATIENT)
Age: 84
End: 2019-03-29

## 2019-04-05 ENCOUNTER — APPOINTMENT (OUTPATIENT)
Dept: INTERNAL MEDICINE | Facility: CLINIC | Age: 84
End: 2019-04-05
Payer: MEDICARE

## 2019-04-05 VITALS
DIASTOLIC BLOOD PRESSURE: 68 MMHG | OXYGEN SATURATION: 98 % | BODY MASS INDEX: 24.22 KG/M2 | HEART RATE: 68 BPM | TEMPERATURE: 98 F | WEIGHT: 124 LBS | SYSTOLIC BLOOD PRESSURE: 140 MMHG

## 2019-04-05 DIAGNOSIS — C44.319 BASAL CELL CARCINOMA OF SKIN OF OTHER PARTS OF FACE: ICD-10-CM

## 2019-04-05 PROCEDURE — 99214 OFFICE O/P EST MOD 30 MIN: CPT

## 2019-04-29 ENCOUNTER — LABORATORY RESULT (OUTPATIENT)
Age: 84
End: 2019-04-29

## 2019-04-30 ENCOUNTER — APPOINTMENT (OUTPATIENT)
Dept: DERMATOLOGY | Facility: CLINIC | Age: 84
End: 2019-04-30
Payer: MEDICARE

## 2019-04-30 DIAGNOSIS — D48.9 NEOPLASM OF UNCERTAIN BEHAVIOR, UNSPECIFIED: ICD-10-CM

## 2019-04-30 DIAGNOSIS — Z12.83 ENCOUNTER FOR SCREENING FOR MALIGNANT NEOPLASM OF SKIN: ICD-10-CM

## 2019-04-30 PROCEDURE — 11102 TANGNTL BX SKIN SINGLE LES: CPT

## 2019-04-30 PROCEDURE — 17003 DESTRUCT PREMALG LES 2-14: CPT

## 2019-04-30 PROCEDURE — 17000 DESTRUCT PREMALG LESION: CPT | Mod: 59

## 2019-04-30 PROCEDURE — 99214 OFFICE O/P EST MOD 30 MIN: CPT | Mod: 25

## 2019-05-25 ENCOUNTER — EMERGENCY (EMERGENCY)
Facility: HOSPITAL | Age: 84
LOS: 1 days | Discharge: HOME | End: 2019-05-25
Attending: EMERGENCY MEDICINE | Admitting: EMERGENCY MEDICINE
Payer: MEDICARE

## 2019-05-25 VITALS
SYSTOLIC BLOOD PRESSURE: 184 MMHG | HEART RATE: 77 BPM | OXYGEN SATURATION: 98 % | DIASTOLIC BLOOD PRESSURE: 72 MMHG | RESPIRATION RATE: 16 BRPM | TEMPERATURE: 98 F

## 2019-05-25 DIAGNOSIS — Z79.2 LONG TERM (CURRENT) USE OF ANTIBIOTICS: ICD-10-CM

## 2019-05-25 DIAGNOSIS — R35.0 FREQUENCY OF MICTURITION: ICD-10-CM

## 2019-05-25 DIAGNOSIS — N39.0 URINARY TRACT INFECTION, SITE NOT SPECIFIED: ICD-10-CM

## 2019-05-25 DIAGNOSIS — Z79.891 LONG TERM (CURRENT) USE OF OPIATE ANALGESIC: ICD-10-CM

## 2019-05-25 DIAGNOSIS — Z79.1 LONG TERM (CURRENT) USE OF NON-STEROIDAL ANTI-INFLAMMATORIES (NSAID): ICD-10-CM

## 2019-05-25 DIAGNOSIS — Z79.899 OTHER LONG TERM (CURRENT) DRUG THERAPY: ICD-10-CM

## 2019-05-25 LAB
APPEARANCE UR: ABNORMAL
BILIRUB UR-MCNC: NEGATIVE — SIGNIFICANT CHANGE UP
COLOR SPEC: YELLOW — SIGNIFICANT CHANGE UP
DIFF PNL FLD: ABNORMAL
GLUCOSE UR QL: NEGATIVE — SIGNIFICANT CHANGE UP
KETONES UR-MCNC: NEGATIVE — SIGNIFICANT CHANGE UP
LEUKOCYTE ESTERASE UR-ACNC: ABNORMAL
NITRITE UR-MCNC: NEGATIVE — SIGNIFICANT CHANGE UP
PH UR: 7 — SIGNIFICANT CHANGE UP (ref 5–8)
PROT UR-MCNC: ABNORMAL MG/DL
SP GR SPEC: 1.02 — SIGNIFICANT CHANGE UP (ref 1–1.03)
UROBILINOGEN FLD QL: 0.2 E.U./DL — SIGNIFICANT CHANGE UP

## 2019-05-25 PROCEDURE — 99283 EMERGENCY DEPT VISIT LOW MDM: CPT

## 2019-05-25 RX ORDER — PHENAZOPYRIDINE HCL 100 MG
1 TABLET ORAL
Qty: 6 | Refills: 0
Start: 2019-05-25 | End: 2019-05-26

## 2019-05-25 RX ORDER — IBUPROFEN 200 MG
600 TABLET ORAL ONCE
Refills: 0 | Status: COMPLETED | OUTPATIENT
Start: 2019-05-25 | End: 2019-05-25

## 2019-05-25 RX ORDER — PHENAZOPYRIDINE HCL 100 MG
200 TABLET ORAL ONCE
Refills: 0 | Status: COMPLETED | OUTPATIENT
Start: 2019-05-25 | End: 2019-05-25

## 2019-05-25 RX ORDER — IBUPROFEN 200 MG
1 TABLET ORAL
Qty: 20 | Refills: 0
Start: 2019-05-25 | End: 2019-05-29

## 2019-05-25 RX ORDER — CEPHALEXIN 500 MG
500 CAPSULE ORAL EVERY 12 HOURS
Refills: 0 | Status: DISCONTINUED | OUTPATIENT
Start: 2019-05-25 | End: 2019-05-29

## 2019-05-25 RX ORDER — CEPHALEXIN 500 MG
1 CAPSULE ORAL
Qty: 28 | Refills: 0
Start: 2019-05-25 | End: 2019-05-31

## 2019-05-25 RX ADMIN — Medication 600 MILLIGRAM(S): at 22:25

## 2019-05-25 RX ADMIN — Medication 200 MILLIGRAM(S): at 22:27

## 2019-05-25 RX ADMIN — Medication 500 MILLIGRAM(S): at 22:25

## 2019-05-25 NOTE — ED PROVIDER NOTE - CLINICAL SUMMARY MEDICAL DECISION MAKING FREE TEXT BOX
Pyridium, start on Keflex. Pt appears non-toxic and afebrile, tolerating PO. Return precautions given.

## 2019-05-25 NOTE — ED PROVIDER NOTE - NSFOLLOWUPINSTRUCTIONS_ED_ALL_ED_FT
Please return if you develop high fevers, worsening pain, despite the medicaitons given. return if you start to vomit and hold down your medications. follow up with your primary care physician in one week. return to the ED prior to this if you develop worsening symptoms.

## 2019-05-25 NOTE — ED PROVIDER NOTE - OBJECTIVE STATEMENT
85 y/o F with PMHx of diabetes presents with 1 day of urinary frequency, mild dysuria and urinary hesitancy. Denies fevers, chills, nausea, vomiting or flank pain. Has had UTIs in the past that feel similar.

## 2019-05-25 NOTE — ED ADULT NURSE NOTE - OBJECTIVE STATEMENT
"I have a UTI." Complains of urinary frequency and pressure when urinating.  denies fever chills or blood in urine

## 2019-05-25 NOTE — ED ADULT NURSE NOTE - NSSUSCREENINGQ3_ED_ALL_ED
EGD  Pre-Procedure Instructions      Patient Name: Na Cordova  Procedure Date:  March 30, 2017  Procedure Time:  9:30 a.m.  Arrival Time: 8:45 a.m.  Physician:  Dr. Quinton Jolly Jr.  Location:  [address], [address]  [place] [address]  [phone]  Please enter through the Physician's Office [place]        Please read these instructions thoroughly. If you have any questions, please call the physician's office at [phone]    TRANSPORTATION:  A responsible adult must drive you home after the procedure.  The medication given during the procedure may cause drowsiness, making it unsafe for you to drive or operate machinery.  A taxi is not acceptable transportation.  Please make arrangements in advance or we will not be able to do your procedure.    Please make arrangements for someone to stay with you or check in on you frequently the rest of the day/evening until the drowsiness has completely worn off.      CANCELLATION AND RESCHEDULE POLICY:  In the event that you need to cancel or reschedule your procedure, please call our schedulers:  [place]: [phone]    ADDITIONAL INSTRUCTIONS:                                     SPECIAL CONDITIONS:  Contact your primary care physician if you have diabetes and take insulin. You may need to have your insulin dose adjusted the day before and the day of the procedure.    Do not drink any alcoholic beverages for at least 24 hours before the procedure.      FIVE (5) DAYS BEFORE THE PROCEDURE:  Do not take iron supplements or Pepto-Bismol. These products may make it more difficult for the physician to see the inside of the stomach.    Do not eat products with [place] (a fat substitute found in [employer] and [employer] Fat-Free chips)    It is important to continue to take all other prescribed medications. On the day of the procedure, please take your blood pressure medications with a sip of water.    Do not take any blood  thinning or platelet-modifying medications (such as Coumadin (warfarin), Plavix (clopidogrel), Aggrenox, Ticlid, etc.) unless otherwise directed.      DIET:  You may follow a normal diet up until midnight prior to your procedure.    Sips of water after midnight unless directed not to.            DAY OF THE PROCEDURE:  You may take your medications the morning of your procedure with sips of water.    Arrive for your procedure 45 minutes prior to your scheduled time.    You will receive sedation during the procedure: please make sure you have someone to drive you that day.    Do not plan on going to work or doing anything after the procedure, as you will be drowsy most of the afternoon.    AFTER THE PROCEDURE:  You may resume your regular diet. Start slow with small amounts and increase as you see how well you do.    Please call the physician's office that did the procedure if you have further questions or if you need additional care.      Check with the Doctor as to when you can resume your medications after the procedure.    RESULTS:  If tissue samples were take during your procedure, your results will take approximately 10 working days to return.     If you have not heard from the Doctor's office please call.            No

## 2019-05-25 NOTE — ED ADULT NURSE NOTE - NSIMPLEMENTINTERV_GEN_ALL_ED
Implemented All Universal Safety Interventions:  [place] to call system. Call bell, personal items and telephone within reach. Instruct patient to call for assistance. Room bathroom lighting operational. Non-slip footwear when patient is off stretcher. Physically safe environment: no spills, clutter or unnecessary equipment. Stretcher in lowest position, wheels locked, appropriate side rails in place.

## 2019-06-10 ENCOUNTER — APPOINTMENT (OUTPATIENT)
Dept: DERMATOLOGY | Facility: CLINIC | Age: 84
End: 2019-06-10
Payer: MEDICARE

## 2019-06-10 DIAGNOSIS — C44.99 OTHER SPECIFIED MALIGNANT NEOPLASM OF SKIN, UNSPECIFIED: ICD-10-CM

## 2019-06-10 PROCEDURE — 99214 OFFICE O/P EST MOD 30 MIN: CPT

## 2019-08-12 ENCOUNTER — APPOINTMENT (OUTPATIENT)
Dept: INTERNAL MEDICINE | Facility: CLINIC | Age: 84
End: 2019-08-12
Payer: MEDICARE

## 2019-08-12 VITALS
DIASTOLIC BLOOD PRESSURE: 60 MMHG | SYSTOLIC BLOOD PRESSURE: 140 MMHG | OXYGEN SATURATION: 98 % | TEMPERATURE: 98.3 F | HEART RATE: 68 BPM | WEIGHT: 123 LBS | BODY MASS INDEX: 24.02 KG/M2

## 2019-08-12 DIAGNOSIS — C44.310 BASAL CELL CARCINOMA OF SKIN OF UNSPECIFIED PARTS OF FACE: ICD-10-CM

## 2019-08-12 PROCEDURE — 36415 COLL VENOUS BLD VENIPUNCTURE: CPT

## 2019-08-12 PROCEDURE — 99214 OFFICE O/P EST MOD 30 MIN: CPT | Mod: 25

## 2019-08-13 LAB
ALBUMIN SERPL ELPH-MCNC: 4.2 G/DL
ALP BLD-CCNC: 77 U/L
ALT SERPL-CCNC: 12 U/L
ANION GAP SERPL CALC-SCNC: 12 MMOL/L
AST SERPL-CCNC: 15 U/L
BILIRUB SERPL-MCNC: 0.3 MG/DL
BUN SERPL-MCNC: 14 MG/DL
CALCIUM SERPL-MCNC: 9.3 MG/DL
CHLORIDE SERPL-SCNC: 103 MMOL/L
CO2 SERPL-SCNC: 26 MMOL/L
CREAT SERPL-MCNC: 0.75 MG/DL
ESTIMATED AVERAGE GLUCOSE: 151 MG/DL
GLUCOSE SERPL-MCNC: 123 MG/DL
HBA1C MFR BLD HPLC: 6.9 %
POTASSIUM SERPL-SCNC: 5.2 MMOL/L
PROT SERPL-MCNC: 6.5 G/DL
SODIUM SERPL-SCNC: 141 MMOL/L

## 2019-08-23 DIAGNOSIS — Z63.4 DISAPPEARANCE AND DEATH OF FAMILY MEMBER: ICD-10-CM

## 2019-08-23 DIAGNOSIS — Z60.2 PROBLEMS RELATED TO LIVING ALONE: ICD-10-CM

## 2019-08-23 SDOH — SOCIAL STABILITY - SOCIAL INSECURITY: DISSAPEARANCE AND DEATH OF FAMILY MEMBER: Z63.4

## 2019-08-23 SDOH — SOCIAL STABILITY - SOCIAL INSECURITY: PROBLEMS RELATED TO LIVING ALONE: Z60.2

## 2019-08-29 PROBLEM — Z60.2 LIVING ALONE: Status: ACTIVE | Noted: 2019-08-29

## 2019-08-29 PROBLEM — Z63.4 WIDOWED: Status: ACTIVE | Noted: 2019-08-29

## 2019-11-11 ENCOUNTER — APPOINTMENT (OUTPATIENT)
Dept: INTERNAL MEDICINE | Facility: CLINIC | Age: 84
End: 2019-11-11
Payer: MEDICARE

## 2019-11-11 VITALS
OXYGEN SATURATION: 98 % | BODY MASS INDEX: 23.56 KG/M2 | HEART RATE: 70 BPM | WEIGHT: 120 LBS | HEIGHT: 60 IN | TEMPERATURE: 97.6 F | SYSTOLIC BLOOD PRESSURE: 130 MMHG | DIASTOLIC BLOOD PRESSURE: 66 MMHG

## 2019-11-11 PROCEDURE — 36415 COLL VENOUS BLD VENIPUNCTURE: CPT

## 2019-11-11 PROCEDURE — 99213 OFFICE O/P EST LOW 20 MIN: CPT | Mod: 25

## 2019-11-12 LAB
25(OH)D3 SERPL-MCNC: 49.4 NG/ML
ALBUMIN SERPL ELPH-MCNC: 4.3 G/DL
ALP BLD-CCNC: 72 U/L
ALT SERPL-CCNC: 7 U/L
ANION GAP SERPL CALC-SCNC: 12 MMOL/L
AST SERPL-CCNC: 14 U/L
BILIRUB SERPL-MCNC: 0.4 MG/DL
BUN SERPL-MCNC: 12 MG/DL
CALCIUM SERPL-MCNC: 9.5 MG/DL
CHLORIDE SERPL-SCNC: 104 MMOL/L
CO2 SERPL-SCNC: 24 MMOL/L
CREAT SERPL-MCNC: 0.7 MG/DL
ESTIMATED AVERAGE GLUCOSE: 143 MG/DL
GLUCOSE SERPL-MCNC: 180 MG/DL
HBA1C MFR BLD HPLC: 6.6 %
POTASSIUM SERPL-SCNC: 4.5 MMOL/L
PROT SERPL-MCNC: 6.4 G/DL
SODIUM SERPL-SCNC: 140 MMOL/L

## 2020-03-16 ENCOUNTER — APPOINTMENT (OUTPATIENT)
Dept: INTERNAL MEDICINE | Facility: CLINIC | Age: 85
End: 2020-03-16

## 2020-05-29 ENCOUNTER — TRANSCRIPTION ENCOUNTER (OUTPATIENT)
Age: 85
End: 2020-05-29

## 2020-05-29 VITALS
HEART RATE: 99 BPM | DIASTOLIC BLOOD PRESSURE: 70 MMHG | TEMPERATURE: 98 F | WEIGHT: 125 LBS | OXYGEN SATURATION: 97 % | HEIGHT: 60 IN | SYSTOLIC BLOOD PRESSURE: 188 MMHG | RESPIRATION RATE: 18 BRPM

## 2020-05-29 LAB
ALBUMIN SERPL ELPH-MCNC: 3.5 G/DL — SIGNIFICANT CHANGE UP (ref 3.4–5)
ALBUMIN SERPL ELPH-MCNC: 4.1 G/DL — SIGNIFICANT CHANGE UP (ref 3.3–5)
ALP SERPL-CCNC: 67 U/L — SIGNIFICANT CHANGE UP (ref 40–120)
ALP SERPL-CCNC: 69 U/L — SIGNIFICANT CHANGE UP (ref 40–120)
ALT FLD-CCNC: 15 U/L — SIGNIFICANT CHANGE UP (ref 10–45)
ALT FLD-CCNC: 24 U/L — SIGNIFICANT CHANGE UP (ref 12–42)
ANION GAP SERPL CALC-SCNC: 11 MMOL/L — SIGNIFICANT CHANGE UP (ref 9–16)
ANION GAP SERPL CALC-SCNC: 15 MMOL/L — SIGNIFICANT CHANGE UP (ref 5–17)
APPEARANCE UR: CLEAR — SIGNIFICANT CHANGE UP
APTT BLD: 26.3 SEC — LOW (ref 27.5–36.3)
AST SERPL-CCNC: 19 U/L — SIGNIFICANT CHANGE UP (ref 15–37)
AST SERPL-CCNC: 20 U/L — SIGNIFICANT CHANGE UP (ref 10–40)
BASOPHILS # BLD AUTO: 0 K/UL — SIGNIFICANT CHANGE UP (ref 0–0.2)
BASOPHILS NFR BLD AUTO: 0 % — SIGNIFICANT CHANGE UP (ref 0–2)
BILIRUB SERPL-MCNC: 0.4 MG/DL — SIGNIFICANT CHANGE UP (ref 0.2–1.2)
BILIRUB SERPL-MCNC: 0.4 MG/DL — SIGNIFICANT CHANGE UP (ref 0.2–1.2)
BILIRUB UR-MCNC: NEGATIVE — SIGNIFICANT CHANGE UP
BUN SERPL-MCNC: 16 MG/DL — SIGNIFICANT CHANGE UP (ref 7–23)
BUN SERPL-MCNC: 17 MG/DL — SIGNIFICANT CHANGE UP (ref 7–23)
CALCIUM SERPL-MCNC: 9.4 MG/DL — SIGNIFICANT CHANGE UP (ref 8.5–10.5)
CALCIUM SERPL-MCNC: 9.6 MG/DL — SIGNIFICANT CHANGE UP (ref 8.4–10.5)
CHLORIDE SERPL-SCNC: 101 MMOL/L — SIGNIFICANT CHANGE UP (ref 96–108)
CHLORIDE SERPL-SCNC: 98 MMOL/L — SIGNIFICANT CHANGE UP (ref 96–108)
CO2 SERPL-SCNC: 24 MMOL/L — SIGNIFICANT CHANGE UP (ref 22–31)
CO2 SERPL-SCNC: 26 MMOL/L — SIGNIFICANT CHANGE UP (ref 22–31)
COLOR SPEC: YELLOW — SIGNIFICANT CHANGE UP
CREAT SERPL-MCNC: 0.62 MG/DL — SIGNIFICANT CHANGE UP (ref 0.5–1.3)
CREAT SERPL-MCNC: 0.78 MG/DL — SIGNIFICANT CHANGE UP (ref 0.5–1.3)
DIFF PNL FLD: NEGATIVE — SIGNIFICANT CHANGE UP
EOSINOPHIL # BLD AUTO: 0.27 K/UL — SIGNIFICANT CHANGE UP (ref 0–0.5)
EOSINOPHIL NFR BLD AUTO: 1 % — SIGNIFICANT CHANGE UP (ref 0–6)
GLUCOSE SERPL-MCNC: 265 MG/DL — HIGH (ref 70–99)
GLUCOSE SERPL-MCNC: 266 MG/DL — HIGH (ref 70–99)
GLUCOSE UR QL: 500
HCT VFR BLD CALC: 34.3 % — LOW (ref 34.5–45)
HGB BLD-MCNC: 11.6 G/DL — SIGNIFICANT CHANGE UP (ref 11.5–15.5)
INR BLD: 0.99 — SIGNIFICANT CHANGE UP (ref 0.88–1.16)
KETONES UR-MCNC: NEGATIVE — SIGNIFICANT CHANGE UP
LEUKOCYTE ESTERASE UR-ACNC: NEGATIVE — SIGNIFICANT CHANGE UP
LYMPHOCYTES # BLD AUTO: 12 % — LOW (ref 13–44)
LYMPHOCYTES # BLD AUTO: 3.19 K/UL — SIGNIFICANT CHANGE UP (ref 1–3.3)
MAGNESIUM SERPL-MCNC: 1.6 MG/DL — SIGNIFICANT CHANGE UP (ref 1.6–2.6)
MCHC RBC-ENTMCNC: 29 PG — SIGNIFICANT CHANGE UP (ref 27–34)
MCHC RBC-ENTMCNC: 33.8 GM/DL — SIGNIFICANT CHANGE UP (ref 32–36)
MCV RBC AUTO: 85.8 FL — SIGNIFICANT CHANGE UP (ref 80–100)
MONOCYTES # BLD AUTO: 1.06 K/UL — HIGH (ref 0–0.9)
MONOCYTES NFR BLD AUTO: 4 % — SIGNIFICANT CHANGE UP (ref 2–14)
NEUTROPHILS # BLD AUTO: 21.82 K/UL — HIGH (ref 1.8–7.4)
NEUTROPHILS NFR BLD AUTO: 82 % — HIGH (ref 43–77)
NITRITE UR-MCNC: NEGATIVE — SIGNIFICANT CHANGE UP
NRBC # BLD: SIGNIFICANT CHANGE UP /100 WBCS (ref 0–0)
PH UR: 6.5 — SIGNIFICANT CHANGE UP (ref 5–8)
PLATELET # BLD AUTO: 293 K/UL — SIGNIFICANT CHANGE UP (ref 150–400)
POTASSIUM SERPL-MCNC: 3.8 MMOL/L — SIGNIFICANT CHANGE UP (ref 3.5–5.3)
POTASSIUM SERPL-MCNC: 4 MMOL/L — SIGNIFICANT CHANGE UP (ref 3.5–5.3)
POTASSIUM SERPL-SCNC: 3.8 MMOL/L — SIGNIFICANT CHANGE UP (ref 3.5–5.3)
POTASSIUM SERPL-SCNC: 4 MMOL/L — SIGNIFICANT CHANGE UP (ref 3.5–5.3)
PROT SERPL-MCNC: 6.4 G/DL — SIGNIFICANT CHANGE UP (ref 6.4–8.2)
PROT SERPL-MCNC: 6.7 G/DL — SIGNIFICANT CHANGE UP (ref 6–8.3)
PROT UR-MCNC: NEGATIVE MG/DL — SIGNIFICANT CHANGE UP
PROTHROM AB SERPL-ACNC: 10.9 SEC — SIGNIFICANT CHANGE UP (ref 10–12.9)
RBC # BLD: 4 M/UL — SIGNIFICANT CHANGE UP (ref 3.8–5.2)
RBC # FLD: 16.1 % — HIGH (ref 10.3–14.5)
SARS-COV-2 RNA SPEC QL NAA+PROBE: SIGNIFICANT CHANGE UP
SODIUM SERPL-SCNC: 137 MMOL/L — SIGNIFICANT CHANGE UP (ref 135–145)
SODIUM SERPL-SCNC: 138 MMOL/L — SIGNIFICANT CHANGE UP (ref 132–145)
SP GR SPEC: 1.01 — SIGNIFICANT CHANGE UP (ref 1–1.03)
UROBILINOGEN FLD QL: 0.2 E.U./DL — SIGNIFICANT CHANGE UP
WBC # BLD: 26.61 K/UL — HIGH (ref 3.8–10.5)
WBC # FLD AUTO: 26.61 K/UL — HIGH (ref 3.8–10.5)

## 2020-05-29 PROCEDURE — 71045 X-RAY EXAM CHEST 1 VIEW: CPT | Mod: 26

## 2020-05-29 PROCEDURE — 72192 CT PELVIS W/O DYE: CPT | Mod: 26

## 2020-05-29 PROCEDURE — 73552 X-RAY EXAM OF FEMUR 2/>: CPT | Mod: 26,RT

## 2020-05-29 PROCEDURE — 72170 X-RAY EXAM OF PELVIS: CPT | Mod: 26

## 2020-05-29 PROCEDURE — 70450 CT HEAD/BRAIN W/O DYE: CPT | Mod: 26

## 2020-05-29 RX ORDER — HYDROMORPHONE HYDROCHLORIDE 2 MG/ML
0.5 INJECTION INTRAMUSCULAR; INTRAVENOUS; SUBCUTANEOUS ONCE
Refills: 0 | Status: DISCONTINUED | OUTPATIENT
Start: 2020-05-29 | End: 2020-05-29

## 2020-05-29 RX ORDER — MORPHINE SULFATE 50 MG/1
4 CAPSULE, EXTENDED RELEASE ORAL ONCE
Refills: 0 | Status: DISCONTINUED | OUTPATIENT
Start: 2020-05-29 | End: 2020-05-29

## 2020-05-29 RX ADMIN — MORPHINE SULFATE 4 MILLIGRAM(S): 50 CAPSULE, EXTENDED RELEASE ORAL at 16:55

## 2020-05-29 RX ADMIN — HYDROMORPHONE HYDROCHLORIDE 0.5 MILLIGRAM(S): 2 INJECTION INTRAMUSCULAR; INTRAVENOUS; SUBCUTANEOUS at 21:52

## 2020-05-29 RX ADMIN — HYDROMORPHONE HYDROCHLORIDE 0.5 MILLIGRAM(S): 2 INJECTION INTRAMUSCULAR; INTRAVENOUS; SUBCUTANEOUS at 23:04

## 2020-05-29 RX ADMIN — HYDROMORPHONE HYDROCHLORIDE 0.5 MILLIGRAM(S): 2 INJECTION INTRAMUSCULAR; INTRAVENOUS; SUBCUTANEOUS at 18:23

## 2020-05-29 NOTE — ED ADULT NURSE NOTE - OBJECTIVE STATEMENT
86 y/o F c/o R hip pain s/p mechanical fall down one step. pt denies any LOC, nausea, vomiting, hitting her head. pt unable to provide hx secondary to pain on arrival. R Leg noted shortened and rotated.

## 2020-05-29 NOTE — ED PROVIDER NOTE - CHPI ED SYMPTOMS POS
----- Message from Michelle Parikh sent at 2/22/2019  8:42 AM CST -----  Contact: [facility]-[phone]  Would like to follow-up with nurse regarding pt's wound care; Pt states he was never prescribed Santyl, and there is no ind abscess to pack.  Please call back at [phone]. Md Arik   PAIN

## 2020-05-29 NOTE — ED PROVIDER NOTE - CLINICAL SUMMARY MEDICAL DECISION MAKING FREE TEXT BOX
Pt with right hip pain and externally rotated right leg s/p mechanical fall. EKG, analgesics, CT head/hip, CXR, and xray of pelvis ordered. likely hip fracture. will reassess. Pt with right hip pain and externally rotated right leg s/p mechanical fall. EKG, analgesics, CT head/hip, CXR, and xray of pelvis ordered. likely hip fracture. will reassess.    left femur fracture- admitted to Ortho service.   Covid negative

## 2020-05-29 NOTE — ED PROVIDER NOTE - MUSCULOSKELETAL, MLM
point tenderness to right hip, no c-spine or L-spine tenderness. right leg rotated outwards, pulses intact bilaterally, no swelling, erythema or ecchymosis.

## 2020-05-29 NOTE — ED ADULT TRIAGE NOTE - CHIEF COMPLAINT QUOTE
Patient to ED s/p mechanical fall down 1 step.  Complaining of right leg pain.  No LOC or blood thinners.  No obvious deformity.

## 2020-05-29 NOTE — ED PROVIDER NOTE - PROGRESS NOTE DETAILS
Endorsed to both medicine and Ortho- COVID and chem delay due to lab machine malfunction and swabs being sent uptown. Patient will be placed on Ortho floor if COVID negative and chemistry okay. If Covid positive will attempt to admit to medicine. Level of care would be tele per Ortho. Attempted to expedite to due delay in swabs and labs Patient admitted to ortho tele endorsed to team. Patient appears well no change in clinical status

## 2020-05-29 NOTE — ED PROVIDER NOTE - ATTENDING CONTRIBUTION TO CARE
89 y.o. female with R hip fx after mechanical fall, accepted for admission to [facility] orthopedics by dr ivy

## 2020-05-29 NOTE — ED PROVIDER NOTE - OBJECTIVE STATEMENT
86 yo F w/ PMHx of DM BIBEMS c/o right hip pain s/p mechanical fall today. Pt was walking in a store when she tripped, fell down 1 step and landed on her right side. Pt is unable to ambulate s/p fall. No syncope, chest pain, N/V, fever, chills, head trauma.

## 2020-05-30 ENCOUNTER — INPATIENT (INPATIENT)
Facility: HOSPITAL | Age: 85
LOS: 1 days | Discharge: NURSING, REHAB OR LONG-TERM CARE | DRG: 481 | End: 2020-06-01
Attending: ORTHOPAEDIC SURGERY | Admitting: ORTHOPAEDIC SURGERY
Payer: MEDICARE

## 2020-05-30 DIAGNOSIS — I10 ESSENTIAL (PRIMARY) HYPERTENSION: ICD-10-CM

## 2020-05-30 DIAGNOSIS — S72.141K DISPLACED INTERTROCHANTERIC FRACTURE OF RIGHT FEMUR, SUBSEQUENT ENCOUNTER FOR CLOSED FRACTURE WITH NONUNION: ICD-10-CM

## 2020-05-30 DIAGNOSIS — Z96.642 PRESENCE OF LEFT ARTIFICIAL HIP JOINT: Chronic | ICD-10-CM

## 2020-05-30 DIAGNOSIS — Z01.818 ENCOUNTER FOR OTHER PREPROCEDURAL EXAMINATION: ICD-10-CM

## 2020-05-30 DIAGNOSIS — Z29.9 ENCOUNTER FOR PROPHYLACTIC MEASURES, UNSPECIFIED: ICD-10-CM

## 2020-05-30 DIAGNOSIS — R63.8 OTHER SYMPTOMS AND SIGNS CONCERNING FOOD AND FLUID INTAKE: ICD-10-CM

## 2020-05-30 DIAGNOSIS — R03.0 ELEVATED BLOOD-PRESSURE READING, WITHOUT DIAGNOSIS OF HYPERTENSION: ICD-10-CM

## 2020-05-30 DIAGNOSIS — D72.829 ELEVATED WHITE BLOOD CELL COUNT, UNSPECIFIED: ICD-10-CM

## 2020-05-30 DIAGNOSIS — E11.9 TYPE 2 DIABETES MELLITUS WITHOUT COMPLICATIONS: ICD-10-CM

## 2020-05-30 LAB
A1C WITH ESTIMATED AVERAGE GLUCOSE RESULT: 7.4 % — HIGH (ref 4–5.6)
ANION GAP SERPL CALC-SCNC: 10 MMOL/L — SIGNIFICANT CHANGE UP (ref 5–17)
BLD GP AB SCN SERPL QL: NEGATIVE — SIGNIFICANT CHANGE UP
BUN SERPL-MCNC: 18 MG/DL — SIGNIFICANT CHANGE UP (ref 7–23)
CALCIUM SERPL-MCNC: 8.8 MG/DL — SIGNIFICANT CHANGE UP (ref 8.4–10.5)
CHLORIDE SERPL-SCNC: 99 MMOL/L — SIGNIFICANT CHANGE UP (ref 96–108)
CO2 SERPL-SCNC: 27 MMOL/L — SIGNIFICANT CHANGE UP (ref 22–31)
CREAT SERPL-MCNC: 0.69 MG/DL — SIGNIFICANT CHANGE UP (ref 0.5–1.3)
ESTIMATED AVERAGE GLUCOSE: 166 MG/DL — HIGH (ref 68–114)
GLUCOSE BLDC GLUCOMTR-MCNC: 186 MG/DL — HIGH (ref 70–99)
GLUCOSE BLDC GLUCOMTR-MCNC: 218 MG/DL — HIGH (ref 70–99)
GLUCOSE BLDC GLUCOMTR-MCNC: 219 MG/DL — HIGH (ref 70–99)
GLUCOSE BLDC GLUCOMTR-MCNC: 238 MG/DL — HIGH (ref 70–99)
GLUCOSE SERPL-MCNC: 217 MG/DL — HIGH (ref 70–99)
HCT VFR BLD CALC: 24.9 % — LOW (ref 34.5–45)
HCT VFR BLD CALC: 27.9 % — LOW (ref 34.5–45)
HGB BLD-MCNC: 8.1 G/DL — LOW (ref 11.5–15.5)
HGB BLD-MCNC: 9.2 G/DL — LOW (ref 11.5–15.5)
MCHC RBC-ENTMCNC: 29.1 PG — SIGNIFICANT CHANGE UP (ref 27–34)
MCHC RBC-ENTMCNC: 29.3 PG — SIGNIFICANT CHANGE UP (ref 27–34)
MCHC RBC-ENTMCNC: 32.5 GM/DL — SIGNIFICANT CHANGE UP (ref 32–36)
MCHC RBC-ENTMCNC: 33 GM/DL — SIGNIFICANT CHANGE UP (ref 32–36)
MCV RBC AUTO: 88.3 FL — SIGNIFICANT CHANGE UP (ref 80–100)
MCV RBC AUTO: 90.2 FL — SIGNIFICANT CHANGE UP (ref 80–100)
NRBC # BLD: 0 /100 WBCS — SIGNIFICANT CHANGE UP (ref 0–0)
NRBC # BLD: 0 /100 WBCS — SIGNIFICANT CHANGE UP (ref 0–0)
PLATELET # BLD AUTO: 196 K/UL — SIGNIFICANT CHANGE UP (ref 150–400)
PLATELET # BLD AUTO: 227 K/UL — SIGNIFICANT CHANGE UP (ref 150–400)
POTASSIUM SERPL-MCNC: 4.9 MMOL/L — SIGNIFICANT CHANGE UP (ref 3.5–5.3)
POTASSIUM SERPL-SCNC: 4.9 MMOL/L — SIGNIFICANT CHANGE UP (ref 3.5–5.3)
RBC # BLD: 2.76 M/UL — LOW (ref 3.8–5.2)
RBC # BLD: 3.16 M/UL — LOW (ref 3.8–5.2)
RBC # FLD: 15.9 % — HIGH (ref 10.3–14.5)
RBC # FLD: 15.9 % — HIGH (ref 10.3–14.5)
RH IG SCN BLD-IMP: POSITIVE — SIGNIFICANT CHANGE UP
SODIUM SERPL-SCNC: 136 MMOL/L — SIGNIFICANT CHANGE UP (ref 135–145)
WBC # BLD: 13.78 K/UL — HIGH (ref 3.8–10.5)
WBC # BLD: 27.9 K/UL — HIGH (ref 3.8–10.5)
WBC # FLD AUTO: 13.78 K/UL — HIGH (ref 3.8–10.5)
WBC # FLD AUTO: 27.9 K/UL — HIGH (ref 3.8–10.5)

## 2020-05-30 PROCEDURE — 99284 EMERGENCY DEPT VISIT MOD MDM: CPT | Mod: CS

## 2020-05-30 PROCEDURE — 27245 TREAT THIGH FRACTURE: CPT | Mod: RT

## 2020-05-30 PROCEDURE — 99232 SBSQ HOSP IP/OBS MODERATE 35: CPT

## 2020-05-30 RX ORDER — OXYCODONE HYDROCHLORIDE 5 MG/1
5 TABLET ORAL EVERY 4 HOURS
Refills: 0 | Status: DISCONTINUED | OUTPATIENT
Start: 2020-05-30 | End: 2020-06-01

## 2020-05-30 RX ORDER — DEXTROSE 50 % IN WATER 50 %
25 SYRINGE (ML) INTRAVENOUS ONCE
Refills: 0 | Status: DISCONTINUED | OUTPATIENT
Start: 2020-05-30 | End: 2020-06-01

## 2020-05-30 RX ORDER — SODIUM CHLORIDE 9 MG/ML
500 INJECTION, SOLUTION INTRAVENOUS
Refills: 0 | Status: DISCONTINUED | OUTPATIENT
Start: 2020-05-30 | End: 2020-06-01

## 2020-05-30 RX ORDER — SODIUM CHLORIDE 9 MG/ML
1000 INJECTION, SOLUTION INTRAVENOUS
Refills: 0 | Status: DISCONTINUED | OUTPATIENT
Start: 2020-05-30 | End: 2020-06-01

## 2020-05-30 RX ORDER — ONDANSETRON 8 MG/1
4 TABLET, FILM COATED ORAL EVERY 6 HOURS
Refills: 0 | Status: DISCONTINUED | OUTPATIENT
Start: 2020-05-30 | End: 2020-06-01

## 2020-05-30 RX ORDER — TRAMADOL HYDROCHLORIDE 50 MG/1
50 TABLET ORAL EVERY 4 HOURS
Refills: 0 | Status: DISCONTINUED | OUTPATIENT
Start: 2020-05-30 | End: 2020-06-01

## 2020-05-30 RX ORDER — HYDROMORPHONE HYDROCHLORIDE 2 MG/ML
0.5 INJECTION INTRAMUSCULAR; INTRAVENOUS; SUBCUTANEOUS EVERY 6 HOURS
Refills: 0 | Status: DISCONTINUED | OUTPATIENT
Start: 2020-05-30 | End: 2020-05-30

## 2020-05-30 RX ORDER — INSULIN LISPRO 100/ML
VIAL (ML) SUBCUTANEOUS AT BEDTIME
Refills: 0 | Status: DISCONTINUED | OUTPATIENT
Start: 2020-05-30 | End: 2020-06-01

## 2020-05-30 RX ORDER — TRAMADOL HYDROCHLORIDE 50 MG/1
50 TABLET ORAL EVERY 6 HOURS
Refills: 0 | Status: DISCONTINUED | OUTPATIENT
Start: 2020-05-30 | End: 2020-05-30

## 2020-05-30 RX ORDER — CEFAZOLIN SODIUM 1 G
2000 VIAL (EA) INJECTION EVERY 8 HOURS
Refills: 0 | Status: DISCONTINUED | OUTPATIENT
Start: 2020-05-30 | End: 2020-05-31

## 2020-05-30 RX ORDER — GLUCAGON INJECTION, SOLUTION 0.5 MG/.1ML
1 INJECTION, SOLUTION SUBCUTANEOUS ONCE
Refills: 0 | Status: DISCONTINUED | OUTPATIENT
Start: 2020-05-30 | End: 2020-06-01

## 2020-05-30 RX ORDER — MAGNESIUM HYDROXIDE 400 MG/1
30 TABLET, CHEWABLE ORAL DAILY
Refills: 0 | Status: DISCONTINUED | OUTPATIENT
Start: 2020-05-30 | End: 2020-06-01

## 2020-05-30 RX ORDER — INSULIN LISPRO 100/ML
VIAL (ML) SUBCUTANEOUS
Refills: 0 | Status: DISCONTINUED | OUTPATIENT
Start: 2020-05-30 | End: 2020-06-01

## 2020-05-30 RX ORDER — DEXTROSE 50 % IN WATER 50 %
15 SYRINGE (ML) INTRAVENOUS ONCE
Refills: 0 | Status: DISCONTINUED | OUTPATIENT
Start: 2020-05-30 | End: 2020-06-01

## 2020-05-30 RX ORDER — TRAMADOL HYDROCHLORIDE 50 MG/1
25 TABLET ORAL EVERY 6 HOURS
Refills: 0 | Status: DISCONTINUED | OUTPATIENT
Start: 2020-05-30 | End: 2020-05-30

## 2020-05-30 RX ORDER — ASPIRIN/CALCIUM CARB/MAGNESIUM 324 MG
81 TABLET ORAL
Refills: 0 | Status: DISCONTINUED | OUTPATIENT
Start: 2020-05-30 | End: 2020-06-01

## 2020-05-30 RX ORDER — MORPHINE SULFATE 50 MG/1
4 CAPSULE, EXTENDED RELEASE ORAL
Refills: 0 | Status: DISCONTINUED | OUTPATIENT
Start: 2020-05-30 | End: 2020-05-30

## 2020-05-30 RX ORDER — DEXTROSE 50 % IN WATER 50 %
12.5 SYRINGE (ML) INTRAVENOUS ONCE
Refills: 0 | Status: DISCONTINUED | OUTPATIENT
Start: 2020-05-30 | End: 2020-06-01

## 2020-05-30 RX ORDER — SODIUM CHLORIDE 9 MG/ML
1000 INJECTION, SOLUTION INTRAVENOUS
Refills: 0 | Status: DISCONTINUED | OUTPATIENT
Start: 2020-05-30 | End: 2020-05-30

## 2020-05-30 RX ORDER — INSULIN LISPRO 100/ML
VIAL (ML) SUBCUTANEOUS
Refills: 0 | Status: DISCONTINUED | OUTPATIENT
Start: 2020-05-30 | End: 2020-05-30

## 2020-05-30 RX ORDER — ACETAMINOPHEN 500 MG
975 TABLET ORAL EVERY 8 HOURS
Refills: 0 | Status: DISCONTINUED | OUTPATIENT
Start: 2020-05-30 | End: 2020-06-01

## 2020-05-30 RX ADMIN — SODIUM CHLORIDE 100 MILLILITER(S): 9 INJECTION, SOLUTION INTRAVENOUS at 03:50

## 2020-05-30 RX ADMIN — OXYCODONE HYDROCHLORIDE 5 MILLIGRAM(S): 5 TABLET ORAL at 22:45

## 2020-05-30 RX ADMIN — Medication 81 MILLIGRAM(S): at 18:19

## 2020-05-30 RX ADMIN — Medication 4: at 14:40

## 2020-05-30 RX ADMIN — Medication 975 MILLIGRAM(S): at 22:45

## 2020-05-30 RX ADMIN — Medication 4: at 18:45

## 2020-05-30 RX ADMIN — Medication 975 MILLIGRAM(S): at 14:49

## 2020-05-30 RX ADMIN — Medication 2000 MILLIGRAM(S): at 18:19

## 2020-05-30 RX ADMIN — HYDROMORPHONE HYDROCHLORIDE 0.5 MILLIGRAM(S): 2 INJECTION INTRAMUSCULAR; INTRAVENOUS; SUBCUTANEOUS at 04:29

## 2020-05-30 RX ADMIN — Medication 975 MILLIGRAM(S): at 07:00

## 2020-05-30 RX ADMIN — TRAMADOL HYDROCHLORIDE 50 MILLIGRAM(S): 50 TABLET ORAL at 06:51

## 2020-05-30 RX ADMIN — Medication 2: at 06:57

## 2020-05-30 NOTE — BRIEF OPERATIVE NOTE - NSICDXBRIEFPOSTOP_GEN_ALL_CORE_FT
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

## 2020-05-30 NOTE — CONSULT NOTE ADULT - PROBLEM SELECTOR RECOMMENDATION 9
Pt with history of type 2 DM on Metformin 850mg? BID and no known cardiac history. Pt follows with a PCP and was never told she had any cardiac issues and has never been referred to a cardiology.   RCRI Revised Cardiac Risk Index for Pre-Operative Risk = 0   Moreno Score 0%   - would obtain Hga1c to evaluate diabetes  - would obtain baseline EKG Pt with history of type 2 DM on Metformin 850mg? BID and no known cardiac history. Pt follows with a PCP and was never told she had any cardiac issues and has never been referred to a cardiology.   RCRI Revised Cardiac Risk Index for Pre-Operative Risk = 0   Moreno Score 0%   - would obtain Hga1c to evaluate diabetes  - would obtain baseline EKG  - cleared for procedure tomorrow Pt s/p mechanical fall with CT Pelvis demonstrating  CT Pelvis demonstrated Mildly displaced comminuted intertrochanteric fracture of the right femur.  - Will be going for intramedullary nail tomorrow by orthopedics  -pain control as per primary team

## 2020-05-30 NOTE — CONSULT NOTE ADULT - PROBLEM SELECTOR RECOMMENDATION 5
Pt with hx of Type 2 DM on Metformin 850mg BID. Hga1c 7.7 in 2017. Blood glucose > 200 on admission.   - obtain Hga1c   - would start Moderate dose insulin sliding scale   - Carb Steady Diet

## 2020-05-30 NOTE — CONSULT NOTE ADULT - ATTENDING COMMENTS
patient seen and examined overnight    reviewed pertinent data, h&p, PE findings as above     1. preop: pt medically optimized for ortho procedure, med consult will follow post op        rest of plan as above

## 2020-05-30 NOTE — CONSULT NOTE ADULT - PROBLEM SELECTOR RECOMMENDATION 6
VTE Prophylaxis: as per primary team  GI: not needed  C: Full Code  D: VTE Prophylaxis: as per primary team  GI: not needed  C: Full Code

## 2020-05-30 NOTE — H&P ADULT - HISTORY OF PRESENT ILLNESS
85F PMH DM, HTN presenting with R hip pain following mechanical fall. Pt states she was in a store going down a set of stairs when she slipped down the last few steps and fell onto her R hip. She was unable to get up or bear weight on the hip afterwards. Denies head strike or LOC. Denies any other injuries. Denies numbness or tingling in the leg.

## 2020-05-30 NOTE — H&P ADULT - ASSESSMENT
85F PMH DM with R IT fx plan for OR 5/30  - Pain control  - NPO/IVF  - Hold anticoagulation for OR  - Labs  - PT/INR, T+S  - CXR  - EKG  - UA  - Medical clearance  - Discussed with attending

## 2020-05-30 NOTE — H&P ADULT - ATTENDING COMMENTS
Agree with plan as above. Plan for OR today for IMN right hip. R/B/A reviewed with patient.  Likely need for EMELY dc planning given live at home alone with two dogs (10th floor) with intermittent elevator out of order.    Terrance Hernandez MD  Orthopaedic Attending Surgeon

## 2020-05-30 NOTE — BRIEF OPERATIVE NOTE - NSICDXBRIEFPREOP_GEN_ALL_CORE_FT
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

## 2020-05-30 NOTE — H&P ADULT - NSHPPHYSICALEXAM_GEN_ALL_CORE
Physical Exam:  General: Resting comfortably in bed. AAOx3. NAD.  Extremities:        LLE: No gross deformity. SILT L2-S1 distribution, symmetric. TA/EHL/FHL/GS motor intact. WWP, DP 2+       RLE: Leg slightly shortened. SILT L2-S1 distribution, symmetric. TA/EHL/FHL/GS motor intact, strength limited 2/2 pain. WWP, DP 2+

## 2020-05-30 NOTE — CONSULT NOTE ADULT - PROBLEM SELECTOR RECOMMENDATION 2
Pt s/p mechanical fall with CT Pelvis demonstrating  CT Pelvis demonstrated Mildly displaced comminuted intertrochanteric fracture of the right femur.  - Will be going for intramedullary nail tomorrow by orthopedics  -pain control as per primary team Pt with history of type 2 DM on Metformin 850mg? BID and no known cardiac history. Pt follows with a PCP and was never told she had any cardiac issues and has never been referred to a cardiology.   RCRI Revised Cardiac Risk Index for Pre-Operative Risk = 0   Moreno Score 0%   - would obtain Hga1c to evaluate diabetes  - would obtain baseline EKG  - cleared for procedure tomorrow

## 2020-05-30 NOTE — PROGRESS NOTE ADULT - SUBJECTIVE AND OBJECTIVE BOX
SUBJECTIVE: Patient seen and examined.  c/o pain of R hip with movement    OBJECTIVE:  NAD  Vital Signs Last 24 Hrs  T(C): 36.7 (30 May 2020 21:07), Max: 37.4 (30 May 2020 04:35)  T(F): 98 (30 May 2020 21:07), Max: 99.3 (30 May 2020 04:35)  HR: 73 (30 May 2020 21:07) (49 - 95)  BP: 129/63 (30 May 2020 21:07) (82/42 - 164/70)  BP(mean): 81 (30 May 2020 19:00) (58 - 100)  RR: 18 (30 May 2020 21:07) (12 - 28)  SpO2: 97% (30 May 2020 21:07) (95% - 100%)    Affected extremity:          ttp over R hip          Sensation: SILT         Motor exam: intact TA/GS/EHL         warm well perfused; capillary refill <3 seconds                    labs pending    A/P :  Pt is a 84yo Female R IT fx  -    Pain control  -    DVT ppx: SCD/  -    Weight bearing status: NWB RLE  -    Dispo: OR today    Chuy Lyles MD  Senior Orthopaedic Resident  Orthopaedic Surg SUBJECTIVE: Patient seen and examined at 630am.  c/o pain of R hip with movement    OBJECTIVE:  NAD  Vital Signs Last 24 Hrs  T(C): 36.7 (30 May 2020 21:07), Max: 37.4 (30 May 2020 04:35)  T(F): 98 (30 May 2020 21:07), Max: 99.3 (30 May 2020 04:35)  HR: 73 (30 May 2020 21:07) (49 - 95)  BP: 129/63 (30 May 2020 21:07) (82/42 - 164/70)  BP(mean): 81 (30 May 2020 19:00) (58 - 100)  RR: 18 (30 May 2020 21:07) (12 - 28)  SpO2: 97% (30 May 2020 21:07) (95% - 100%)    Affected extremity:          ttp over R hip          Sensation: SILT         Motor exam: intact TA/GS/EHL         warm well perfused; capillary refill <3 seconds                    labs pending    A/P :  Pt is a 86yo Female R IT fx  -    Pain control  -    DVT ppx: SCD/  -    Weight bearing status: NWB RLE  -    Dispo: OR today    Chuy Lyles MD  Senior Orthopaedic Resident  Orthopaedic Surg

## 2020-05-30 NOTE — PROGRESS NOTE ADULT - SUBJECTIVE AND OBJECTIVE BOX
Ortho Post Op Check    Procedure: R hip IMN  Surgeon: David     Pt comfortable without complaints, pain controlled  Denies CP, SOB, N/V, numbness/tingling     Vital Signs Last 24 Hrs  T(C): 36.1 (05-30-20 @ 14:19), Max: 36.1 (05-30-20 @ 14:19)  T(F): 97 (05-30-20 @ 14:19), Max: 97 (05-30-20 @ 14:19)  HR: 74 (05-30-20 @ 16:30) (49 - 78)  BP: 114/58 (05-30-20 @ 16:30) (82/42 - 115/56)  BP(mean): 83 (05-30-20 @ 16:30) (58 - 83)  RR: 18 (05-30-20 @ 16:30) (12 - 24)  SpO2: 97% (05-30-20 @ 16:30) (97% - 100%)  AVSS    General: Pt Alert and oriented, NAD  DSG C/D/I  Pulses: DP2+  Sensation: SILT saph/sural/sp/dp/t  Motor: firing EHL/FHL/TA/GS                          8.1    27.90 )-----------( 196      ( 30 May 2020 13:27 )             24.9   30 May 2020 06:45    136    |  99     |  18     ----------------------------<  217    4.9     |  27     |  0.69     Mg     1.6       29 May 2020 17:01    TPro  6.4    /  Alb  3.5    /  TBili  0.4    /  DBili  x      /  AST  19     /  ALT  24     /  AlkPhos  67     29 May 2020 21:21      Post-op X-Ray: intra-op fluoro shows hardware in place    A/P: 85yFemale POD#0 s/p above procedure  - Stable  - Pain Control  - DVT ppx: ASA  - Post op abx: Ancef  - PT, WBS: WBAT RLE    Ortho Pager [phone]

## 2020-05-30 NOTE — CONSULT NOTE ADULT - PROBLEM SELECTOR PROBLEM 2
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

## 2020-05-30 NOTE — CONSULT NOTE ADULT - PROBLEM SELECTOR PROBLEM 3
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

## 2020-05-30 NOTE — CONSULT NOTE ADULT - PROBLEM SELECTOR RECOMMENDATION 3
Pt with WBC of 26.6 likely 2/2 reactive to stressor. Unlikely infectious process at this time. Pt afebrile, with clear CXR, and negative UA. Pt not complaining of F, cough, dysuria, abdominal pain, N/V, diarrhea at this time. Pt with WBC of 26.6 likely 2/2 reactive to stressor. Unlikely infectious process at this time. Pt afebrile, with clear CXR, and negative UA. Pt not complaining of F, cough, dysuria, abdominal pain, N/V, diarrhea at this time.    ADDENDUM: monitor CBC, further workup by heme if WBC remains elevated

## 2020-05-30 NOTE — CONSULT NOTE ADULT - ASSESSMENT
84 y/o F with hx of Type 2 DM (on metformin) with hx of L hip fx s/p L hemiarthroplasty (2017) that presents s/p mechanical fall earlier today. Ortho consulted and will be going for IM nail tomorrow morning. Medicine consulted for pre-op clearance.

## 2020-05-30 NOTE — CONSULT NOTE ADULT - PROBLEM SELECTOR PROBLEM 1
Pre-operative clearance Closed comminuted intertrochanteric fracture of proximal end of femur with nonunion, right

## 2020-05-30 NOTE — CONSULT NOTE ADULT - SUBJECTIVE AND OBJECTIVE BOX
INTERNAL MEDICINE SERVICE INITIAL CONSULT NOTE    HPI:      ADDITIONAL MEDICINE HPI:    REVIEW OF SYSTEMS:   Otherwise negative except as specified in HPI    PAST MEDICAL HISTORY:     PAST SURGICAL HISTORY:    FAMILY HISTORY:    SOCIAL HISTORY:  Tobacco use:  EtOH use:  Illicit drug use:    MEDICATIONS:  MEDICATIONS  (STANDING):    MEDICATIONS  (PRN):      ALLERGIES:  Allergies    No Known Allergies    Intolerances        VITAL SIGNS:  Vital Signs Last 24 Hrs  T(C): 37.2 (30 May 2020 00:20), Max: 37.2 (30 May 2020 00:20)  T(F): 98.9 (30 May 2020 00:20), Max: 98.9 (30 May 2020 00:20)  HR: 95 (30 May 2020 00:20) (86 - 99)  BP: 164/70 (30 May 2020 00:20) (145/73 - 188/70)  BP(mean): 100 (30 May 2020 00:20) (100 - 100)  RR: 16 (29 May 2020 23:48) (16 - 18)  SpO2: 95% (30 May 2020 00:20) (95% - 97%)      PHYSICAL EXAM:  Constitutional: WDWN resting comfortably in bed; NAD  Head: NC/AT  Eyes: PERRL, EOMI, anicteric sclera  ENT: no nasal discharge; uvula midline, no oropharyngeal erythema or exudates; MMM  Neck: supple; no JVD or thyromegaly  Respiratory: CTA B/L; no W/R/R, no retractions  Cardiac: +S1/S2; RRR; no M/R/G; PMI non-displaced  Gastrointestinal: abdomen soft, NT/ND; no rebound or guarding; +BSx4  Genitourinary: normal external genitalia  Back: spine midline, no bony tenderness or step-offs; no CVAT B/L  Extremities: WWP, no clubbing or cyanosis; no peripheral edema  Musculoskeletal: NROM x4; no joint swelling, tenderness or erythema  Vascular: 2+ radial, femoral, DP/PT pulses B/L  Dermatologic: skin warm, dry and intact; no rashes, wounds, or scars  Lymphatic: no submandibular or cervical LAD  Neurologic: AAOx3; CNII-XII grossly intact; no focal deficits  Psychiatric: affect and characteristics of appearance, verbalizations, behaviors are appropriate    LABS:                        11.6   26.61 )-----------( 293      ( 29 May 2020 17:01 )             34.3         138  |  101  |  17  ----------------------------<  266<H>  3.8   |  26  |  0.78    Ca    9.4      29 May 2020 21:21  Mg     1.6         TPro  6.4  /  Alb  3.5  /  TBili  0.4  /  DBili  x   /  AST  19  /  ALT  24  /  AlkPhos  67      PT/INR - ( 29 May 2020 17:01 )   PT: 10.9 sec;   INR: 0.99          PTT - ( 29 May 2020 17:01 )  PTT:26.3 sec  Urinalysis Basic - ( 29 May 2020 22:05 )    Color: Yellow / Appearance: Clear / S.015 / pH: x  Gluc: x / Ketone: NEGATIVE  / Bili: NEGATIVE / Urobili: 0.2 E.U./dL   Blood: x / Protein: NEGATIVE mg/dL / Nitrite: NEGATIVE   Leuk Esterase: NEGATIVE / RBC: x / WBC x   Sq Epi: x / Non Sq Epi: x / Bacteria: x          CAPILLARY BLOOD GLUCOSE              RADIOLOGY & ADDITIONAL TESTS: Reviewed. INTERNAL MEDICINE SERVICE INITIAL CONSULT NOTE    HPI:  84 y/o F with hx of Type 2 DM (on metformin) with hx of L hip fx s/p L hemiarthroplasty (2017) that presents s/p mechanical fall earlier today. Pt states she went to the pet store to buy dog food and missed a step on the staircase causing her to fall and land on her R side. Pt denies any LOC, CP, SOB, palpitations, visual changes, headaches, or UE/LE weakness during the time of the fall. Pt remembers the entire fall and states she did not hit her head.     In the ED VS T 98.5, HR 99, 188/70 -> 146/81, RR 18, saturating 97% on RA. Labs remarkable for leukocytosis of 26.61 and glucose 266. CT head demonstrated no acute intracranial pathology, CT Pelvis demonstrated Mildly displaced comminuted intertrochanteric fracture of the right femur. Swelling of the adductor muscles of the upper thigh, most likely from intramuscular hematoma. Ortho consulted and will be going for IM nail tomorrow morning. Medicine consulted for pre-op clearance.       ADDITIONAL MEDICINE HPI:    REVIEW OF SYSTEMS:   Otherwise negative except as specified in HPI    PAST MEDICAL HISTORY:   Type 2 DM    PAST SURGICAL HISTORY:  L hip arthroplasty  Lap cholecystectomy       MEDICATIONS:  MEDICATIONS  (STANDING):    MEDICATIONS  (PRN):      ALLERGIES:  Allergies    No Known Allergies    Intolerances        VITAL SIGNS:  Vital Signs Last 24 Hrs  T(C): 37.2 (30 May 2020 00:20), Max: 37.2 (30 May 2020 00:20)  T(F): 98.9 (30 May 2020 00:20), Max: 98.9 (30 May 2020 00:20)  HR: 95 (30 May 2020 00:20) (86 - 99)  BP: 164/70 (30 May 2020 00:20) (145/73 - 188/70)  BP(mean): 100 (30 May 2020 00:20) (100 - 100)  RR: 16 (29 May 2020 23:48) (16 - 18)  SpO2: 95% (30 May 2020 00:20) (95% - 97%)      PHYSICAL EXAM:  Constitutional: WDWN resting comfortably in bed; with mild discomfort   Head: NC/AT  Eyes: PERRL, EOMI, anicteric sclera  ENT: no nasal discharge; uvula midline, no oropharyngeal erythema or exudates; MMM  Neck: supple; no JVD or thyromegaly  Respiratory: CTA B/L; no W/R/R, no retractions  Cardiac: +S1/S2; RRR; no M/R/G; PMI non-displaced  Gastrointestinal: abdomen soft, NT/ND; no rebound or guarding; +BSx4  Genitourinary: strong in place draining clear yellow urine   Extremities: WWP, no clubbing or cyanosis; mild edema around R hip / thigh  Musculoskeletal: decreased ROM of RLE and R hip limited by pain, decreased dorsiflexion of R foot due to pain. LLE full ROM.   Vascular: 2+ radial, femoral, DP/PT pulses B/L  Lymphatic: no submandibular or cervical LAD  Neurologic: AAOx3; CNII-XII grossly intact; no focal deficits  Psychiatric: affect and characteristics of appearance, verbalizations, behaviors are appropriate    LABS:                        11.6   26.61 )-----------( 293      ( 29 May 2020 17:01 )             34.3         138  |  101  |  17  ----------------------------<  266<H>  3.8   |  26  |  0.78    Ca    9.4      29 May 2020 21:21  Mg     1.6         TPro  6.4  /  Alb  3.5  /  TBili  0.4  /  DBili  x   /  AST  19  /  ALT  24  /  AlkPhos  67      PT/INR - ( 29 May 2020 17:01 )   PT: 10.9 sec;   INR: 0.99          PTT - ( 29 May 2020 17:01 )  PTT:26.3 sec  Urinalysis Basic - ( 29 May 2020 22:05 )    Color: Yellow / Appearance: Clear / S.015 / pH: x  Gluc: x / Ketone: NEGATIVE  / Bili: NEGATIVE / Urobili: 0.2 E.U./dL   Blood: x / Protein: NEGATIVE mg/dL / Nitrite: NEGATIVE   Leuk Esterase: NEGATIVE / RBC: x / WBC x   Sq Epi: x / Non Sq Epi: x / Bacteria: x          CAPILLARY BLOOD GLUCOSE              RADIOLOGY & ADDITIONAL TESTS: Reviewed. INTERNAL MEDICINE SERVICE INITIAL CONSULT NOTE    HPI:  84 y/o F with hx of Type 2 DM (on metformin) with hx of L hip fx s/p L hemiarthroplasty (2017) that presents s/p mechanical fall earlier today. Pt states she went to the pet store to buy dog food and missed a step on the staircase causing her to fall and land on her R side. Pt denies any LOC, CP, SOB, palpitations, visual changes, headaches, or UE/LE weakness during the time of the fall. Pt remembers the entire fall and states she did not hit her head.     In the ED VS T 98.5, HR 99, 188/70 -> 146/81, RR 18, saturating 97% on RA. Labs remarkable for leukocytosis of 26.61 and glucose 266. CT head demonstrated no acute intracranial pathology, CT Pelvis demonstrated Mildly displaced comminuted intertrochanteric fracture of the right femur. Swelling of the adductor muscles of the upper thigh, most likely from intramuscular hematoma. Ortho consulted and will be going for IM nail tomorrow morning. Medicine consulted for pre-op clearance.       ADDITIONAL MEDICINE HPI:    REVIEW OF SYSTEMS:   Otherwise negative except as specified in HPI    PAST MEDICAL HISTORY:   Type 2 DM    PAST SURGICAL HISTORY:  L hip arthroplasty  Lap cholecystectomy     Socail HX: denies smoking, drinks occasionally, denies drug use  FHX: mother w/ PPM    MEDICATIONS:  MEDICATIONS  (STANDING):    MEDICATIONS  (PRN):      ALLERGIES:  Allergies    No Known Allergies    Intolerances        VITAL SIGNS:  Vital Signs Last 24 Hrs  T(C): 37.2 (30 May 2020 00:20), Max: 37.2 (30 May 2020 00:20)  T(F): 98.9 (30 May 2020 00:20), Max: 98.9 (30 May 2020 00:20)  HR: 95 (30 May 2020 00:20) (86 - 99)  BP: 164/70 (30 May 2020 00:20) (145/73 - 188/70)  BP(mean): 100 (30 May 2020 00:20) (100 - 100)  RR: 16 (29 May 2020 23:48) (16 - 18)  SpO2: 95% (30 May 2020 00:20) (95% - 97%)      PHYSICAL EXAM:  Constitutional: WDWN resting comfortably in bed; with mild discomfort   Head: NC/AT  Eyes: PERRL, EOMI, anicteric sclera  ENT: no nasal discharge; uvula midline, no oropharyngeal erythema or exudates; MMM  Neck: supple; no JVD or thyromegaly  Respiratory: CTA B/L; no W/R/R, no retractions  Cardiac: +S1/S2; RRR; no M/R/G; PMI non-displaced  Gastrointestinal: abdomen soft, NT/ND; no rebound or guarding; +BSx4  Genitourinary: strong in place draining clear yellow urine   Extremities: WWP, no clubbing or cyanosis; mild edema around R hip / thigh  Musculoskeletal: decreased ROM of RLE and R hip limited by pain, decreased dorsiflexion of R foot due to pain. LLE full ROM.   Vascular: 2+ radial, femoral, DP/PT pulses B/L  Lymphatic: no submandibular or cervical LAD  Neurologic: AAOx3; CNII-XII grossly intact; no focal deficits  Psychiatric: affect and characteristics of appearance, verbalizations, behaviors are appropriate    LABS:                        11.6   26.61 )-----------( 293      ( 29 May 2020 17:01 )             34.3         138  |  101  |  17  ----------------------------<  266<H>  3.8   |  26  |  0.78    Ca    9.4      29 May 2020 21:21  Mg     1.6         TPro  6.4  /  Alb  3.5  /  TBili  0.4  /  DBili  x   /  AST  19  /  ALT  24  /  AlkPhos  67      PT/INR - ( 29 May 2020 17:01 )   PT: 10.9 sec;   INR: 0.99          PTT - ( 29 May 2020 17:01 )  PTT:26.3 sec  Urinalysis Basic - ( 29 May 2020 22:05 )    Color: Yellow / Appearance: Clear / S.015 / pH: x  Gluc: x / Ketone: NEGATIVE  / Bili: NEGATIVE / Urobili: 0.2 E.U./dL   Blood: x / Protein: NEGATIVE mg/dL / Nitrite: NEGATIVE   Leuk Esterase: NEGATIVE / RBC: x / WBC x   Sq Epi: x / Non Sq Epi: x / Bacteria: x          CAPILLARY BLOOD GLUCOSE              RADIOLOGY & ADDITIONAL TESTS: Reviewed.

## 2020-05-30 NOTE — PACU DISCHARGE NOTE - COMMENTS
d/c to floor care in stable condition. In no distress and discomfort..Pitts X 4.. Neurovascular motor/sensory nerve intact and unchanged .. Tolerated blood Tx

## 2020-05-30 NOTE — CONSULT NOTE ADULT - PROBLEM SELECTOR RECOMMENDATION 4
Pt with elevated blood pressure on admission likely 2/2 to pain. Pt denies history of HTN and does not take medications BP Pt with elevated blood pressure on admission likely 2/2 to pain. Pt denies history of HTN and does not take medications BP     ADDENDUM likely pain related BP elevation; pain control; start bp med if remains elevated.

## 2020-05-31 LAB
ANION GAP SERPL CALC-SCNC: 10 MMOL/L — SIGNIFICANT CHANGE UP (ref 5–17)
BUN SERPL-MCNC: 21 MG/DL — SIGNIFICANT CHANGE UP (ref 7–23)
CALCIUM SERPL-MCNC: 8.2 MG/DL — LOW (ref 8.4–10.5)
CHLORIDE SERPL-SCNC: 100 MMOL/L — SIGNIFICANT CHANGE UP (ref 96–108)
CO2 SERPL-SCNC: 26 MMOL/L — SIGNIFICANT CHANGE UP (ref 22–31)
CREAT SERPL-MCNC: 0.76 MG/DL — SIGNIFICANT CHANGE UP (ref 0.5–1.3)
GLUCOSE BLDC GLUCOMTR-MCNC: 143 MG/DL — HIGH (ref 70–99)
GLUCOSE BLDC GLUCOMTR-MCNC: 170 MG/DL — HIGH (ref 70–99)
GLUCOSE BLDC GLUCOMTR-MCNC: 184 MG/DL — HIGH (ref 70–99)
GLUCOSE BLDC GLUCOMTR-MCNC: 225 MG/DL — HIGH (ref 70–99)
GLUCOSE SERPL-MCNC: 167 MG/DL — HIGH (ref 70–99)
HCT VFR BLD CALC: 21.2 % — LOW (ref 34.5–45)
HCT VFR BLD CALC: 27.7 % — LOW (ref 34.5–45)
HGB BLD-MCNC: 7.1 G/DL — LOW (ref 11.5–15.5)
HGB BLD-MCNC: 9 G/DL — LOW (ref 11.5–15.5)
MCHC RBC-ENTMCNC: 28.8 PG — SIGNIFICANT CHANGE UP (ref 27–34)
MCHC RBC-ENTMCNC: 29.2 PG — SIGNIFICANT CHANGE UP (ref 27–34)
MCHC RBC-ENTMCNC: 32.5 GM/DL — SIGNIFICANT CHANGE UP (ref 32–36)
MCHC RBC-ENTMCNC: 33.5 GM/DL — SIGNIFICANT CHANGE UP (ref 32–36)
MCV RBC AUTO: 87.2 FL — SIGNIFICANT CHANGE UP (ref 80–100)
MCV RBC AUTO: 88.8 FL — SIGNIFICANT CHANGE UP (ref 80–100)
NRBC # BLD: 0 /100 WBCS — SIGNIFICANT CHANGE UP (ref 0–0)
NRBC # BLD: 0 /100 WBCS — SIGNIFICANT CHANGE UP (ref 0–0)
PLATELET # BLD AUTO: 155 K/UL — SIGNIFICANT CHANGE UP (ref 150–400)
PLATELET # BLD AUTO: 172 K/UL — SIGNIFICANT CHANGE UP (ref 150–400)
POTASSIUM SERPL-MCNC: 4.6 MMOL/L — SIGNIFICANT CHANGE UP (ref 3.5–5.3)
POTASSIUM SERPL-SCNC: 4.6 MMOL/L — SIGNIFICANT CHANGE UP (ref 3.5–5.3)
RBC # BLD: 2.43 M/UL — LOW (ref 3.8–5.2)
RBC # BLD: 3.12 M/UL — LOW (ref 3.8–5.2)
RBC # FLD: 15.6 % — HIGH (ref 10.3–14.5)
RBC # FLD: 16.2 % — HIGH (ref 10.3–14.5)
SODIUM SERPL-SCNC: 136 MMOL/L — SIGNIFICANT CHANGE UP (ref 135–145)
WBC # BLD: 17.23 K/UL — HIGH (ref 3.8–10.5)
WBC # BLD: 17.6 K/UL — HIGH (ref 3.8–10.5)
WBC # FLD AUTO: 17.23 K/UL — HIGH (ref 3.8–10.5)
WBC # FLD AUTO: 17.6 K/UL — HIGH (ref 3.8–10.5)

## 2020-05-31 PROCEDURE — 99233 SBSQ HOSP IP/OBS HIGH 50: CPT

## 2020-05-31 RX ORDER — DIAZEPAM 5 MG
2 TABLET ORAL ONCE
Refills: 0 | Status: DISCONTINUED | OUTPATIENT
Start: 2020-05-31 | End: 2020-05-31

## 2020-05-31 RX ORDER — CEFAZOLIN SODIUM 1 G
2000 VIAL (EA) INJECTION ONCE
Refills: 0 | Status: COMPLETED | OUTPATIENT
Start: 2020-05-31 | End: 2020-05-31

## 2020-05-31 RX ADMIN — Medication 81 MILLIGRAM(S): at 17:02

## 2020-05-31 RX ADMIN — Medication 2 MILLIGRAM(S): at 22:31

## 2020-05-31 RX ADMIN — Medication 975 MILLIGRAM(S): at 14:23

## 2020-05-31 RX ADMIN — Medication 975 MILLIGRAM(S): at 22:03

## 2020-05-31 RX ADMIN — Medication 975 MILLIGRAM(S): at 07:26

## 2020-05-31 RX ADMIN — SODIUM CHLORIDE 100 MILLILITER(S): 9 INJECTION, SOLUTION INTRAVENOUS at 07:27

## 2020-05-31 RX ADMIN — Medication 81 MILLIGRAM(S): at 07:26

## 2020-05-31 RX ADMIN — Medication 100 MILLIGRAM(S): at 08:58

## 2020-05-31 RX ADMIN — OXYCODONE HYDROCHLORIDE 5 MILLIGRAM(S): 5 TABLET ORAL at 22:05

## 2020-05-31 RX ADMIN — Medication 2: at 11:56

## 2020-05-31 NOTE — PHYSICAL THERAPY INITIAL EVALUATION ADULT - PERTINENT HX OF CURRENT PROBLEM, REHAB EVAL
85F PMH DM, HTN presenting with R hip pain following mechanical fall. Pt states she was in a store going down a set of stairs when she slipped down the last few steps and fell onto her R hip. Found to have R IT fracture

## 2020-05-31 NOTE — PROGRESS NOTE ADULT - SUBJECTIVE AND OBJECTIVE BOX
SUBJECTIVE: Patient seen and examined.  Postop transfusion yesterday, vitals stable in AM, will need second unit this AM    OBJECTIVE:  NAD  Vital Signs Last 24 Hrs  T(C): 36.5 (31 May 2020 09:06), Max: 36.7 (30 May 2020 21:07)  T(F): 97.7 (31 May 2020 09:06), Max: 98.1 (31 May 2020 06:20)  HR: 77 (31 May 2020 09:06) (49 - 82)  BP: 114/61 (31 May 2020 09:06) (82/42 - 141/61)  BP(mean): 81 (30 May 2020 19:00) (58 - 83)  RR: 16 (31 May 2020 09:06) (12 - 28)  SpO2: 94% (31 May 2020 09:06) (94% - 100%)    Affected extremity:          Dressing: clean/dry/intact            Sensation: SILT         Motor exam: intact TA/GS/EHL         warm well perfused; capillary refill <3 seconds                         7.1    17.23 )-----------( 155      ( 31 May 2020 06:58 )             21.2     05-31    136  |  100  |  21  ----------------------------<  167<H>  4.6   |  26  |  0.76    Ca    8.2<L>      31 May 2020 06:58  Mg     1.6     05-29    TPro  6.4  /  Alb  3.5  /  TBili  0.4  /  DBili  x   /  AST  19  /  ALT  24  /  AlkPhos  67  05-29    A/P :  Pt is a 84yo Female s/p R femur IMN 5/30  -    Pain control  -    DVT ppx: SCD/ASA  -    Weight bearing status: WBAT   -    Physical Therapy  -    Dispo: EMELY Lyles MD  Senior Orthopaedic Resident  Orthopaedic Surgery

## 2020-05-31 NOTE — PROVIDER CONTACT NOTE (OTHER) - ASSESSMENT
pt VSS except . Pt is anxious, restless and agitated. Pt states hx of anxiety. Pt denies SOB, or chest pain.

## 2020-05-31 NOTE — PHYSICAL THERAPY INITIAL EVALUATION ADULT - LEVEL OF INDEPENDENCE: SIT/SUPINE, REHAB EVAL
minimum assist (75% patients effort)/maximum assist (25% patients effort)/Estefany at trunk, maxA at RLE

## 2020-05-31 NOTE — PHYSICAL THERAPY INITIAL EVALUATION ADULT - ADDITIONAL COMMENTS
Patient reports she lives alone in an apartment with a ramp to enter her building. She owns rolling walker, rollator, and cane.

## 2020-05-31 NOTE — PROGRESS NOTE ADULT - ASSESSMENT
85F w/ Type 2 DM (on metformin) w/ hx of L hip fx s/p L hemiarthroplasty (2017) that presents s/p mechanical fall    # Post-op: Stable.  Hgb low, s/p 1 uPRBC.  w/ leukocytosis which is downtrending.  BM regimen, IS, PT, pain control.    # DM II: FSG okay today (elevated yesterday), c/w ISS as inpatient.    # Blood loss anemia    # Leukocytosis

## 2020-05-31 NOTE — PROGRESS NOTE ADULT - SUBJECTIVE AND OBJECTIVE BOX
O/N Events: TRISTEN    Subjective/ROS: Denies HA, CP, SOB, n/v, changes in bowel/urinary habits.  12pt ROS otherwise negative.    VITALS  Vital Signs Last 24 Hrs  T(C): 36.5 (31 May 2020 09:06), Max: 36.7 (30 May 2020 21:07)  T(F): 97.7 (31 May 2020 09:06), Max: 98.1 (31 May 2020 06:20)  HR: 77 (31 May 2020 09:06) (49 - 82)  BP: 114/61 (31 May 2020 09:06) (82/42 - 141/61)  BP(mean): 81 (30 May 2020 19:00) (58 - 83)  RR: 16 (31 May 2020 09:06) (13 - 28)  SpO2: 94% (31 May 2020 09:06) (94% - 100%)    CAPILLARY BLOOD GLUCOSE      POCT Blood Glucose.: 170 mg/dL (31 May 2020 11:47)  POCT Blood Glucose.: 143 mg/dL (31 May 2020 07:58)  POCT Blood Glucose.: 218 mg/dL (30 May 2020 22:26)  POCT Blood Glucose.: 238 mg/dL (30 May 2020 18:27)  POCT Blood Glucose.: 219 mg/dL (30 May 2020 14:31)      PHYSICAL EXAM  General: A&Ox3; NAD  Head: NC/AT; MMM; PERRL; EOMI;  Neck: Supple; no JVD  Respiratory: CTA B/L; no wheezes/crackles   Cardiovascular: Regular rhythm/rate; S1/S2   Gastrointestinal: Soft; NTND; normoactive BS  Extremities: WWP; no edema/cyanosis; bandage on R hip  Neurological:  CNII-XII grossly intact; no obvious focal deficits    MEDICATIONS  (STANDING):  acetaminophen   Tablet .. 975 milliGRAM(s) Oral every 8 hours  aspirin enteric coated 81 milliGRAM(s) Oral two times a day  dextrose 5%. 1000 milliLiter(s) (50 mL/Hr) IV Continuous <Continuous>  dextrose 50% Injectable 12.5 Gram(s) IV Push once  dextrose 50% Injectable 25 Gram(s) IV Push once  insulin lispro (HumaLOG) corrective regimen sliding scale   SubCutaneous three times a day before meals  insulin lispro (HumaLOG) corrective regimen sliding scale   SubCutaneous at bedtime  lactated ringers. 500 milliLiter(s) (1000 mL/Hr) IV Continuous <Continuous>  lactated ringers. 1000 milliLiter(s) (100 mL/Hr) IV Continuous <Continuous>    MEDICATIONS  (PRN):  dextrose 40% Gel 15 Gram(s) Oral once PRN Blood Glucose LESS THAN 70 milliGRAM(s)/deciliter  glucagon  Injectable 1 milliGRAM(s) IntraMuscular once PRN Glucose LESS THAN 70 milligrams/deciliter  magnesium hydroxide Suspension 30 milliLiter(s) Oral daily PRN Constipation  ondansetron Injectable 4 milliGRAM(s) IV Push every 6 hours PRN Nausea and/or Vomiting  oxyCODONE    IR 5 milliGRAM(s) Oral every 4 hours PRN Severe Pain (7 - 10)  traMADol 50 milliGRAM(s) Oral every 4 hours PRN Moderate Pain (4 - 6)      No Known Allergies      LABS                        7.1    17.23 )-----------( 155      ( 31 May 2020 06:58 )             21.2         136  |  100  |  21  ----------------------------<  167<H>  4.6   |  26  |  0.76    Ca    8.2<L>      31 May 2020 06:58  Mg     1.6         TPro  6.4  /  Alb  3.5  /  TBili  0.4  /  DBili  x   /  AST  19  /  ALT  24  /  AlkPhos  67  -    PT/INR - ( 29 May 2020 17:01 )   PT: 10.9 sec;   INR: 0.99          PTT - ( 29 May 2020 17:01 )  PTT:26.3 sec  Urinalysis Basic - ( 29 May 2020 22:05 )    Color: Yellow / Appearance: Clear / S.015 / pH: x  Gluc: x / Ketone: NEGATIVE  / Bili: NEGATIVE / Urobili: 0.2 E.U./dL   Blood: x / Protein: NEGATIVE mg/dL / Nitrite: NEGATIVE   Leuk Esterase: NEGATIVE / RBC: x / WBC x   Sq Epi: x / Non Sq Epi: x / Bacteria: x            IMAGING/EKG/ETC: Reviewed

## 2020-05-31 NOTE — PHYSICAL THERAPY INITIAL EVALUATION ADULT - PLANNED THERAPY INTERVENTIONS, PT EVAL
bed mobility training/balance training/strengthening/transfer training/ROM/gait training/neuromuscular re-education

## 2020-05-31 NOTE — PROGRESS NOTE ADULT - SUBJECTIVE AND OBJECTIVE BOX
POD#1 s/p Right hip IMN. 2U given for acute blood loss anemia. Otherwise patient doing well. Denies CP/SOB/N/V.    PE:  Awake, alert, comfortable  RLE:  Dressing c/d/i  compartments soft NT/ND  EHL/FHl/TA/GS intact  S/S/T/SPn/dpn silt  BCR

## 2020-06-01 ENCOUNTER — OUTPATIENT (OUTPATIENT)
Dept: OUTPATIENT SERVICES | Facility: HOSPITAL | Age: 85
LOS: 1 days | End: 2020-06-01
Payer: MEDICARE

## 2020-06-01 ENCOUNTER — TRANSCRIPTION ENCOUNTER (OUTPATIENT)
Age: 85
End: 2020-06-01

## 2020-06-01 VITALS
RESPIRATION RATE: 19 BRPM | DIASTOLIC BLOOD PRESSURE: 65 MMHG | SYSTOLIC BLOOD PRESSURE: 170 MMHG | HEART RATE: 96 BPM | OXYGEN SATURATION: 100 % | TEMPERATURE: 98 F

## 2020-06-01 DIAGNOSIS — Z96.642 PRESENCE OF LEFT ARTIFICIAL HIP JOINT: Chronic | ICD-10-CM

## 2020-06-01 LAB
GLUCOSE BLDC GLUCOMTR-MCNC: 153 MG/DL — HIGH (ref 70–99)
GLUCOSE BLDC GLUCOMTR-MCNC: 198 MG/DL — HIGH (ref 70–99)

## 2020-06-01 PROCEDURE — 85610 PROTHROMBIN TIME: CPT

## 2020-06-01 PROCEDURE — 81003 URINALYSIS AUTO W/O SCOPE: CPT

## 2020-06-01 PROCEDURE — 85025 COMPLETE CBC W/AUTO DIFF WBC: CPT

## 2020-06-01 PROCEDURE — 97161 PT EVAL LOW COMPLEX 20 MIN: CPT

## 2020-06-01 PROCEDURE — 99285 EMERGENCY DEPT VISIT HI MDM: CPT | Mod: 25

## 2020-06-01 PROCEDURE — 86922 COMPATIBILITY TEST ANTIGLOB: CPT

## 2020-06-01 PROCEDURE — 83036 HEMOGLOBIN GLYCOSYLATED A1C: CPT

## 2020-06-01 PROCEDURE — 36430 TRANSFUSION BLD/BLD COMPNT: CPT

## 2020-06-01 PROCEDURE — 96375 TX/PRO/DX INJ NEW DRUG ADDON: CPT

## 2020-06-01 PROCEDURE — C1889: CPT

## 2020-06-01 PROCEDURE — 86901 BLOOD TYPING SEROLOGIC RH(D): CPT

## 2020-06-01 PROCEDURE — 82962 GLUCOSE BLOOD TEST: CPT

## 2020-06-01 PROCEDURE — 80048 BASIC METABOLIC PNL TOTAL CA: CPT

## 2020-06-01 PROCEDURE — C1769: CPT

## 2020-06-01 PROCEDURE — 85730 THROMBOPLASTIN TIME PARTIAL: CPT

## 2020-06-01 PROCEDURE — 71045 X-RAY EXAM CHEST 1 VIEW: CPT

## 2020-06-01 PROCEDURE — 86850 RBC ANTIBODY SCREEN: CPT

## 2020-06-01 PROCEDURE — 96374 THER/PROPH/DIAG INJ IV PUSH: CPT

## 2020-06-01 PROCEDURE — 80053 COMPREHEN METABOLIC PANEL: CPT

## 2020-06-01 PROCEDURE — 93005 ELECTROCARDIOGRAM TRACING: CPT

## 2020-06-01 PROCEDURE — C1713: CPT

## 2020-06-01 PROCEDURE — 96376 TX/PRO/DX INJ SAME DRUG ADON: CPT

## 2020-06-01 PROCEDURE — 72192 CT PELVIS W/O DYE: CPT

## 2020-06-01 PROCEDURE — 36415 COLL VENOUS BLD VENIPUNCTURE: CPT

## 2020-06-01 PROCEDURE — 99233 SBSQ HOSP IP/OBS HIGH 50: CPT | Mod: GC

## 2020-06-01 PROCEDURE — 97116 GAIT TRAINING THERAPY: CPT

## 2020-06-01 PROCEDURE — 76000 FLUOROSCOPY <1 HR PHYS/QHP: CPT

## 2020-06-01 PROCEDURE — 87635 SARS-COV-2 COVID-19 AMP PRB: CPT

## 2020-06-01 PROCEDURE — 72170 X-RAY EXAM OF PELVIS: CPT

## 2020-06-01 PROCEDURE — 70450 CT HEAD/BRAIN W/O DYE: CPT

## 2020-06-01 PROCEDURE — 83735 ASSAY OF MAGNESIUM: CPT

## 2020-06-01 PROCEDURE — 85027 COMPLETE CBC AUTOMATED: CPT

## 2020-06-01 PROCEDURE — P9016: CPT

## 2020-06-01 PROCEDURE — 73552 X-RAY EXAM OF FEMUR 2/>: CPT

## 2020-06-01 RX ORDER — TRAMADOL HYDROCHLORIDE 50 MG/1
1 TABLET ORAL
Qty: 0 | Refills: 0 | DISCHARGE
Start: 2020-06-01

## 2020-06-01 RX ORDER — ASPIRIN/CALCIUM CARB/MAGNESIUM 324 MG
1 TABLET ORAL
Qty: 0 | Refills: 0 | DISCHARGE
Start: 2020-06-01

## 2020-06-01 RX ORDER — ACETAMINOPHEN 500 MG
2 TABLET ORAL
Qty: 0 | Refills: 0 | DISCHARGE
Start: 2020-06-01

## 2020-06-01 RX ORDER — OXYCODONE HYDROCHLORIDE 5 MG/1
1 TABLET ORAL
Qty: 0 | Refills: 0 | DISCHARGE
Start: 2020-06-01

## 2020-06-01 RX ADMIN — Medication 2: at 12:29

## 2020-06-01 RX ADMIN — Medication 975 MILLIGRAM(S): at 06:27

## 2020-06-01 RX ADMIN — Medication 81 MILLIGRAM(S): at 06:27

## 2020-06-01 RX ADMIN — Medication 2: at 07:56

## 2020-06-01 RX ADMIN — Medication 975 MILLIGRAM(S): at 14:06

## 2020-06-01 RX ADMIN — OXYCODONE HYDROCHLORIDE 5 MILLIGRAM(S): 5 TABLET ORAL at 04:11

## 2020-06-01 NOTE — PROGRESS NOTE ADULT - SUBJECTIVE AND OBJECTIVE BOX
INTERVAL HPI/OVERNIGHT EVENTS: TRISTEN o/n. Pain is well controlled at rest, continues with     VITAL SIGNS:  T(F): 98.8 (06-01-20 @ 09:11)  HR: 89 (06-01-20 @ 09:11)  BP: 159/71 (06-01-20 @ 09:11)  RR: 20 (06-01-20 @ 09:11)  SpO2: 98% (06-01-20 @ 09:11)  Wt(kg): --    PHYSICAL EXAM:    Constitutional:  Eyes:  ENMT:  Neck:  Respiratory:  Cardiovascular:  Gastrointestinal:  Extremities:  Vascular:  Neurological:  Musculoskeletal:    MEDICATIONS  (STANDING):  acetaminophen   Tablet .. 975 milliGRAM(s) Oral every 8 hours  aspirin enteric coated 81 milliGRAM(s) Oral two times a day  dextrose 5%. 1000 milliLiter(s) (50 mL/Hr) IV Continuous <Continuous>  dextrose 50% Injectable 12.5 Gram(s) IV Push once  dextrose 50% Injectable 25 Gram(s) IV Push once  insulin lispro (HumaLOG) corrective regimen sliding scale   SubCutaneous three times a day before meals  insulin lispro (HumaLOG) corrective regimen sliding scale   SubCutaneous at bedtime  lactated ringers. 500 milliLiter(s) (1000 mL/Hr) IV Continuous <Continuous>  lactated ringers. 1000 milliLiter(s) (100 mL/Hr) IV Continuous <Continuous>    MEDICATIONS  (PRN):  bisacodyl Suppository 10 milliGRAM(s) Rectal daily PRN If no bowel movement  dextrose 40% Gel 15 Gram(s) Oral once PRN Blood Glucose LESS THAN 70 milliGRAM(s)/deciliter  glucagon  Injectable 1 milliGRAM(s) IntraMuscular once PRN Glucose LESS THAN 70 milligrams/deciliter  magnesium hydroxide Suspension 30 milliLiter(s) Oral daily PRN Constipation  ondansetron Injectable 4 milliGRAM(s) IV Push every 6 hours PRN Nausea and/or Vomiting  oxyCODONE    IR 5 milliGRAM(s) Oral every 4 hours PRN Severe Pain (7 - 10)  traMADol 50 milliGRAM(s) Oral every 4 hours PRN Moderate Pain (4 - 6)      Allergies    No Known Allergies    Intolerances        LABS:                        9.0    17.60 )-----------( 172      ( 31 May 2020 14:48 )             27.7     05-31    136  |  100  |  21  ----------------------------<  167<H>  4.6   |  26  |  0.76    Ca    8.2<L>      31 May 2020 06:58            RADIOLOGY & ADDITIONAL TESTS: INTERVAL HPI/OVERNIGHT EVENTS: TRISTEN o/n. Pain is well controlled at rest, pain is present with ambulation. No nausea or vomiting. She is voiding spontaneously, passing gas. No bowel movements. She lacks appetite but is tolerating PO okay. Was light headed when dangling from bedside.     VITAL SIGNS:  T(F): 98.8 (06-01-20 @ 09:11)  HR: 89 (06-01-20 @ 09:11)  BP: 159/71 (06-01-20 @ 09:11)  RR: 20 (06-01-20 @ 09:11)  SpO2: 98% (06-01-20 @ 09:11)  Wt(kg): --    PHYSICAL EXAM:      Constitutional: NAD, well-groomed, well-developed  HEENT: PERRLA, EOMI, Normal Hearing, MMM  Neck: No LAD, No JVD  Back: Normal spine flexure, No CVA tenderness  Respiratory: CTAB  Cardiovascular: S1 and S2, RRR, no M/G/R  Gastrointestinal: BS+, soft, NT/ND  Extremities: right thigh with three dressings on lateral aspect of thigh and hip c/d/i. ttp. limited rom due to pain. no distal edema b/l.   Vascular: 2+ peripheral pulses. brisk capillary refill   Neurological: A/O x 3, no focal deficits  Psychiatric: Normal mood, normal affect  Musculoskeletal: rom limited by pain   Skin: No rashes        MEDICATIONS  (STANDING):  acetaminophen   Tablet .. 975 milliGRAM(s) Oral every 8 hours  aspirin enteric coated 81 milliGRAM(s) Oral two times a day  dextrose 5%. 1000 milliLiter(s) (50 mL/Hr) IV Continuous <Continuous>  dextrose 50% Injectable 12.5 Gram(s) IV Push once  dextrose 50% Injectable 25 Gram(s) IV Push once  insulin lispro (HumaLOG) corrective regimen sliding scale   SubCutaneous three times a day before meals  insulin lispro (HumaLOG) corrective regimen sliding scale   SubCutaneous at bedtime  lactated ringers. 500 milliLiter(s) (1000 mL/Hr) IV Continuous <Continuous>  lactated ringers. 1000 milliLiter(s) (100 mL/Hr) IV Continuous <Continuous>    MEDICATIONS  (PRN):  bisacodyl Suppository 10 milliGRAM(s) Rectal daily PRN If no bowel movement  dextrose 40% Gel 15 Gram(s) Oral once PRN Blood Glucose LESS THAN 70 milliGRAM(s)/deciliter  glucagon  Injectable 1 milliGRAM(s) IntraMuscular once PRN Glucose LESS THAN 70 milligrams/deciliter  magnesium hydroxide Suspension 30 milliLiter(s) Oral daily PRN Constipation  ondansetron Injectable 4 milliGRAM(s) IV Push every 6 hours PRN Nausea and/or Vomiting  oxyCODONE    IR 5 milliGRAM(s) Oral every 4 hours PRN Severe Pain (7 - 10)  traMADol 50 milliGRAM(s) Oral every 4 hours PRN Moderate Pain (4 - 6)      Allergies    No Known Allergies    Intolerances        LABS:                        9.0    17.60 )-----------( 172      ( 31 May 2020 14:48 )             27.7     05-31    136  |  100  |  21  ----------------------------<  167<H>  4.6   |  26  |  0.76    Ca    8.2<L>      31 May 2020 06:58            RADIOLOGY & ADDITIONAL TESTS:

## 2020-06-01 NOTE — PROGRESS NOTE ADULT - SUBJECTIVE AND OBJECTIVE BOX
SUBJECTIVE: Patient seen and examined.  c/o urinary retention    OBJECTIVE:  NAD  Vital Signs Last 24 Hrs  T(C): 37.1 (01 Jun 2020 09:11), Max: 37.2 (01 Jun 2020 06:24)  T(F): 98.8 (01 Jun 2020 09:11), Max: 99 (01 Jun 2020 06:24)  HR: 89 (01 Jun 2020 09:11) (68 - 104)  BP: 159/71 (01 Jun 2020 09:11) (124/63 - 159/71)  BP(mean): --  RR: 20 (01 Jun 2020 09:11) (16 - 20)  SpO2: 98% (01 Jun 2020 09:11) (94% - 98%)    Affected extremity:          Dressing clean/dry/intact            Sensation: SILT         Motor exam: intact TA/GS/EHL         warm well perfused; capillary refill <3 seconds                         9.0    17.60 )-----------( 172      ( 31 May 2020 14:48 )             27.7     05-31    136  |  100  |  21  ----------------------------<  167<H>  4.6   |  26  |  0.76    Ca    8.2<L>      31 May 2020 06:58      A/P :  Pt is a 84yo Female s/p  R IMN femur 5/30  -    Pain control  -    DVT ppx: SCD/ASA  -    Weight bearing status: WBAT   -    Physical Therapy  -    Dispo: EMELY Lylse MD  Senior Orthopaedic Resident  Orthopaedic Surgery

## 2020-06-01 NOTE — DISCHARGE NOTE PROVIDER - NSDCFUADDINST_GEN_ALL_CORE_FT
No strenuous activity, heavy lifting, driving or returning to work until cleared by MD.  You may shower - dressing is water-resistant, no soaking in bathtubs.  Remove dressing after post op day 5-7, then leave incision open to air. Keep incision clean and dry.  Try to have regular bowel movements, take stool softener or laxative if necessary.  May take pepcid or zantac for upset stomach.    Swelling may travel all the way down leg to foot, this is normal and will subside in a few weeks.  Follow up with Dr. Hernandez to schedule an appt, if you have staples or sutures they will be removed in office.  Contact your doctor if you experience: fever greater than 101.5, chills, chest pain, difficulty breathing, redness or excessive drainage around the incision, other concerns.

## 2020-06-01 NOTE — DISCHARGE NOTE PROVIDER - CARE PROVIDER_API CALL
Terrance Hernandez  [facility]  [address] [place] [address], [place] [address]  Phone: [phone]  Fax: [phone]  Follow Up Time:

## 2020-06-01 NOTE — DISCHARGE NOTE NURSING/CASE MANAGEMENT/SOCIAL WORK - PATIENT PORTAL LINK FT
You can access the FollowMyHealth Patient Portal offered by [facility] by registering at the following website: [email or web address]. By joining [facility]’s FollowMyHealth portal, you will also be able to view your health information using other applications (apps) compatible with our system.

## 2020-06-01 NOTE — DISCHARGE NOTE PROVIDER - HOSPITAL COURSE
Admit to Orthopaedics for right hip intramedullary nail     Perioperative Antibiotics    DVT prophylaxis    Physical Therapy    Pain Management

## 2020-06-01 NOTE — DISCHARGE NOTE PROVIDER - NSDCMRMEDTOKEN_GEN_ALL_CORE_FT
acetaminophen 325 mg oral tablet: 3 tab(s) orally every 8 hours  aspirin 81 mg oral delayed release tablet: 1 tab(s) orally 2 times a day  bisacodyl 10 mg rectal suppository: 1 suppository(ies) rectal once a day, As needed, If no bowel movement  oxyCODONE 5 mg oral tablet: 1 tab(s) orally every 4 hours, As needed, Severe Pain (7 - 10)  Pyridium 200 mg oral tablet: 1 tab(s) orally 2 times a day   Pyridium 200 mg oral tablet: 1 tab(s) orally 3 times a day (after meals)   traMADol 50 mg oral tablet: 1 tab(s) orally every 4 hours, As needed, Moderate Pain (4 - 6)

## 2020-06-01 NOTE — PROGRESS NOTE ADULT - SUBJECTIVE AND OBJECTIVE BOX
Orthopaedic Surgery Progress Note    Pt POD #2 s/p right hip IMN. Patient seen and examined. TRISTEN. Patient without complaints. Pain controlled. Denies CP, SOB, N/V, tactile fevers, calf pain.  Pt transfused 1 unit PRBCs yesterday, Hgb responded appropriately and is stable. Denies palpitations, dizziness.      Vital Signs Last 24 Hrs  T(C): 37.1 (01 Jun 2020 09:11), Max: 37.2 (01 Jun 2020 06:24)  T(F): 98.8 (01 Jun 2020 09:11), Max: 99 (01 Jun 2020 06:24)  HR: 89 (01 Jun 2020 09:11) (68 - 104)  BP: 159/71 (01 Jun 2020 09:11) (124/63 - 159/71)  BP(mean): --  RR: 20 (01 Jun 2020 09:11) (16 - 20)  SpO2: 98% (01 Jun 2020 09:11) (94% - 98%)         CAPILLARY BLOOD GLUCOSE      POCT Blood Glucose.: 153 mg/dL (01 Jun 2020 07:28)  POCT Blood Glucose.: 225 mg/dL (31 May 2020 21:56)  POCT Blood Glucose.: 184 mg/dL (31 May 2020 17:56)  POCT Blood Glucose.: 170 mg/dL (31 May 2020 11:47)                  Physical Exam:  Pt laying comfortably in bed, NAD.  Skin warm and well perfused, no visible erythema/ecchymoses.  Dressing C/D/I  EHL/FHL/TA/GS firing bilaterally  SLT in tact to distal bilateral lower extremities  DP pulses 2+ bilaterally     LABS                        9.0    17.60 )-----------( 172      ( 31 May 2020 14:48 )             27.7                                05-31    136  |  100  |  21  ----------------------------<  167<H>  4.6   |  26  |  0.76    Ca    8.2<L>      31 May 2020 06:58          A/P: 85F POD #2 s/p right hip IMN    CONTINUE:        1. PT: WBAT, eval for EEMLY   2. DVT prophylaxis: ASA 81 BID   3. Pain Control as needed   4. Dispo: EMELY  5. Medicine recs appreciated Orthopaedic Surgery Progress Note    Pt POD #2 s/p right hip IMN. Patient seen and examined. TRISTEN. Patient without complaints. Pain controlled. Denies CP, SOB, N/V, tactile fevers, calf pain.  Pt transfused 1 unit PRBCs yesterday, Hgb responded appropriately and is stable. Denies palpitations, dizziness.      Vital Signs Last 24 Hrs  T(C): 37.1 (01 Jun 2020 09:11), Max: 37.2 (01 Jun 2020 06:24)  T(F): 98.8 (01 Jun 2020 09:11), Max: 99 (01 Jun 2020 06:24)  HR: 89 (01 Jun 2020 09:11) (68 - 104)  BP: 159/71 (01 Jun 2020 09:11) (124/63 - 159/71)  BP(mean): --  RR: 20 (01 Jun 2020 09:11) (16 - 20)  SpO2: 98% (01 Jun 2020 09:11) (94% - 98%)         CAPILLARY BLOOD GLUCOSE      POCT Blood Glucose.: 153 mg/dL (01 Jun 2020 07:28)  POCT Blood Glucose.: 225 mg/dL (31 May 2020 21:56)  POCT Blood Glucose.: 184 mg/dL (31 May 2020 17:56)  POCT Blood Glucose.: 170 mg/dL (31 May 2020 11:47)                  Physical Exam:  Pt laying comfortably in bed, NAD.  Skin warm and well perfused, no visible erythema/ecchymoses.  Dressing C/D/I  EHL/FHL/TA/GS firing bilaterally  SLT in tact to distal bilateral lower extremities  Calves soft and nontender to palpation bilaterally   DP pulses 2+ bilaterally     LABS                        9.0    17.60 )-----------( 172      ( 31 May 2020 14:48 )             27.7                                05-31    136  |  100  |  21  ----------------------------<  167<H>  4.6   |  26  |  0.76    Ca    8.2<L>      31 May 2020 06:58          A/P: 85F POD #2 s/p right hip IMN    CONTINUE:        1. PT: WBAT, eval for EMELY   2. DVT prophylaxis: ASA 81 BID   3. Pain Control as needed   4. Dispo: EMELY  5. Medicine recs appreciated

## 2020-06-01 NOTE — PROGRESS NOTE ADULT - ASSESSMENT
85F w/ Type 2 DM (on metformin) w/ hx of L hip fx s/p L hemiarthroplasty (2017) that presents s/p mechanical fall. POD 1     # Post-op: Stable.    - Pain: currently well controlled at rest. Recommend pre medication prior to physical therapy.   - OOB to chair as weight bearing status allows, caution with orthostatic hypotension   - obtain orthostatic blood pressure readings   - Bowel regimen with senna and miralax   - DVT prophylaxis with heparin   - PT   - incentive spirometry 10 x hr. encouraged and educated on appropriate use.    # DM II:  - FSG currently at goal < 180  - c/w ISS     # Blood loss anemia  - Normocytic, likely due to acute blood loss anemia from intraoperative losses.   - stable, no significant change.   - no hematochezia, hematuria, hematemesis, melena or hemoptysis noted     # Leukocytosis  - Likely reactive 2/2 fracture and surgery. No signs or symptoms of systemic infection at this time. Dressings are c/d/i on right thigh 85F w/ Type 2 DM (on metformin) w/ hx of L hip fx s/p L hemiarthroplasty (2017) that presents s/p mechanical fall. POD 1     # Post-op: Stable.    - Pain: currently well controlled at rest. Recommend pre medication prior to physical therapy.   - OOB to chair as weight bearing status allows, caution with orthostatic hypotension   - obtain orthostatic blood pressure readings   - Bowel regimen with senna and miralax   - DVT prophylaxis with heparin   - PT   - incentive spirometry 10 x hr. encouraged and educated on appropriate use.    # DM II:  - FSG currently at goal < 180  - c/w ISS     #elevated blood pressure   - likely due to pain and anxiety. normotensive during earlier portion of admit   - monitor for persistently elevated BP. no indication for pharm intervention currently     # Blood loss anemia  - Normocytic, likely due to acute blood loss anemia from intraoperative losses.   - stable, no significant change.   - no hematochezia, hematuria, hematemesis, melena or hemoptysis noted     # Leukocytosis  - Likely reactive 2/2 fracture and surgery. No signs or symptoms of systemic infection at this time. Dressings are c/d/i on right thigh

## 2020-06-04 DIAGNOSIS — M25.559 PAIN IN UNSPECIFIED HIP: ICD-10-CM

## 2020-06-04 DIAGNOSIS — R03.0 ELEVATED BLOOD-PRESSURE READING, WITHOUT DIAGNOSIS OF HYPERTENSION: ICD-10-CM

## 2020-06-04 DIAGNOSIS — D62 ACUTE POSTHEMORRHAGIC ANEMIA: ICD-10-CM

## 2020-06-04 DIAGNOSIS — D72.829 ELEVATED WHITE BLOOD CELL COUNT, UNSPECIFIED: ICD-10-CM
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
## 2020-06-04 DIAGNOSIS — E11.9 TYPE 2 DIABETES MELLITUS WITHOUT COMPLICATIONS: ICD-10-CM

## 2020-06-04 DIAGNOSIS — S72.141A DISPLACED INTERTROCHANTERIC FRACTURE OF RIGHT FEMUR, INITIAL ENCOUNTER FOR CLOSED FRACTURE: ICD-10-CM
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
## 2020-06-04 DIAGNOSIS — R33.9 RETENTION OF URINE, UNSPECIFIED: ICD-10-CM

## 2020-06-04 DIAGNOSIS — Z71.89 OTHER SPECIFIED COUNSELING: ICD-10-CM

## 2020-06-09 ENCOUNTER — APPOINTMENT (OUTPATIENT)
Dept: ORTHOPEDIC SURGERY | Facility: CLINIC | Age: 85
End: 2020-06-09
Payer: MEDICARE

## 2020-06-09 PROCEDURE — 99024 POSTOP FOLLOW-UP VISIT: CPT

## 2020-06-26 ENCOUNTER — APPOINTMENT (OUTPATIENT)
Dept: INTERNAL MEDICINE | Facility: CLINIC | Age: 85
End: 2020-06-26
Payer: MEDICARE

## 2020-06-26 DIAGNOSIS — B02.9 ZOSTER W/OUT COMPLICATIONS: ICD-10-CM

## 2020-06-26 PROCEDURE — 99495 TRANSJ CARE MGMT MOD F2F 14D: CPT

## 2020-07-22 ENCOUNTER — APPOINTMENT (OUTPATIENT)
Dept: INTERNAL MEDICINE | Facility: CLINIC | Age: 85
End: 2020-07-22
Payer: MEDICARE

## 2020-07-22 VITALS
HEART RATE: 77 BPM | OXYGEN SATURATION: 97 % | TEMPERATURE: 98.2 F | SYSTOLIC BLOOD PRESSURE: 147 MMHG | DIASTOLIC BLOOD PRESSURE: 71 MMHG

## 2020-07-22 PROCEDURE — 99214 OFFICE O/P EST MOD 30 MIN: CPT | Mod: 25

## 2020-07-22 PROCEDURE — 36415 COLL VENOUS BLD VENIPUNCTURE: CPT

## 2020-07-22 RX ORDER — ADHESIVE TAPE 3"X 2.3 YD
50 MCG TAPE, NON-MEDICATED TOPICAL
Qty: 90 | Refills: 3 | Status: DISCONTINUED | COMMUNITY
Start: 2018-01-12 | End: 2020-07-22

## 2020-07-23 LAB
25(OH)D3 SERPL-MCNC: 54.5 NG/ML
ALBUMIN SERPL ELPH-MCNC: 4.4 G/DL
ALP BLD-CCNC: 120 U/L
ALT SERPL-CCNC: 7 U/L
ANION GAP SERPL CALC-SCNC: 16 MMOL/L
AST SERPL-CCNC: 13 U/L
BASOPHILS # BLD AUTO: 0.04 K/UL
BASOPHILS NFR BLD AUTO: 0.5 %
BILIRUB SERPL-MCNC: 0.3 MG/DL
BUN SERPL-MCNC: 15 MG/DL
CALCIUM SERPL-MCNC: 9.8 MG/DL
CHLORIDE SERPL-SCNC: 99 MMOL/L
CO2 SERPL-SCNC: 24 MMOL/L
CREAT SERPL-MCNC: 0.67 MG/DL
EOSINOPHIL # BLD AUTO: 0.17 K/UL
EOSINOPHIL NFR BLD AUTO: 2 %
ESTIMATED AVERAGE GLUCOSE: 146 MG/DL
GLUCOSE SERPL-MCNC: 256 MG/DL
HBA1C MFR BLD HPLC: 6.7 %
HCT VFR BLD CALC: 36.9 %
HGB BLD-MCNC: 11.1 G/DL
IMM GRANULOCYTES NFR BLD AUTO: 0.2 %
LYMPHOCYTES # BLD AUTO: 1.54 K/UL
LYMPHOCYTES NFR BLD AUTO: 17.8 %
MAN DIFF?: NORMAL
MCHC RBC-ENTMCNC: 29 PG
MCHC RBC-ENTMCNC: 30.1 GM/DL
MCV RBC AUTO: 96.3 FL
MONOCYTES # BLD AUTO: 0.5 K/UL
MONOCYTES NFR BLD AUTO: 5.8 %
NEUTROPHILS # BLD AUTO: 6.4 K/UL
NEUTROPHILS NFR BLD AUTO: 73.7 %
PLATELET # BLD AUTO: 283 K/UL
POTASSIUM SERPL-SCNC: 4.6 MMOL/L
PROT SERPL-MCNC: 6.4 G/DL
RBC # BLD: 3.83 M/UL
RBC # FLD: 17.1 %
SODIUM SERPL-SCNC: 138 MMOL/L
WBC # FLD AUTO: 8.67 K/UL

## 2020-08-14 ENCOUNTER — EMERGENCY (EMERGENCY)
Facility: HOSPITAL | Age: 85
LOS: 1 days | Discharge: HOME | End: 2020-08-14
Attending: EMERGENCY MEDICINE | Admitting: EMERGENCY MEDICINE
Payer: MEDICARE

## 2020-08-14 VITALS
RESPIRATION RATE: 18 BRPM | WEIGHT: 130.07 LBS | HEART RATE: 76 BPM | OXYGEN SATURATION: 97 % | DIASTOLIC BLOOD PRESSURE: 69 MMHG | SYSTOLIC BLOOD PRESSURE: 182 MMHG | TEMPERATURE: 99 F

## 2020-08-14 VITALS
TEMPERATURE: 98 F | RESPIRATION RATE: 16 BRPM | OXYGEN SATURATION: 99 % | HEART RATE: 70 BPM | DIASTOLIC BLOOD PRESSURE: 74 MMHG | SYSTOLIC BLOOD PRESSURE: 167 MMHG

## 2020-08-14 DIAGNOSIS — Z96.642 PRESENCE OF LEFT ARTIFICIAL HIP JOINT: Chronic | ICD-10-CM

## 2020-08-14 PROCEDURE — 99284 EMERGENCY DEPT VISIT MOD MDM: CPT

## 2020-08-14 PROCEDURE — 93970 EXTREMITY STUDY: CPT | Mod: 26

## 2020-08-14 RX ORDER — LANOLIN/MINERAL OIL
1 LOTION (ML) TOPICAL
Qty: 1 | Refills: 1
Start: 2020-08-14

## 2020-08-14 RX ORDER — ACETAMINOPHEN 500 MG
975 TABLET ORAL ONCE
Refills: 0 | Status: COMPLETED | OUTPATIENT
Start: 2020-08-14 | End: 2020-08-14

## 2020-08-14 NOTE — ED PROVIDER NOTE - NSFOLLOWUPINSTRUCTIONS_ED_ALL_ED_FT
Please stop using the clotrimazole/beclomethasone cream.     Please use the barrier cream- Coloplast instead. 2-3 times per day.    You seem to have dry skin.    You should follow up with one of our vascular MDs as well as Dr. Sanchez the dermatologist.    Return to the ER for any worsening symptoms.

## 2020-08-14 NOTE — ED ADULT NURSE NOTE - OBJECTIVE STATEMENT
Patient, 86 yo, female, came to this unit c/o tenderness and swelling in her right ankle/foot. Pt. has hx of hip surgery 05/29 + DM. Pt. A&O x 3,  full range movement in all extremities, no apparent distress at this moment. Pt. denies fever, sob, chest pain. Pt. with comfortable breathing pattern. Pt. continue to monitor. Patient, 84 yo, female, came to this unit c/o tenderness and swelling in her left ankle/foot. Pt. has hx of hip surgery 05/29 + DM. Pt. A&O x 3,  full range movement in all extremities, no apparent distress at this moment. Pt. denies fever, sob, chest pain. Pt. with comfortable breathing pattern. Pt. continue to monitor.

## 2020-08-14 NOTE — ED PROVIDER NOTE - CLINICAL SUMMARY MEDICAL DECISION MAKING FREE TEXT BOX
Patient presenting with leg swelling since hip surgery and painful LE. Unclear whether this is neuropathy, very dry skin, and/or a vascular condition. Will give referral to dermatology and vascular. Will check LE US here and have her switch from antifungal/steroid cream to barrier cream to improve hydration.

## 2020-08-14 NOTE — ED ADULT TRIAGE NOTE - CHIEF COMPLAINT QUOTE
s/p right hip surgery 5/29 c/o right leg swelling and ble redness. ambulatory with fww at baseline. denies any fevers but adds swelling and pain wakes her up at night.

## 2020-08-14 NOTE — ED ADULT NURSE NOTE - NSIMPLEMENTINTERV_GEN_ALL_ED
Implemented All Universal Safety Interventions:  [place] to call system. Call bell, personal items and telephone within reach. Instruct patient to call for assistance. Room bathroom lighting operational. Non-slip footwear when patient is off stretcher. Physically safe environment: no spills, clutter or unnecessary equipment. Stretcher in lowest position, wheels locked, appropriate side rails in place. Implemented All Fall Risk Interventions:  [place] to call system. Call bell, personal items and telephone within reach. Instruct patient to call for assistance. Room bathroom lighting operational. Non-slip footwear when patient is off stretcher. Physically safe environment: no spills, clutter or unnecessary equipment. Stretcher in lowest position, wheels locked, appropriate side rails in place. Provide visual cue, wrist band, yellow gown, etc. Monitor gait and stability. Monitor for mental status changes and reorient to person, place, and time. Review medications for side effects contributing to fall risk. Reinforce activity limits and safety measures with patient and family.

## 2020-08-14 NOTE — ED PROVIDER NOTE - OBJECTIVE STATEMENT
86 y/o female with PMHx of bilateral hip fracture and repair (most recent fracture was on 5/29/2020 with fracture to right hip, treated at [facility] and had 3 week stay at [facility] after that). Since then, Patient endorses R>L swelling, which improved over time. Also has bilateral foot pain associated with dry and burning skin and slight skin redness that is L>R. Her PMD Dr. Das prescribed her a combined steroid and antifungal cream with no resolution in symptoms. Denies fever or chills.

## 2020-08-14 NOTE — ED PROVIDER NOTE - SKIN, MLM
Very dry skin at both lower legs and feet with thinning of skin and decreased hair growth. Skin in not warm to touch. No signs of cellulitis.

## 2020-08-14 NOTE — ED PROVIDER NOTE - CARE PROVIDER_API CALL
Coni Alicea  VASCULAR SURGERY  [address] [place] [address]  Phone: [phone]  Fax: [phone]  Follow Up Time:     Lydia Sanchez)  Dermatology  [address] [place] [address], [place] [address]  Phone: [phone]  Fax: [phone]  Follow Up Time:

## 2020-08-14 NOTE — ED ADULT NURSE NOTE - CHPI ED NUR SYMPTOMS NEG
no chills/no tingling/no decreased eating/drinking/no dizziness/no vomiting/no weakness/no fever/no nausea

## 2020-08-14 NOTE — ED PROVIDER NOTE - CARE PROVIDERS DIRECT ADDRESSES
[email or web address]

## 2020-08-14 NOTE — ED PROVIDER NOTE - PATIENT PORTAL LINK FT
You can access the FollowMyHealth Patient Portal offered by [facility] by registering at the following website: [email or web address]. By joining "[employer]’s FollowMyHealth portal, you will also be able to view your health information using other applications (apps) compatible with our system.

## 2020-08-16 ENCOUNTER — RX RENEWAL (OUTPATIENT)
Age: 85
End: 2020-08-16

## 2020-08-18 DIAGNOSIS — M79.89 OTHER SPECIFIED SOFT TISSUE DISORDERS: ICD-10-CM

## 2020-08-18 DIAGNOSIS — L85.3 XEROSIS CUTIS: ICD-10-CM

## 2020-08-27 ENCOUNTER — APPOINTMENT (OUTPATIENT)
Dept: VASCULAR SURGERY | Facility: CLINIC | Age: 85
End: 2020-08-27
Payer: MEDICARE

## 2020-08-27 VITALS
TEMPERATURE: 98 F | DIASTOLIC BLOOD PRESSURE: 70 MMHG | OXYGEN SATURATION: 98 % | SYSTOLIC BLOOD PRESSURE: 149 MMHG | HEART RATE: 69 BPM

## 2020-08-27 DIAGNOSIS — I83.893 VARICOSE VEINS OF BILATERAL LOWER EXTREMITIES WITH OTHER COMPLICATIONS: ICD-10-CM

## 2020-08-27 DIAGNOSIS — M79.89 OTHER SPECIFIED SOFT TISSUE DISORDERS: ICD-10-CM

## 2020-08-27 PROCEDURE — 93970 EXTREMITY STUDY: CPT

## 2020-08-27 PROCEDURE — 99203 OFFICE O/P NEW LOW 30 MIN: CPT | Mod: 25

## 2020-08-27 NOTE — HISTORY OF PRESENT ILLNESS
[FreeTextEntry1] : Pt has hx of DM and takes Metformin and diuretic, as per Dr Garrett. She is active and ambulatory but uses a walking device for assistance. She has tried support hose in the past but does not like them and does not think she will be compliant with using them daily. \par \par VLE today shows no dvt no svt no venous reflux; has small rt knee bakers cyst. \par Discussed with her the importance of compression hose, elevation, exercise and continuing to take her diuretic as prescribed. She will followup in one month.

## 2020-08-27 NOTE — PROCEDURE
[FreeTextEntry1] : as above - daily support hose, elevation, diuretics as prescribed by Dr Garrett - followup one month

## 2020-08-27 NOTE — DATA REVIEWED
[FreeTextEntry1] : RVALMA Canela performed b/l vle - no dvt no svt no truncal reflux +small rt bakers cyst + b/l edema at ankles and lower legs

## 2020-08-27 NOTE — REASON FOR VISIT
[Initial Evaluation] : an initial evaluation [FreeTextEntry1] : Elderly woman sent by Dr Garrett to evaluate her b/l indurated and brawny edema lower extremities.

## 2020-08-27 NOTE — PHYSICAL EXAM
[2+] : right 2+ [Ankle Swelling (On Exam)] : present [Ankle Swelling Bilaterally] : severe [No Rash or Lesion] : No rash or lesion [Skin Induration] : induration [Alert] : alert [Oriented to Person] : oriented to person [Oriented to Place] : oriented to place [Oriented to Time] : oriented to time [Calm] : calm [JVD] : no jugular venous distention  [Varicose Veins Of Lower Extremities] : not present [Purpura] : no purpura  [] : not present [Petechiae] : no petechiae [Skin Ulcer] : no ulcer [FreeTextEntry1] : brawny indurated edema both ankles, no varicose veins, no ulcers, intact pulses, no cellulitis [de-identified] : wnl [de-identified] : elderly thin nad [de-identified] : as above [de-identified] : wnl

## 2020-08-28 ENCOUNTER — APPOINTMENT (OUTPATIENT)
Dept: VASCULAR SURGERY | Facility: CLINIC | Age: 85
End: 2020-08-28

## 2020-10-01 ENCOUNTER — APPOINTMENT (OUTPATIENT)
Dept: VASCULAR SURGERY | Facility: CLINIC | Age: 85
End: 2020-10-01

## 2020-10-11 ENCOUNTER — RX RENEWAL (OUTPATIENT)
Age: 85
End: 2020-10-11

## 2020-10-14 ENCOUNTER — APPOINTMENT (OUTPATIENT)
Dept: DERMATOLOGY | Facility: CLINIC | Age: 85
End: 2020-10-14
Payer: MEDICARE

## 2020-10-14 VITALS — TEMPERATURE: 97.2 F

## 2020-10-14 PROCEDURE — 99213 OFFICE O/P EST LOW 20 MIN: CPT

## 2020-10-14 NOTE — HISTORY OF PRESENT ILLNESS
[FreeTextEntry1] : leg swelling [de-identified] : 84 yo F hx NMSC here for above\par refusing CBE for NMSC\par leg swelling x few years but now with hardening and pink discoloration of legs, also reports stinging in feet\par recently saw Dr Alicea - recommended compression and elevation

## 2020-10-14 NOTE — PHYSICAL EXAM
[Alert] : alert [Oriented x 3] : ~L oriented x 3 [Well Nourished] : well nourished [Conjunctiva Non-injected] : conjunctiva non-injected [No Visual Lymphadenopathy] : no visual  lymphadenopathy [No Clubbing] : no clubbing [No Edema] : no edema [No Bromhidrosis] : no bromhidrosis [No Chromhidrosis] : no chromhidrosis [FreeTextEntry3] : pitting edema, erythema of BLE with induration in inverted champagne bottle distribution

## 2020-10-21 ENCOUNTER — APPOINTMENT (OUTPATIENT)
Dept: INTERNAL MEDICINE | Facility: CLINIC | Age: 85
End: 2020-10-21

## 2020-10-29 ENCOUNTER — APPOINTMENT (OUTPATIENT)
Dept: INTERNAL MEDICINE | Facility: CLINIC | Age: 85
End: 2020-10-29
Payer: MEDICARE

## 2020-10-29 VITALS
HEIGHT: 60 IN | HEART RATE: 77 BPM | TEMPERATURE: 97.5 F | DIASTOLIC BLOOD PRESSURE: 75 MMHG | BODY MASS INDEX: 24.35 KG/M2 | OXYGEN SATURATION: 99 % | SYSTOLIC BLOOD PRESSURE: 119 MMHG | WEIGHT: 124 LBS

## 2020-10-29 PROCEDURE — 99072 ADDL SUPL MATRL&STAF TM PHE: CPT

## 2020-10-29 PROCEDURE — 99213 OFFICE O/P EST LOW 20 MIN: CPT | Mod: 25

## 2020-10-29 PROCEDURE — 36415 COLL VENOUS BLD VENIPUNCTURE: CPT

## 2020-10-30 LAB
ALBUMIN SERPL ELPH-MCNC: 4.5 G/DL
ALP BLD-CCNC: 107 U/L
ALT SERPL-CCNC: 9 U/L
ANION GAP SERPL CALC-SCNC: 12 MMOL/L
AST SERPL-CCNC: 13 U/L
BILIRUB SERPL-MCNC: 0.3 MG/DL
BUN SERPL-MCNC: 20 MG/DL
CALCIUM SERPL-MCNC: 9.6 MG/DL
CHLORIDE SERPL-SCNC: 101 MMOL/L
CO2 SERPL-SCNC: 27 MMOL/L
CREAT SERPL-MCNC: 0.82 MG/DL
ESTIMATED AVERAGE GLUCOSE: 177 MG/DL
GLUCOSE SERPL-MCNC: 156 MG/DL
HBA1C MFR BLD HPLC: 7.8 %
POTASSIUM SERPL-SCNC: 4.4 MMOL/L
PROT SERPL-MCNC: 6.6 G/DL
SODIUM SERPL-SCNC: 139 MMOL/L

## 2020-12-08 ENCOUNTER — APPOINTMENT (OUTPATIENT)
Dept: INTERNAL MEDICINE | Facility: CLINIC | Age: 85
End: 2020-12-08
Payer: MEDICARE

## 2020-12-08 VITALS
BODY MASS INDEX: 24.54 KG/M2 | HEIGHT: 60 IN | DIASTOLIC BLOOD PRESSURE: 75 MMHG | TEMPERATURE: 97.3 F | SYSTOLIC BLOOD PRESSURE: 115 MMHG | OXYGEN SATURATION: 97 % | WEIGHT: 125 LBS | HEART RATE: 73 BPM

## 2020-12-08 DIAGNOSIS — I87.2 VENOUS INSUFFICIENCY (CHRONIC) (PERIPHERAL): ICD-10-CM

## 2020-12-08 PROCEDURE — 99214 OFFICE O/P EST MOD 30 MIN: CPT

## 2020-12-08 PROCEDURE — 99072 ADDL SUPL MATRL&STAF TM PHE: CPT

## 2020-12-08 RX ORDER — VALACYCLOVIR 1 G/1
1 TABLET, FILM COATED ORAL 3 TIMES DAILY
Qty: 15 | Refills: 0 | Status: DISCONTINUED | COMMUNITY
Start: 2020-06-26 | End: 2020-12-08

## 2020-12-08 RX ORDER — DICLOFENAC SODIUM 50 MG/1
50 TABLET, DELAYED RELEASE ORAL
Qty: 20 | Refills: 1 | Status: DISCONTINUED | COMMUNITY
Start: 2018-12-14 | End: 2020-12-08

## 2020-12-08 RX ORDER — ERGOCALCIFEROL 1.25 MG/1
1.25 MG CAPSULE, LIQUID FILLED ORAL
Qty: 12 | Refills: 3 | Status: DISCONTINUED | COMMUNITY
Start: 2017-02-01 | End: 2020-12-08

## 2020-12-08 RX ORDER — IBANDRONATE SODIUM 150 MG/1
150 TABLET ORAL
Qty: 3 | Refills: 3 | Status: DISCONTINUED | COMMUNITY
Start: 2017-10-10 | End: 2020-12-08

## 2020-12-08 RX ORDER — BETAMETHASONE DIPROPIONATE 0.5 MG/G
0.05 CREAM, AUGMENTED TOPICAL
Qty: 1 | Refills: 0 | Status: DISCONTINUED | COMMUNITY
Start: 2020-10-14 | End: 2020-12-08

## 2020-12-11 NOTE — PHYSICAL THERAPY INITIAL EVALUATION ADULT - IMPAIRED TRANSFERS: SIT/STAND, REHAB EVAL
Pharmacy requesting refill rx    Name from pharmacy: QUETIAPINE FUMARATE 25 MG TAB         Will file in chart as: QUEtiapine (SEROquel) 25 MG tablet    Sig: TAKE 1/2 TABLET BY MOUTH NIGHTLY    Disp:  15 tablet    Refills:  1    Start: 12/11/2020    Class: Eprescribe    Last ordered: 2 months ago by Tiffanie Argueta MD Last refill: 11/19/2020    Rx #: [id number]       To be filled at: [facility]/pharmacy #[id number] - [place], [place] - [address]       Patient last seen on 11/18/20 and appt on 12/16/20.   Medication refilled per protocol.  
decreased strength

## 2020-12-15 ENCOUNTER — OUTPATIENT (OUTPATIENT)
Dept: OUTPATIENT SERVICES | Facility: HOSPITAL | Age: 85
LOS: 1 days | End: 2020-12-15

## 2020-12-15 ENCOUNTER — APPOINTMENT (OUTPATIENT)
Dept: RADIOLOGY | Facility: CLINIC | Age: 85
End: 2020-12-15
Payer: MEDICARE

## 2020-12-15 ENCOUNTER — RESULT REVIEW (OUTPATIENT)
Age: 85
End: 2020-12-15

## 2020-12-15 DIAGNOSIS — Z96.642 PRESENCE OF LEFT ARTIFICIAL HIP JOINT: Chronic | ICD-10-CM

## 2020-12-15 PROCEDURE — 77085 DXA BONE DENSITY AXL VRT FX: CPT | Mod: 26

## 2021-01-06 ENCOUNTER — RX RENEWAL (OUTPATIENT)
Age: 86
End: 2021-01-06

## 2021-01-14 ENCOUNTER — APPOINTMENT (OUTPATIENT)
Dept: DERMATOLOGY | Facility: CLINIC | Age: 86
End: 2021-01-14
Payer: MEDICARE

## 2021-01-14 DIAGNOSIS — R23.4 CHANGES IN SKIN TEXTURE: ICD-10-CM

## 2021-01-14 DIAGNOSIS — L57.0 ACTINIC KERATOSIS: ICD-10-CM

## 2021-01-14 DIAGNOSIS — L30.9 DERMATITIS, UNSPECIFIED: ICD-10-CM

## 2021-01-14 PROCEDURE — 99214 OFFICE O/P EST MOD 30 MIN: CPT | Mod: 25

## 2021-01-14 PROCEDURE — 17000 DESTRUCT PREMALG LESION: CPT

## 2021-01-14 PROCEDURE — 99072 ADDL SUPL MATRL&STAF TM PHE: CPT

## 2021-01-14 RX ORDER — BETAMETHASONE DIPROPIONATE 0.5 MG/G
0.05 OINTMENT, AUGMENTED TOPICAL
Qty: 1 | Refills: 2 | Status: ACTIVE | COMMUNITY
Start: 2021-01-14 | End: 1900-01-01

## 2021-01-14 NOTE — PHYSICAL EXAM
[Alert] : alert [Oriented x 3] : ~L oriented x 3 [Well Nourished] : well nourished [Conjunctiva Non-injected] : conjunctiva non-injected [No Visual Lymphadenopathy] : no visual  lymphadenopathy [No Clubbing] : no clubbing [No Edema] : no edema [No Bromhidrosis] : no bromhidrosis [No Chromhidrosis] : no chromhidrosis [FreeTextEntry3] : pitting edema, erythema of BLE with induration in inverted champagne bottle distribution\par scaly skin colored plaques and deep fissures on fingers\par hyperkeratotic pink papule right cheek

## 2021-01-14 NOTE — HISTORY OF PRESENT ILLNESS
[FreeTextEntry1] : fu legs, cuts on fingers [de-identified] : 84 yo F hx NMSC (refusing additional exams or tx) here for above\par given betamethasone for LDS, made legs burn, PMD gave Lotrisone helped much more but then returned\par painful cuts on hands from washing

## 2021-02-22 ENCOUNTER — LABORATORY RESULT (OUTPATIENT)
Age: 86
End: 2021-02-22

## 2021-02-22 ENCOUNTER — APPOINTMENT (OUTPATIENT)
Dept: INTERNAL MEDICINE | Facility: CLINIC | Age: 86
End: 2021-02-22
Payer: MEDICARE

## 2021-02-22 VITALS
OXYGEN SATURATION: 98 % | SYSTOLIC BLOOD PRESSURE: 156 MMHG | DIASTOLIC BLOOD PRESSURE: 80 MMHG | HEART RATE: 74 BPM | TEMPERATURE: 97.6 F

## 2021-02-22 DIAGNOSIS — M25.473 EFFUSION, UNSPECIFIED ANKLE: ICD-10-CM

## 2021-02-22 PROCEDURE — 99072 ADDL SUPL MATRL&STAF TM PHE: CPT

## 2021-02-22 PROCEDURE — 99397 PER PM REEVAL EST PAT 65+ YR: CPT | Mod: 25

## 2021-02-22 PROCEDURE — 99213 OFFICE O/P EST LOW 20 MIN: CPT | Mod: 25

## 2021-02-23 ENCOUNTER — RX CHANGE (OUTPATIENT)
Age: 86
End: 2021-02-23

## 2021-02-23 LAB
25(OH)D3 SERPL-MCNC: 47.4 NG/ML
ALBUMIN SERPL ELPH-MCNC: 4.3 G/DL
ALP BLD-CCNC: 87 U/L
ALT SERPL-CCNC: 11 U/L
ANION GAP SERPL CALC-SCNC: 15 MMOL/L
AST SERPL-CCNC: 13 U/L
BASOPHILS # BLD AUTO: 0.05 K/UL
BASOPHILS NFR BLD AUTO: 0.6 %
BILIRUB SERPL-MCNC: 0.3 MG/DL
BUN SERPL-MCNC: 16 MG/DL
CALCIUM SERPL-MCNC: 9.5 MG/DL
CHLORIDE SERPL-SCNC: 102 MMOL/L
CHOLEST SERPL-MCNC: 181 MG/DL
CO2 SERPL-SCNC: 22 MMOL/L
CREAT SERPL-MCNC: 0.82 MG/DL
CREAT SPEC-SCNC: 34 MG/DL
EOSINOPHIL # BLD AUTO: 0.23 K/UL
EOSINOPHIL NFR BLD AUTO: 3 %
ESTIMATED AVERAGE GLUCOSE: 163 MG/DL
FERRITIN SERPL-MCNC: 65 NG/ML
GLUCOSE SERPL-MCNC: 146 MG/DL
HBA1C MFR BLD HPLC: 7.3 %
HCT VFR BLD CALC: 36 %
HDLC SERPL-MCNC: 78 MG/DL
HGB BLD-MCNC: 11.4 G/DL
IMM GRANULOCYTES NFR BLD AUTO: 0.4 %
IRON SERPL-MCNC: 56 UG/DL
LDLC SERPL CALC-MCNC: 91 MG/DL
LYMPHOCYTES # BLD AUTO: 1.86 K/UL
LYMPHOCYTES NFR BLD AUTO: 24 %
MAN DIFF?: NORMAL
MCHC RBC-ENTMCNC: 28.9 PG
MCHC RBC-ENTMCNC: 31.7 GM/DL
MCV RBC AUTO: 91.4 FL
MICROALBUMIN 24H UR DL<=1MG/L-MCNC: 1.9 MG/DL
MICROALBUMIN/CREAT 24H UR-RTO: 56 MG/G
MONOCYTES # BLD AUTO: 0.44 K/UL
MONOCYTES NFR BLD AUTO: 5.7 %
NEUTROPHILS # BLD AUTO: 5.13 K/UL
NEUTROPHILS NFR BLD AUTO: 66.3 %
NONHDLC SERPL-MCNC: 103 MG/DL
PLATELET # BLD AUTO: 284 K/UL
POTASSIUM SERPL-SCNC: 4.3 MMOL/L
PROT SERPL-MCNC: 6.7 G/DL
RBC # BLD: 3.94 M/UL
RBC # FLD: 15.9 %
SODIUM SERPL-SCNC: 139 MMOL/L
TRIGL SERPL-MCNC: 61 MG/DL
WBC # FLD AUTO: 7.74 K/UL

## 2021-02-24 LAB
APPEARANCE: CLEAR
BILIRUBIN URINE: NEGATIVE
BLOOD URINE: NEGATIVE
COLOR: NORMAL
GLUCOSE QUALITATIVE U: NEGATIVE
KETONES URINE: NEGATIVE
LEUKOCYTE ESTERASE URINE: NEGATIVE
NITRITE URINE: POSITIVE
PH URINE: 6.5
PROTEIN URINE: NEGATIVE
SPECIFIC GRAVITY URINE: 1.01
UROBILINOGEN URINE: NORMAL

## 2021-02-26 ENCOUNTER — RX CHANGE (OUTPATIENT)
Age: 86
End: 2021-02-26

## 2021-03-04 DIAGNOSIS — M79.673 PAIN IN UNSPECIFIED FOOT: ICD-10-CM

## 2021-04-01 PROCEDURE — G9005: CPT

## 2021-04-02 ENCOUNTER — APPOINTMENT (OUTPATIENT)
Dept: NEUROLOGY | Facility: CLINIC | Age: 86
End: 2021-04-02

## 2021-04-19 NOTE — PROGRESS NOTE ADULT - NS NEC GEN PE MLT EXAM PC
No bruits; no thyromegaly or nodules
no abrasions, no jaundice, no lesions, no pruritis, and no rashes.

## 2021-04-22 ENCOUNTER — APPOINTMENT (OUTPATIENT)
Dept: DERMATOLOGY | Facility: CLINIC | Age: 86
End: 2021-04-22
Payer: MEDICARE

## 2021-04-22 DIAGNOSIS — I83.12 VARICOSE VEINS OF RIGHT LOWER EXTREMITY WITH INFLAMMATION: ICD-10-CM

## 2021-04-22 DIAGNOSIS — I83.11 VARICOSE VEINS OF RIGHT LOWER EXTREMITY WITH INFLAMMATION: ICD-10-CM

## 2021-04-22 PROCEDURE — 99213 OFFICE O/P EST LOW 20 MIN: CPT

## 2021-04-22 PROCEDURE — 99072 ADDL SUPL MATRL&STAF TM PHE: CPT

## 2021-04-22 NOTE — PHYSICAL EXAM
[Alert] : alert [Oriented x 3] : ~L oriented x 3 [Well Nourished] : well nourished [Conjunctiva Non-injected] : conjunctiva non-injected [No Visual Lymphadenopathy] : no visual  lymphadenopathy [No Clubbing] : no clubbing [No Edema] : no edema [No Chromhidrosis] : no chromhidrosis [No Bromhidrosis] : no bromhidrosis [FreeTextEntry3] : pitting edema, erythema of BLE with induration in inverted champagne bottle distribution -significantly less erythema and induration from prior

## 2021-04-22 NOTE — HISTORY OF PRESENT ILLNESS
[FreeTextEntry1] : fu legs, cuts on fingers [de-identified] : had birthday celebration with family yesterday, daughters bought nice gifts\par cuts on fingers healed, did not use prescription\par legs improved with antibiotic cream prescribed - can not remember name\par not using compression because does not like them\par not interested in skin check\par refusing covid vaccine\par \par last visit Jan 2021: 86 yo F hx NMSC (refusing additional exams or tx) here for above\par given betamethasone for LDS, made legs burn, PMD gave Lotrisone helped much more but then returned\par painful cuts on hands from washing  no

## 2021-05-03 ENCOUNTER — APPOINTMENT (OUTPATIENT)
Dept: INTERNAL MEDICINE | Facility: CLINIC | Age: 86
End: 2021-05-03
Payer: MEDICARE

## 2021-05-12 ENCOUNTER — APPOINTMENT (OUTPATIENT)
Dept: INTERNAL MEDICINE | Facility: CLINIC | Age: 86
End: 2021-05-12
Payer: MEDICARE

## 2021-05-12 VITALS
BODY MASS INDEX: 24.61 KG/M2 | HEART RATE: 81 BPM | TEMPERATURE: 97.9 F | SYSTOLIC BLOOD PRESSURE: 126 MMHG | DIASTOLIC BLOOD PRESSURE: 70 MMHG | OXYGEN SATURATION: 98 % | WEIGHT: 126 LBS

## 2021-05-12 DIAGNOSIS — G25.81 RESTLESS LEGS SYNDROME: ICD-10-CM

## 2021-05-12 DIAGNOSIS — Z71.89 OTHER SPECIFIED COUNSELING: ICD-10-CM

## 2021-05-12 PROCEDURE — 99214 OFFICE O/P EST MOD 30 MIN: CPT

## 2021-05-12 PROCEDURE — 99072 ADDL SUPL MATRL&STAF TM PHE: CPT

## 2021-05-12 RX ORDER — LISINOPRIL 10 MG/1
10 TABLET ORAL DAILY
Qty: 90 | Refills: 3 | Status: DISCONTINUED | COMMUNITY
Start: 2019-03-15 | End: 2021-05-12

## 2021-05-26 NOTE — ED PROVIDER NOTE - NSTIMEPROVIDERCAREINITIATE_GEN_ER
14-Aug-2020 14:28 O-L Flap Text: The defect edges were debeveled with a #15 scalpel blade.  Given the location of the defect, shape of the defect and the proximity to free margins an O-L flap was deemed most appropriate.  Using a sterile surgical marker, an appropriate advancement flap was drawn incorporating the defect and placing the expected incisions within the relaxed skin tension lines where possible.    The area thus outlined was incised deep to adipose tissue with a #15 scalpel blade.  The skin margins were undermined to an appropriate distance in all directions utilizing iris scissors.

## 2021-07-15 ENCOUNTER — EMERGENCY (EMERGENCY)
Facility: HOSPITAL | Age: 86
LOS: 1 days | Discharge: HOME | End: 2021-07-15
Admitting: EMERGENCY MEDICINE
Payer: MEDICARE

## 2021-07-15 VITALS
SYSTOLIC BLOOD PRESSURE: 145 MMHG | HEART RATE: 76 BPM | DIASTOLIC BLOOD PRESSURE: 78 MMHG | RESPIRATION RATE: 16 BRPM | TEMPERATURE: 98 F | OXYGEN SATURATION: 98 %

## 2021-07-15 VITALS
HEART RATE: 85 BPM | DIASTOLIC BLOOD PRESSURE: 68 MMHG | SYSTOLIC BLOOD PRESSURE: 159 MMHG | TEMPERATURE: 98 F | WEIGHT: 128.09 LBS | RESPIRATION RATE: 18 BRPM | HEIGHT: 60 IN | OXYGEN SATURATION: 98 %

## 2021-07-15 DIAGNOSIS — E11.9 TYPE 2 DIABETES MELLITUS WITHOUT COMPLICATIONS: ICD-10-CM
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
## 2021-07-15 DIAGNOSIS — M25.551 PAIN IN RIGHT HIP: ICD-10-CM

## 2021-07-15 DIAGNOSIS — S30.0XXA CONTUSION OF LOWER BACK AND PELVIS, INITIAL ENCOUNTER: ICD-10-CM
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
## 2021-07-15 DIAGNOSIS — Z96.642 PRESENCE OF LEFT ARTIFICIAL HIP JOINT: ICD-10-CM
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
## 2021-07-15 DIAGNOSIS — M54.5 LOW BACK PAIN: ICD-10-CM

## 2021-07-15 DIAGNOSIS — Z96.642 PRESENCE OF LEFT ARTIFICIAL HIP JOINT: Chronic | ICD-10-CM

## 2021-07-15 DIAGNOSIS — M25.552 PAIN IN LEFT HIP: ICD-10-CM

## 2021-07-15 LAB
ALBUMIN SERPL ELPH-MCNC: 3.8 G/DL — SIGNIFICANT CHANGE UP (ref 3.4–5)
ALP SERPL-CCNC: 81 U/L — SIGNIFICANT CHANGE UP (ref 40–120)
ALT FLD-CCNC: 19 U/L — SIGNIFICANT CHANGE UP (ref 12–42)
ANION GAP SERPL CALC-SCNC: 7 MMOL/L — LOW (ref 9–16)
AST SERPL-CCNC: 16 U/L — SIGNIFICANT CHANGE UP (ref 15–37)
BASOPHILS # BLD AUTO: 0.06 K/UL — SIGNIFICANT CHANGE UP (ref 0–0.2)
BASOPHILS NFR BLD AUTO: 0.7 % — SIGNIFICANT CHANGE UP (ref 0–2)
BILIRUB SERPL-MCNC: 0.7 MG/DL — SIGNIFICANT CHANGE UP (ref 0.2–1.2)
BUN SERPL-MCNC: 13 MG/DL — SIGNIFICANT CHANGE UP (ref 7–23)
CALCIUM SERPL-MCNC: 9.5 MG/DL — SIGNIFICANT CHANGE UP (ref 8.5–10.5)
CHLORIDE SERPL-SCNC: 104 MMOL/L — SIGNIFICANT CHANGE UP (ref 96–108)
CO2 SERPL-SCNC: 30 MMOL/L — SIGNIFICANT CHANGE UP (ref 22–31)
CREAT SERPL-MCNC: 0.9 MG/DL — SIGNIFICANT CHANGE UP (ref 0.5–1.3)
EOSINOPHIL # BLD AUTO: 0.1 K/UL — SIGNIFICANT CHANGE UP (ref 0–0.5)
EOSINOPHIL NFR BLD AUTO: 1.2 % — SIGNIFICANT CHANGE UP (ref 0–6)
GLUCOSE SERPL-MCNC: 192 MG/DL — HIGH (ref 70–99)
HCT VFR BLD CALC: 35 % — SIGNIFICANT CHANGE UP (ref 34.5–45)
HGB BLD-MCNC: 11.9 G/DL — SIGNIFICANT CHANGE UP (ref 11.5–15.5)
IMM GRANULOCYTES NFR BLD AUTO: 0.4 % — SIGNIFICANT CHANGE UP (ref 0–1.5)
LYMPHOCYTES # BLD AUTO: 1.37 K/UL — SIGNIFICANT CHANGE UP (ref 1–3.3)
LYMPHOCYTES # BLD AUTO: 16.7 % — SIGNIFICANT CHANGE UP (ref 13–44)
MAGNESIUM SERPL-MCNC: 1.4 MG/DL — LOW (ref 1.6–2.6)
MCHC RBC-ENTMCNC: 29.6 PG — SIGNIFICANT CHANGE UP (ref 27–34)
MCHC RBC-ENTMCNC: 34 GM/DL — SIGNIFICANT CHANGE UP (ref 32–36)
MCV RBC AUTO: 87.1 FL — SIGNIFICANT CHANGE UP (ref 80–100)
MONOCYTES # BLD AUTO: 0.46 K/UL — SIGNIFICANT CHANGE UP (ref 0–0.9)
MONOCYTES NFR BLD AUTO: 5.6 % — SIGNIFICANT CHANGE UP (ref 2–14)
NEUTROPHILS # BLD AUTO: 6.18 K/UL — SIGNIFICANT CHANGE UP (ref 1.8–7.4)
NEUTROPHILS NFR BLD AUTO: 75.4 % — SIGNIFICANT CHANGE UP (ref 43–77)
NRBC # BLD: 0 /100 WBCS — SIGNIFICANT CHANGE UP (ref 0–0)
PLATELET # BLD AUTO: 307 K/UL — SIGNIFICANT CHANGE UP (ref 150–400)
POTASSIUM SERPL-MCNC: 4 MMOL/L — SIGNIFICANT CHANGE UP (ref 3.5–5.3)
POTASSIUM SERPL-SCNC: 4 MMOL/L — SIGNIFICANT CHANGE UP (ref 3.5–5.3)
PROT SERPL-MCNC: 6.8 G/DL — SIGNIFICANT CHANGE UP (ref 6.4–8.2)
RBC # BLD: 4.02 M/UL — SIGNIFICANT CHANGE UP (ref 3.8–5.2)
RBC # FLD: 15.5 % — HIGH (ref 10.3–14.5)
SODIUM SERPL-SCNC: 141 MMOL/L — SIGNIFICANT CHANGE UP (ref 132–145)
WBC # BLD: 8.2 K/UL — SIGNIFICANT CHANGE UP (ref 3.8–10.5)
WBC # FLD AUTO: 8.2 K/UL — SIGNIFICANT CHANGE UP (ref 3.8–10.5)

## 2021-07-15 PROCEDURE — 99284 EMERGENCY DEPT VISIT MOD MDM: CPT

## 2021-07-15 PROCEDURE — 72192 CT PELVIS W/O DYE: CPT | Mod: 26

## 2021-07-15 PROCEDURE — 93010 ELECTROCARDIOGRAM REPORT: CPT

## 2021-07-15 RX ORDER — ACETAMINOPHEN 500 MG
650 TABLET ORAL ONCE
Refills: 0 | Status: COMPLETED | OUTPATIENT
Start: 2021-07-15 | End: 2021-07-15

## 2021-07-15 RX ORDER — KETOROLAC TROMETHAMINE 30 MG/ML
15 SYRINGE (ML) INJECTION ONCE
Refills: 0 | Status: DISCONTINUED | OUTPATIENT
Start: 2021-07-15 | End: 2021-07-15

## 2021-07-15 RX ORDER — ACETAMINOPHEN 500 MG
975 TABLET ORAL ONCE
Refills: 0 | Status: COMPLETED | OUTPATIENT
Start: 2021-07-15 | End: 2021-07-15

## 2021-07-15 RX ADMIN — Medication 15 MILLIGRAM(S): at 22:22

## 2021-07-15 RX ADMIN — Medication 975 MILLIGRAM(S): at 14:01

## 2021-07-15 RX ADMIN — Medication 15 MILLIGRAM(S): at 19:16

## 2021-07-15 RX ADMIN — Medication 650 MILLIGRAM(S): at 22:28

## 2021-07-15 RX ADMIN — Medication 975 MILLIGRAM(S): at 22:22

## 2021-07-15 NOTE — ED PROVIDER NOTE - PROGRESS NOTE DETAILS
CT prelim negative.  Pt is requesting pain medication and requesting discharge. She is ambulatory pta.

## 2021-07-15 NOTE — ED ADULT NURSE NOTE - NSIMPLEMENTINTERV_GEN_ALL_ED
Implemented All Fall Risk Interventions:  [place] to call system. Call bell, personal items and telephone within reach. Instruct patient to call for assistance. Room bathroom lighting operational. Non-slip footwear when patient is off stretcher. Physically safe environment: no spills, clutter or unnecessary equipment. Stretcher in lowest position, wheels locked, appropriate side rails in place. Provide visual cue, wrist band, yellow gown, etc. Monitor gait and stability. Monitor for mental status changes and reorient to person, place, and time. Review medications for side effects contributing to fall risk. Reinforce activity limits and safety measures with patient and family.

## 2021-07-15 NOTE — ED PROVIDER NOTE - PATIENT PORTAL LINK FT
You can access the FollowMyHealth Patient Portal offered by [facility] by registering at the following website: [email or web address]. By joining [email or web address]’s FollowMyHealth portal, you will also be able to view your health information using other applications (apps) compatible with our system.

## 2021-07-15 NOTE — ED PROVIDER NOTE - CLINICAL SUMMARY MEDICAL DECISION MAKING FREE TEXT BOX
87 y/o F presents to ED c/o presents to ED c/o lumbar back and hip/buttock pain s/p mechanical fall 1 week ago.  Pt well appearing, VSS, NAD.  Labs unremarkable.  CT pelvis ordered.  Pt given Lidoderm patch and Tylenol for pain. 87 y/o F presents to ED c/o presents to ED c/o lumbar back and hip/buttock pain s/p mechanical fall 1 week ago.  Pt well appearing, VSS, NAD.  Labs unremarkable.  CT pelvis ordered.  Pt given Lidoderm patch and Tylenol for pain.    .

## 2021-07-15 NOTE — ED PROVIDER NOTE - MUSCULOSKELETAL, MLM
No midline spinal tenderness or step-off. + TTP to bilateral lower back and buttocks; pelvis stable. FROM bilateral upper arms and L lower extremities. Full flexion and extension of R hip; full adduction, decreased abduction of R hip secondary to pain. No midline spinal tenderness or step-offs. + TTP to bilateral lumbar back and buttocks; pelvis stable. FROM bilateral upper arms and L lower extremities. Full flexion and extension of R hip; full adduction, decreased abduction of R hip secondary to pain.

## 2021-07-15 NOTE — ED ADULT NURSE REASSESSMENT NOTE - NS ED NURSE REASSESS COMMENT FT1
Received Pt from previous RN sitting on chair, awake and alert. Breathing without issue on RA and NAD. Awaiting transportation home.

## 2021-07-15 NOTE — ED ADULT NURSE NOTE - OBJECTIVE STATEMENT
Pt came in c/o of fall x1 week ago "the wheels on my walker locked up and I fell over." Pt reports hitting head. Pt denies loc/ac use. No bruising noted on lower back. Pt reports being ambulatory with walker but came in today for no change in pain since the fall. Pt denies fever, chills, sob, cp, n/v/d. A&Ox3 speaking in full sentences. Bed alarm on.

## 2021-07-15 NOTE — ED PROVIDER NOTE - NSFOLLOWUPINSTRUCTIONS_ED_ALL_ED_FT
Contusion    A contusion is a deep bruise. Contusions are the result of a blunt injury to tissues and muscle fibers under the skin. The skin overlying the contusion may turn blue, purple, or yellow. Symptoms also include pain and swelling in the injured area.    SEEK IMMEDIATE MEDICAL CARE IF YOU HAVE ANY OF THE FOLLOWING SYMPTOMS: severe pain, numbness, tingling, pain, weakness, or skin color/temperature change in any part of your body distal to the injury.    -PLEASE FOLLOW-UP WITH YOUR PRIMARY CARE DOCTOR IN 1-2 DAYS.  BRING ALL PAPERWORK FROM TODAY'S VISIT TO YOUR FOLLOW-UP VISIT.  IF YOU DO NOT HAVE A PRIMARY CARE DOCTOR PLEASE REFER TO THE OFFICE/CLINIC INFORMATION GIVEN ABOVE.  YOU MAY ALSO CALL [phone] AND ASK FOR MS. LISA MAJOR.  SHE CAN HELP YOU MAKE A FOLLOW-UP APPOINTMENT.  HER HOURS ARE 9AM-5PM MONDAY - FRIDAY.  -TAKE OVER THE COUNTER TYLENOL 650MG BY MOUTH EVERY 4-6 HOURS AS NEEDED FOR PAIN.  DO NOT MIX WITH ALCOHOL OR OTHER PRESCRIPTION MEDICATIONS THAT ALREADY CONTAIN TYLENOL OR ACETAMINOPHEN.   -TAKE OVER THE COUNTER IBUPROFEN 400-600MG BY MOUTH EVERY 8 HOURS AS NEEDED FOR PAIN.  BE SURE TO TAKE WITH FOOD OR MILK AS THIS MEDICATION CAN CAUSE STOMACH IRRITATION.  -PLEASE RETURN TO THE EMERGENCY DEPARTMENT IMMEDIATELY OR CALL 911 FOR ANY HIGH FEVER, TROUBLE BREATHING, VOMITING, SEVERE PAIN, OR ANY OTHER CONCERNS.

## 2021-07-15 NOTE — ED PROVIDER NOTE - OBJECTIVE STATEMENT
86 year old F with PMHx of NIDDM and PSHx of L hip hemiarthroplasty presents to ED complaining of lower back and bilateral hip pain, righ greater than left x 1 week s/p fall from standing. She states she was turning a corner with Rollator when a wheel locked causing her to fall backwards. She struck her head on a fence but denies LOC. She was assisted up by a bystander. She has been walking with her Rollator since the fall but states the lower back pain and hip pain has not improved. She describes that the pain "moves side to side" and sometimes she has pain to bilateral inner groin. She denies headache, visual changes, chest pain, cough, SOB, abdominal pain, nausea, vomiting, diarrhea, weakness, numbness, subsequent falls, dysuria, hematuria, fever, chills, recent illness, and dizziness.

## 2021-07-15 NOTE — ED ADULT TRIAGE NOTE - CHIEF COMPLAINT QUOTE
pt. s/p  fall with her walker 1 week ago c/o worsening pain to her lower back and buttock.  Reports hitting her head but denies LOC.

## 2021-07-15 NOTE — ED PROVIDER NOTE - CHPI ED SYMPTOMS NEG
no chest pain, no headache, no visual changes, no chest pain, no cough, no SOB, no abdominal pain, no nausea, no diarrhea, no dysuria, no hematuria, no chills, no dizziness/no fever/no loss of consciousness/no numbness/no vomiting/no weakness

## 2021-07-16 ENCOUNTER — NON-APPOINTMENT (OUTPATIENT)
Age: 86
End: 2021-07-16

## 2021-07-16 RX ORDER — DICLOFENAC SODIUM 1% 10 MG/G
1 GEL TOPICAL DAILY
Qty: 1 | Refills: 2 | Status: ACTIVE | COMMUNITY
Start: 2021-07-16 | End: 1900-01-01

## 2021-07-23 ENCOUNTER — APPOINTMENT (OUTPATIENT)
Dept: INTERNAL MEDICINE | Facility: CLINIC | Age: 86
End: 2021-07-23
Payer: MEDICARE

## 2021-07-23 VITALS
WEIGHT: 122 LBS | TEMPERATURE: 97.6 F | DIASTOLIC BLOOD PRESSURE: 72 MMHG | BODY MASS INDEX: 23.95 KG/M2 | OXYGEN SATURATION: 99 % | HEIGHT: 60 IN | HEART RATE: 82 BPM | SYSTOLIC BLOOD PRESSURE: 130 MMHG

## 2021-07-23 DIAGNOSIS — M54.42 LUMBAGO WITH SCIATICA, LEFT SIDE: ICD-10-CM

## 2021-07-23 PROCEDURE — 99214 OFFICE O/P EST MOD 30 MIN: CPT

## 2021-08-12 ENCOUNTER — APPOINTMENT (OUTPATIENT)
Dept: INTERNAL MEDICINE | Facility: CLINIC | Age: 86
End: 2021-08-12
Payer: MEDICARE

## 2021-08-12 ENCOUNTER — APPOINTMENT (OUTPATIENT)
Dept: INTERNAL MEDICINE | Facility: CLINIC | Age: 86
End: 2021-08-12

## 2021-08-25 ENCOUNTER — APPOINTMENT (OUTPATIENT)
Dept: INTERNAL MEDICINE | Facility: CLINIC | Age: 86
End: 2021-08-25
Payer: MEDICARE

## 2021-08-25 VITALS
SYSTOLIC BLOOD PRESSURE: 126 MMHG | OXYGEN SATURATION: 98 % | WEIGHT: 126 LBS | HEART RATE: 79 BPM | BODY MASS INDEX: 24.61 KG/M2 | DIASTOLIC BLOOD PRESSURE: 70 MMHG | TEMPERATURE: 98.2 F

## 2021-08-25 PROCEDURE — 99214 OFFICE O/P EST MOD 30 MIN: CPT | Mod: 25

## 2021-08-25 RX ORDER — TRAMADOL HYDROCHLORIDE 50 MG/1
50 TABLET, COATED ORAL
Qty: 16 | Refills: 0 | Status: ACTIVE | COMMUNITY
Start: 2017-03-12 | End: 1900-01-01

## 2021-08-26 LAB
ALBUMIN SERPL ELPH-MCNC: 4.5 G/DL
ALP BLD-CCNC: 129 U/L
ALT SERPL-CCNC: 15 U/L
ANION GAP SERPL CALC-SCNC: 15 MMOL/L
AST SERPL-CCNC: 16 U/L
BILIRUB SERPL-MCNC: 0.3 MG/DL
BUN SERPL-MCNC: 12 MG/DL
CALCIUM SERPL-MCNC: 9.9 MG/DL
CHLORIDE SERPL-SCNC: 99 MMOL/L
CO2 SERPL-SCNC: 25 MMOL/L
CREAT SERPL-MCNC: 0.73 MG/DL
ESTIMATED AVERAGE GLUCOSE: 183 MG/DL
GLUCOSE SERPL-MCNC: 176 MG/DL
HBA1C MFR BLD HPLC: 8 %
POTASSIUM SERPL-SCNC: 4.7 MMOL/L
PROT SERPL-MCNC: 6.6 G/DL
SODIUM SERPL-SCNC: 139 MMOL/L

## 2021-10-05 NOTE — ED PROVIDER NOTE - CROS ED ROS STATEMENT
all other ROS negative except as per HPI

## 2021-10-12 ENCOUNTER — APPOINTMENT (OUTPATIENT)
Dept: INTERNAL MEDICINE | Facility: CLINIC | Age: 86
End: 2021-10-12
Payer: MEDICARE

## 2021-10-12 ENCOUNTER — APPOINTMENT (OUTPATIENT)
Dept: ORTHOPEDIC SURGERY | Facility: CLINIC | Age: 86
End: 2021-10-12

## 2021-10-12 PROCEDURE — 99442: CPT

## 2021-11-22 ENCOUNTER — LABORATORY RESULT (OUTPATIENT)
Age: 86
End: 2021-11-22

## 2021-11-22 ENCOUNTER — APPOINTMENT (OUTPATIENT)
Dept: INTERNAL MEDICINE | Facility: CLINIC | Age: 86
End: 2021-11-22
Payer: MEDICARE

## 2021-11-22 VITALS
BODY MASS INDEX: 25.13 KG/M2 | HEART RATE: 76 BPM | WEIGHT: 128 LBS | HEIGHT: 60 IN | OXYGEN SATURATION: 98 % | DIASTOLIC BLOOD PRESSURE: 85 MMHG | TEMPERATURE: 98.1 F | SYSTOLIC BLOOD PRESSURE: 135 MMHG

## 2021-11-22 DIAGNOSIS — L30.9 DERMATITIS, UNSPECIFIED: ICD-10-CM

## 2021-11-22 DIAGNOSIS — N39.0 URINARY TRACT INFECTION, SITE NOT SPECIFIED: ICD-10-CM

## 2021-11-22 PROCEDURE — 99214 OFFICE O/P EST MOD 30 MIN: CPT

## 2021-11-22 RX ORDER — AMMONIUM LACTATE 12 %
12 CREAM (GRAM) TOPICAL TWICE DAILY
Qty: 1 | Refills: 1 | Status: ACTIVE | COMMUNITY
Start: 2021-11-22 | End: 1900-01-01

## 2021-11-23 LAB
APPEARANCE: CLEAR
BILIRUBIN URINE: NEGATIVE
BLOOD URINE: NEGATIVE
COLOR: YELLOW
GLUCOSE QUALITATIVE U: NEGATIVE
KETONES URINE: NEGATIVE
LEUKOCYTE ESTERASE URINE: ABNORMAL
NITRITE URINE: NEGATIVE
PH URINE: 7
PROTEIN URINE: NEGATIVE
SPECIFIC GRAVITY URINE: 1.01
UROBILINOGEN URINE: NORMAL

## 2021-11-24 LAB — BACTERIA UR CULT: NORMAL

## 2022-03-14 ENCOUNTER — APPOINTMENT (OUTPATIENT)
Dept: INTERNAL MEDICINE | Facility: CLINIC | Age: 87
End: 2022-03-14
Payer: MEDICARE

## 2022-03-14 VITALS
HEART RATE: 82 BPM | BODY MASS INDEX: 25.52 KG/M2 | WEIGHT: 130 LBS | SYSTOLIC BLOOD PRESSURE: 145 MMHG | TEMPERATURE: 97.2 F | OXYGEN SATURATION: 99 % | DIASTOLIC BLOOD PRESSURE: 70 MMHG | HEIGHT: 60 IN

## 2022-03-14 PROCEDURE — 36415 COLL VENOUS BLD VENIPUNCTURE: CPT

## 2022-03-14 PROCEDURE — 99214 OFFICE O/P EST MOD 30 MIN: CPT | Mod: 25

## 2022-03-14 RX ORDER — GABAPENTIN 300 MG/1
300 CAPSULE ORAL 3 TIMES DAILY
Qty: 90 | Refills: 1 | Status: ACTIVE | COMMUNITY
Start: 2022-03-14 | End: 1900-01-01

## 2022-03-15 LAB
ALBUMIN SERPL ELPH-MCNC: 4.3 G/DL
ALP BLD-CCNC: 81 U/L
ALT SERPL-CCNC: 13 U/L
ANION GAP SERPL CALC-SCNC: 13 MMOL/L
AST SERPL-CCNC: 16 U/L
BILIRUB SERPL-MCNC: 0.3 MG/DL
BUN SERPL-MCNC: 13 MG/DL
CALCIUM SERPL-MCNC: 9.8 MG/DL
CHLORIDE SERPL-SCNC: 104 MMOL/L
CO2 SERPL-SCNC: 25 MMOL/L
CREAT SERPL-MCNC: 0.72 MG/DL
EGFR: 82 ML/MIN/1.73M2
ESTIMATED AVERAGE GLUCOSE: 151 MG/DL
GLUCOSE SERPL-MCNC: 147 MG/DL
HBA1C MFR BLD HPLC: 6.9 %
POTASSIUM SERPL-SCNC: 4.7 MMOL/L
PROT SERPL-MCNC: 6.5 G/DL
SODIUM SERPL-SCNC: 142 MMOL/L

## 2022-04-07 NOTE — PATIENT PROFILE ADULT. - HEALTHCARE QUESTIONS, PROFILE
Anaphylaxis    An anaphylactic reaction (anaphylaxis) is a sudden, severe allergic reaction that affects multiple areas of the body. An allergic reaction is an abnormal reaction to a substance (allergen) by the body's defense system. Common allergens include medicines, food, insect bites or stings, and blood products. The body releases certain proteins into the blood that can cause a variety of symptoms such as an itchy rash, wheezing, swelling of the face/lips/tongue/throat, abdominal pain, nausea or vomiting. An allergic reaction is usually treated with medication. If your health care provider prescribed you an epinephrine injection device, make sure to keep it with you at all times.    SEEK IMMEDIATE MEDICAL CARE IF YOU HAVE THE FOLLOWING SYMPTOMS: allergic reaction severe enough that required you to use epinephrine, tightness in your chest, swelling around your lips/tongue/throat, abdominal pain, vomiting or diarrhea, or lightheadedness/dizziness. These symptoms may represent a serious problem that is an emergency. Do not wait to see if the symptoms will go away. Use your auto-injector pen or anaphylaxis kit as you have been instructed, and get medical help right away. Call your local emergency services (911 in the [place] Do not drive yourself to the hospital. none

## 2022-06-14 ENCOUNTER — APPOINTMENT (OUTPATIENT)
Dept: INTERNAL MEDICINE | Facility: CLINIC | Age: 87
End: 2022-06-14
Payer: MEDICARE

## 2022-06-14 VITALS
TEMPERATURE: 98.24 F | HEART RATE: 78 BPM | DIASTOLIC BLOOD PRESSURE: 49 MMHG | WEIGHT: 123 LBS | HEIGHT: 60 IN | SYSTOLIC BLOOD PRESSURE: 102 MMHG | OXYGEN SATURATION: 97 % | BODY MASS INDEX: 24.15 KG/M2

## 2022-06-14 PROCEDURE — 99214 OFFICE O/P EST MOD 30 MIN: CPT

## 2022-06-14 RX ORDER — METFORMIN HYDROCHLORIDE 850 MG/1
850 TABLET, COATED ORAL TWICE DAILY
Qty: 180 | Refills: 3 | Status: DISCONTINUED | COMMUNITY
Start: 2017-10-10 | End: 2022-06-14

## 2022-06-25 NOTE — H&P ADULT - NSHPLABSRESULTS_GEN_ALL_CORE
11.6   26.61 )-----------( 293      ( 29 May 2020 17:01 )             34.3     05-29    138  |  101  |  17  ----------------------------<  266<H>  3.8   |  26  |  0.78    Ca    9.4      29 May 2020 21:21  Mg     1.6     05-29    TPro  6.4  /  Alb  3.5  /  TBili  0.4  /  DBili  x   /  AST  19  /  ALT  24  /  AlkPhos  67  05-29 Note written by attending, see above.  Time spent: 40min. More than 50% of the visit was spent coordinating care-

## 2022-08-24 ENCOUNTER — EMERGENCY (EMERGENCY)
Facility: HOSPITAL | Age: 87
LOS: 1 days | Discharge: HOME | End: 2022-08-24
Attending: EMERGENCY MEDICINE | Admitting: EMERGENCY MEDICINE

## 2022-08-24 VITALS
OXYGEN SATURATION: 97 % | SYSTOLIC BLOOD PRESSURE: 132 MMHG | RESPIRATION RATE: 18 BRPM | DIASTOLIC BLOOD PRESSURE: 88 MMHG | HEART RATE: 81 BPM | TEMPERATURE: 98 F

## 2022-08-24 VITALS
HEART RATE: 80 BPM | WEIGHT: 121.03 LBS | RESPIRATION RATE: 18 BRPM | SYSTOLIC BLOOD PRESSURE: 154 MMHG | HEIGHT: 60 IN | DIASTOLIC BLOOD PRESSURE: 94 MMHG | OXYGEN SATURATION: 98 % | TEMPERATURE: 98 F

## 2022-08-24 DIAGNOSIS — Z96.642 PRESENCE OF LEFT ARTIFICIAL HIP JOINT: Chronic | ICD-10-CM

## 2022-08-24 PROCEDURE — 99283 EMERGENCY DEPT VISIT LOW MDM: CPT

## 2022-08-24 RX ORDER — NITROFURANTOIN MACROCRYSTAL 50 MG
100 CAPSULE ORAL ONCE
Refills: 0 | Status: COMPLETED | OUTPATIENT
Start: 2022-08-24 | End: 2022-08-24

## 2022-08-24 RX ORDER — NITROFURANTOIN MACROCRYSTAL 50 MG
1 CAPSULE ORAL
Qty: 10 | Refills: 0
Start: 2022-08-24 | End: 2022-08-28

## 2022-08-24 RX ADMIN — Medication 100 MILLIGRAM(S): at 21:41

## 2022-08-24 NOTE — ED ADULT NURSE NOTE - OBJECTIVE STATEMENT
86 y/o female c/o painful urination and frequent urination. MD made aware. patient ambulates with walker. alert verbal oriented x3 able to make needs known

## 2022-08-24 NOTE — ED ADULT NURSE NOTE - NSHOSCREENINGQ1_ED_ALL_ED
Based on today's exam, diagnostic studies, and/or review of records, the determination was made for treatment today. LUCENTIS 0.5mg recommended TODAY after discussion of benefits, risks and alternatives. The injection was given and tolerated well by the patient. Post-injection instructions were reviewed and understood by the patient. Procedure was performed without complications. Instructed to call immediately if any new distortion, blurring, decreased vision or eye pain. No

## 2022-08-24 NOTE — ED PROVIDER NOTE - OBJECTIVE STATEMENT
87-year-old female past medical history of diabetes presents to ED for concern for urinary tract infection.  Patient states since yesterday she has had increased frequency dysuria.  No hematuria no fever no chills no back pain.  Patient states took Cystex but only 1 dose so she does not work.  Patient said urinary tract infections in the past and she states it feels like this. 87-year-old female past medical history of diabetes presents to ED for concern for urinary tract infection.  Patient states since yesterday she has had increased frequency dysuria.  No hematuria no fever no chills no back pain. No abdominal pain. Patient states took Cystex but only 1 dose so she does not work.  Patient said urinary tract infections in the past and she states it feels like this.

## 2022-08-24 NOTE — ED ADULT NURSE NOTE - NSIMPLEMENTINTERV_GEN_ALL_ED
Implemented All Fall with Harm Risk Interventions:  [place] to call system. Call bell, personal items and telephone within reach. Instruct patient to call for assistance. Room bathroom lighting operational. Non-slip footwear when patient is off stretcher. Physically safe environment: no spills, clutter or unnecessary equipment. Stretcher in lowest position, wheels locked, appropriate side rails in place. Provide visual cue, wrist band, yellow gown, etc. Monitor gait and stability. Monitor for mental status changes and reorient to person, place, and time. Review medications for side effects contributing to fall risk. Reinforce activity limits and safety measures with patient and family. Provide visual clues: red socks.

## 2022-08-24 NOTE — ED ADULT TRIAGE NOTE - HISTORY OF COVID-19 VACCINATION
[address]  [facility] EMERGENCY DEPT  [address] [id number]  [phone]    Work/School Note    Date: 8/12/2021    To Whom It May concern:    Dang Edwards was seen and treated today in the emergency room by the following provider(s):  Attending Provider: Genoveva Hung MD.      Dang Edwards is excused from work/school on 08/12/21 and 08/13/21. She is medically clear to return to work/school on 8/14/2021.        Sincerely,          Vida Jefferson MD
Vaccine status unknown

## 2022-08-24 NOTE — ED PROVIDER NOTE - PATIENT PORTAL LINK FT
You can access the FollowMyHealth Patient Portal offered by [facility] by registering at the following website: [email or web address]. By joining [employer]’s FollowMyHealth portal, you will also be able to view your health information using other applications (apps) compatible with our system.

## 2022-08-24 NOTE — ED PROVIDER NOTE - NS ED ROS FT
Review of Systems   Constitutional:  No Weight Change, No Fever, No Chills, No weakness    Cardiovascular:  No Chest Pain,   Respiratory:  No SOB, No Cough, No Wheezing  Gastrointestinal:  No Nausea, No Vomiting, No Diarrhea, No Constipation, No abdominal pain, No melena or bright red blood per rectum  Genitourinary:  + Dysuria, + Urinary Frequency, No Hematuria, No Urinary Incontinence,  Musculoskeletal:  No Arthralgias, No Myalgias, No Joint Swelling, No Joint Stiffness,  Skin:  No rashes

## 2022-08-24 NOTE — ED ADULT NURSE REASSESSMENT NOTE - NS ED NURSE REASSESS COMMENT FT1
Patient reevaluated by MD; cleared for discharge. Patient alert verbal oriented x3; ambulatory w/ walker. Left ED safely without complaint. Discharge paperwork provided.

## 2022-08-24 NOTE — ED PROVIDER NOTE - PHYSICAL EXAMINATION
Const: NAD  Eyes: PERRL, no conjunctival injection  HENT:  Neck supple without meningismus   CV: RRR, Warm, well-perfused extremities  RESP: CTA B/L, no tachypnea   GI: soft, non-tender, non-distended, no CVA tenderness   MSK: No gross deformities appreciated  Skin: Warm, dry. No rashes  Neuro: Alert, CNs II-XII grossly intact. Sensation and motor function of extremities grossly intact.  Psych: Appropriate mood and affect.

## 2022-08-24 NOTE — ED PROVIDER NOTE - NSFOLLOWUPINSTRUCTIONS_ED_ALL_ED_FT
Urinary Tract Infection, Adult  A urinary tract infection (UTI) is an infection of any part of the urinary tract, which includes the kidneys, ureters, bladder, and urethra. These organs make, store, and get rid of urine in the body. UTI can be a bladder infection (cystitis) or kidney infection (pyelonephritis).    What are the causes?  This infection may be caused by fungi, viruses, or bacteria. Bacteria are the most common cause of UTIs. This condition can also be caused by repeated incomplete emptying of the bladder during urination.    What increases the risk?  This condition is more likely to develop if:    You ignore your need to urinate or hold urine for long periods of time.  You do not empty your bladder completely during urination.  You wipe back to front after urinating or having a bowel movement, if you are female.  You are uncircumcised, if you are male.  You are constipated.  You have a urinary catheter that stays in place (indwelling).  You have a weak defense (immune) system.  You have a medical condition that affects your bowels, kidneys, or bladder.  You have diabetes.  You take antibiotic medicines frequently or for long periods of time, and the antibiotics no longer work well against certain types of infections (antibiotic resistance).  You take medicines that irritate your urinary tract.  You are exposed to chemicals that irritate your urinary tract.  You are female.    What are the signs or symptoms?  Symptoms of this condition include:    Fever.  Frequent urination or passing small amounts of urine frequently.  Needing to urinate urgently.  Pain or burning with urination.  Urine that smells bad or unusual.  Cloudy urine.  Pain in the lower abdomen or back.  Trouble urinating.  Blood in the urine.  Vomiting or being less hungry than normal.  Diarrhea or abdominal pain.  Vaginal discharge, if you are female.    How is this diagnosed?  This condition is diagnosed with a medical history and physical exam. You will also need to provide a urine sample to test your urine. Other tests may be done, including:    Blood tests.  Sexually transmitted disease (STD) testing.    If you have had more than one UTI, a cystoscopy or imaging studies may be done to determine the cause of the infections.    How is this treated?  Treatment for this condition often includes a combination of two or more of the following:    Antibiotic medicine.  Other medicines to treat less common causes of UTI.  Over-the-counter medicines to treat pain.  Drinking enough water to stay hydrated.    Follow these instructions at home:  Take over-the-counter and prescription medicines only as told by your health care provider.  If you were prescribed an antibiotic, take it as told by your health care provider. Do not stop taking the antibiotic even if you start to feel better.  Avoid alcohol, caffeine, tea, and carbonated beverages. They can irritate your bladder.  Drink enough fluid to keep your urine clear or pale yellow.  Keep all follow-up visits as told by your health care provider. This is important.  ImageMake sure to:    Empty your bladder often and completely. Do not hold urine for long periods of time.  Empty your bladder before and after sex.  Wipe from front to back after a bowel movement if you are female. Use each tissue one time when you wipe.    Contact a health care provider if:  You have back pain.  You have a fever.  You feel nauseous or vomit.  Your symptoms do not get better after 3 days.  Your symptoms go away and then return.  Get help right away if:  You have severe back pain or lower abdominal pain.  You are vomiting and cannot keep down any medicines or water.  This information is not intended to replace advice given to you by your health care provider. Make sure you discuss any questions you have with your health care provider.

## 2022-08-24 NOTE — ED PROVIDER NOTE - CLINICAL SUMMARY MEDICAL DECISION MAKING FREE TEXT BOX
87-year-old female presented ED for dysuria increased frequency due to UTI.  No CVA tenderness making Mich Vitas less likely.  Patient given first dose of antibiotics in emergency department discharged home with prescription and strict return precautions.  Patient understands DC home.

## 2022-08-28 DIAGNOSIS — Z79.82 LONG TERM (CURRENT) USE OF ASPIRIN: ICD-10-CM

## 2022-08-28 DIAGNOSIS — N39.0 URINARY TRACT INFECTION, SITE NOT SPECIFIED: ICD-10-CM

## 2022-08-28 DIAGNOSIS — R30.0 DYSURIA: ICD-10-CM

## 2022-08-28 DIAGNOSIS — E11.9 TYPE 2 DIABETES MELLITUS WITHOUT COMPLICATIONS: ICD-10-CM

## 2022-08-28 DIAGNOSIS — Z16.29 RESISTANCE TO OTHER SINGLE SPECIFIED ANTIBIOTIC: ICD-10-CM

## 2022-10-26 RX ORDER — METFORMIN HYDROCHLORIDE 1000 MG/1
1000 TABLET, EXTENDED RELEASE ORAL DAILY
Qty: 90 | Refills: 3 | Status: DISCONTINUED | COMMUNITY
Start: 2021-11-22 | End: 2022-10-26

## 2022-10-27 ENCOUNTER — APPOINTMENT (OUTPATIENT)
Dept: INTERNAL MEDICINE | Facility: CLINIC | Age: 87
End: 2022-10-27

## 2022-10-27 VITALS
SYSTOLIC BLOOD PRESSURE: 174 MMHG | DIASTOLIC BLOOD PRESSURE: 69 MMHG | HEIGHT: 60 IN | HEART RATE: 72 BPM | TEMPERATURE: 97.9 F | BODY MASS INDEX: 26.11 KG/M2 | WEIGHT: 133 LBS | OXYGEN SATURATION: 96 %

## 2022-10-27 DIAGNOSIS — Z12.11 ENCOUNTER FOR SCREENING FOR MALIGNANT NEOPLASM OF COLON: ICD-10-CM

## 2022-10-27 DIAGNOSIS — G47.09 OTHER INSOMNIA: ICD-10-CM

## 2022-10-27 DIAGNOSIS — Z13.220 ENCOUNTER FOR SCREENING FOR LIPOID DISORDERS: ICD-10-CM

## 2022-10-27 DIAGNOSIS — N39.0 URINARY TRACT INFECTION, SITE NOT SPECIFIED: ICD-10-CM

## 2022-10-27 PROCEDURE — G0439: CPT

## 2022-10-27 PROCEDURE — 36415 COLL VENOUS BLD VENIPUNCTURE: CPT

## 2022-10-27 PROCEDURE — 99213 OFFICE O/P EST LOW 20 MIN: CPT | Mod: 25

## 2022-10-27 RX ORDER — MELOXICAM 7.5 MG/1
7.5 TABLET ORAL TWICE DAILY
Qty: 45 | Refills: 1 | Status: DISCONTINUED | COMMUNITY
Start: 2021-08-25 | End: 2022-10-27

## 2022-10-27 RX ORDER — FUROSEMIDE 20 MG/1
20 TABLET ORAL
Qty: 90 | Refills: 0 | Status: DISCONTINUED | COMMUNITY
Start: 2020-07-22 | End: 2022-10-27

## 2022-10-28 PROBLEM — Z12.11 COLON CANCER SCREENING: Status: ACTIVE | Noted: 2022-10-27

## 2022-10-28 PROBLEM — N39.0 RECURRENT UTI: Status: RESOLVED | Noted: 2021-10-12 | Resolved: 2022-10-28

## 2022-10-28 LAB
25(OH)D3 SERPL-MCNC: 64.7 NG/ML
ALBUMIN SERPL ELPH-MCNC: 4.2 G/DL
ALP BLD-CCNC: 84 U/L
ALT SERPL-CCNC: 18 U/L
ANION GAP SERPL CALC-SCNC: 12 MMOL/L
AST SERPL-CCNC: 20 U/L
BASOPHILS # BLD AUTO: 0.07 K/UL
BASOPHILS NFR BLD AUTO: 0.7 %
BILIRUB SERPL-MCNC: 0.3 MG/DL
BUN SERPL-MCNC: 17 MG/DL
CALCIUM SERPL-MCNC: 9.5 MG/DL
CHLORIDE SERPL-SCNC: 102 MMOL/L
CHOLEST SERPL-MCNC: 164 MG/DL
CO2 SERPL-SCNC: 26 MMOL/L
CREAT SERPL-MCNC: 0.72 MG/DL
EGFR: 82 ML/MIN/1.73M2
EOSINOPHIL # BLD AUTO: 0.16 K/UL
EOSINOPHIL NFR BLD AUTO: 1.5 %
ESTIMATED AVERAGE GLUCOSE: 160 MG/DL
GLUCOSE SERPL-MCNC: 135 MG/DL
HBA1C MFR BLD HPLC: 7.2 %
HCT VFR BLD CALC: 35.9 %
HCV AB SER QL: NONREACTIVE
HCV S/CO RATIO: 0.2 S/CO
HDLC SERPL-MCNC: 78 MG/DL
HGB BLD-MCNC: 11.6 G/DL
IMM GRANULOCYTES NFR BLD AUTO: 0.4 %
LDLC SERPL CALC-MCNC: 69 MG/DL
LYMPHOCYTES # BLD AUTO: 1.88 K/UL
LYMPHOCYTES NFR BLD AUTO: 18 %
MAN DIFF?: NORMAL
MCHC RBC-ENTMCNC: 29.6 PG
MCHC RBC-ENTMCNC: 32.3 GM/DL
MCV RBC AUTO: 91.6 FL
MONOCYTES # BLD AUTO: 0.48 K/UL
MONOCYTES NFR BLD AUTO: 4.6 %
NEUTROPHILS # BLD AUTO: 7.83 K/UL
NEUTROPHILS NFR BLD AUTO: 74.8 %
NONHDLC SERPL-MCNC: 86 MG/DL
PLATELET # BLD AUTO: 278 K/UL
POTASSIUM SERPL-SCNC: 4.6 MMOL/L
PROT SERPL-MCNC: 6.6 G/DL
RBC # BLD: 3.92 M/UL
RBC # FLD: 16.1 %
SODIUM SERPL-SCNC: 140 MMOL/L
TRIGL SERPL-MCNC: 82 MG/DL
TSH SERPL-ACNC: 2.88 UIU/ML
WBC # FLD AUTO: 10.46 K/UL

## 2022-11-03 ENCOUNTER — EMERGENCY (EMERGENCY)
Facility: HOSPITAL | Age: 87
LOS: 1 days | Discharge: HOME | End: 2022-11-03
Attending: EMERGENCY MEDICINE | Admitting: EMERGENCY MEDICINE

## 2022-11-03 VITALS
WEIGHT: 132.94 LBS | SYSTOLIC BLOOD PRESSURE: 179 MMHG | OXYGEN SATURATION: 98 % | HEIGHT: 60 IN | RESPIRATION RATE: 18 BRPM | TEMPERATURE: 98 F | DIASTOLIC BLOOD PRESSURE: 89 MMHG | HEART RATE: 84 BPM
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
## 2022-11-03 DIAGNOSIS — M54.50 LOW BACK PAIN, UNSPECIFIED: ICD-10-CM

## 2022-11-03 DIAGNOSIS — Z96.641 PRESENCE OF RIGHT ARTIFICIAL HIP JOINT: ICD-10-CM

## 2022-11-03 DIAGNOSIS — G89.29 OTHER CHRONIC PAIN: ICD-10-CM

## 2022-11-03 DIAGNOSIS — Z96.642 PRESENCE OF LEFT ARTIFICIAL HIP JOINT: Chronic | ICD-10-CM

## 2022-11-03 DIAGNOSIS — E11.9 TYPE 2 DIABETES MELLITUS WITHOUT COMPLICATIONS: ICD-10-CM

## 2022-11-03 DIAGNOSIS — M25.512 PAIN IN LEFT SHOULDER: ICD-10-CM
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
## 2022-11-03 DIAGNOSIS — Z79.84 LONG TERM (CURRENT) USE OF ORAL HYPOGLYCEMIC DRUGS: ICD-10-CM

## 2022-11-03 DIAGNOSIS — M48.56XA COLLAPSED VERTEBRA, NOT ELSEWHERE CLASSIFIED, LUMBAR REGION, INITIAL ENCOUNTER FOR FRACTURE: ICD-10-CM

## 2022-11-03 PROCEDURE — 99284 EMERGENCY DEPT VISIT MOD MDM: CPT

## 2022-11-03 PROCEDURE — 72131 CT LUMBAR SPINE W/O DYE: CPT | Mod: 26

## 2022-11-03 RX ORDER — KETOROLAC TROMETHAMINE 30 MG/ML
30 SYRINGE (ML) INJECTION ONCE
Refills: 0 | Status: DISCONTINUED | OUTPATIENT
Start: 2022-11-03 | End: 2022-11-03

## 2022-11-03 RX ORDER — METHOCARBAMOL 500 MG/1
750 TABLET, FILM COATED ORAL ONCE
Refills: 0 | Status: COMPLETED | OUTPATIENT
Start: 2022-11-03 | End: 2022-11-03

## 2022-11-03 RX ORDER — LIDOCAINE 4 G/100G
1 CREAM TOPICAL ONCE
Refills: 0 | Status: COMPLETED | OUTPATIENT
Start: 2022-11-03 | End: 2022-11-03

## 2022-11-03 RX ADMIN — METHOCARBAMOL 750 MILLIGRAM(S): 500 TABLET, FILM COATED ORAL at 21:58

## 2022-11-03 RX ADMIN — Medication 30 MILLIGRAM(S): at 22:32

## 2022-11-03 RX ADMIN — LIDOCAINE 1 PATCH: 4 CREAM TOPICAL at 21:59

## 2022-11-03 NOTE — ED ADULT TRIAGE NOTE - CHIEF COMPLAINT QUOTE
BIBA. Pt states her back pain has been acting up for the past 3-4 days with trouble walking (Pt uses walker). Pt reports chronic back pain from history of fall. No current injuries.

## 2022-11-03 NOTE — ED ADULT TRIAGE NOTE - PAIN RATING/NUMBER SCALE (0-10): REST
[place] [address] [facility] [facility] [employer]
[place]  [address]
Phone:  [phone]                    ELECTROCARDIOGRAM REPORT      
_______________________________________________________________________________
 
Name:       SHAHNAZ ISMEON             [facility]                     [id number]     Account #:      [id number]  
Admission:  22    Attend Phys:                          
Discharge:  22    Date of Birth:  [birth year]  
                                                          Report #: [id number]
   [id number]
_______________________________________________________________________________
                         [facility] ED
                                       
Test Date:    2022               Test Time:    12:33:00
Pat Name:     SHAHNAZ SIMEON        Department:   
Patient ID:   [id number]            Room:          
Gender:       F                        Technician:   cr
:          2000               Requested By: Gloria Chao
Order Number: [id number] MD:   Deandre Inrgam
                                 Measurements
Intervals                              Axis          
Rate:         62                       P:            22
OK:           247                      QRS:          69
QRSD:         109                      T:            32
QT:           591                                    
QTc:          601                                    
                           Interpretive Statements
Sinus rhythm
Prolonged OK interval
Biatrial enlargement
Nonspecific T wave abnormality
Prolonged QT interval
Compared to ECG 2022 14:51:37
No significant changes found
Electronically Signed On 2022 8:08:09 CST by Deandre Ingram
[email or web address]
 
 
 
 
 
 
 
 
 
 
 
 
 
 
 
 
 
 
  <ELECTRONICALLY SIGNED>
   By: Deandre Ingram MD, [facility]   
  22     0808
D: 22 1233                           _____________________________________
T: 22 1233                           Deandre Ingram MD, [facility]     /EPI
5

## 2022-11-03 NOTE — ED ADULT NURSE NOTE - NSIMPLEMENTINTERV_GEN_ALL_ED
Implemented All Fall Risk Interventions:  [place] to call system. Call bell, personal items and telephone within reach. Instruct patient to call for assistance. Room bathroom lighting operational. Non-slip footwear when patient is off stretcher. Physically safe environment: no spills, clutter or unnecessary equipment. Stretcher in lowest position, wheels locked, appropriate side rails in place. Provide visual cue, wrist band, yellow gown, etc. Monitor gait and stability. Monitor for mental status changes and reorient to person, place, and time. Review medications for side effects contributing to fall risk. Reinforce activity limits and safety measures with patient and family.

## 2022-11-03 NOTE — ED PROVIDER NOTE - CARE PLAN
1 Principal Discharge DX:	Back pain   Principal Discharge DX:	Back pain  Secondary Diagnosis:	Lumbar compression fracture

## 2022-11-03 NOTE — ED PROVIDER NOTE - CLINICAL SUMMARY MEDICAL DECISION MAKING FREE TEXT BOX
86-year-old female with chronic lower back pain with radiculopathy down left leg.  Pain moderate to severe currently moderate and localized to the midline.  Physical exam with point tenderness over L5-L6 negative straight leg test no gross deformities ranging all extremities.  No signs of cord compression on history or exam.  Hypertensive vitals otherwise stable.  Hypertension likely secondary to pain.  - CT lumbar spine to eval for canal stenosis  - Analgesics  - If ambulating, likely d/c with pain medicine and spine follow up

## 2022-11-03 NOTE — ED PROVIDER NOTE - CARE PROVIDER_API CALL
Pravin Gan)  PhysicalRehab Medicine  [address], [address] [place] [address]  Phone: [phone]  Fax: [phone]  Follow Up Time: 1-3 Days

## 2022-11-03 NOTE — ED PROVIDER NOTE - ATTENDING CONTRIBUTION TO CARE
Pt examined by me and I agree with the above assessment & plan.  No evidence of cord compression at this time or acute radiculopathy.  Pain seems to be manageable.  Pt referred to PM&R for follow-up & may even need outpatient MRI and/or IR consultation to see if she is a candidate for vertebroplasty or other interventions.  Advised her that she may benefit from PT as an outpatient.

## 2022-11-03 NOTE — ED PROVIDER NOTE - TOBACCO USE
I approve of requested home care orders.  Also please call patient and see which cholesterol medication she has at home. She needs to be on EITHER simvastatin OR rosuvastatin, but not both. I am ok with either, but prefer to know which one she has at home. I think the change was just a formulary change while in hospital and not intentional.     Other medication alerts are ok and noted.     Yaima Muse MD     Never smoker

## 2022-11-03 NOTE — ED PROVIDER NOTE - PHYSICAL EXAMINATION
GENERAL: non-toxic appearing, alert, in NAD  HEENT: atraumatic, normocephalic, Vision grossly intact, no conjunctivitis or discharge, hearing grossly intact,  no nasal discharge, epistaxis   CARDIAC: RRR, normal S1S2,  no appreciable murmurs, no cyanosis, cap refill < 2 seconds  PULM: normal work of breathing, oxygen saturation on RA wnl, CTAB, no crackles, rales, rhonchi, or wheezing  GI: abdomen nondistended, soft, nontender, no guarding or rebound tenderness, no palpable masses  NEURO: awake and alert, follows commands, normal speech, PERRLA, EOMI, no focal motor or sensory deficits  MSK: point tenderness midline over L5-L6, spine appears normal, Negative straight leg test b/l, Pain with abduction of L shoulder and tenderness over corocoid process with no gross defomrity. no joint swelling or erythema, ranging all extremities with no appreciable loss of ROM  EXT: no peripheral edema, calf tenderness, redness or swelling  SKIN: warm, dry, and intact, no rashes  PSYCH: appropriate mood and affect

## 2022-11-03 NOTE — ED PROVIDER NOTE - NS ED ATTENDING STATEMENT MOD
I have seen and examined this patient and fully participated in the care of this patient as the teaching attending.  The service was shared with the MARINA.  I reviewed and verified the documentation and independently performed the documented:

## 2022-11-03 NOTE — ED PROVIDER NOTE - PATIENT PORTAL LINK FT
You can access the FollowMyHealth Patient Portal offered by [facility] by registering at the following website: [email or web address]. By joining [facility]’s FollowMyHealth portal, you will also be able to view your health information using other applications (apps) compatible with our system.

## 2022-11-03 NOTE — ED PROVIDER NOTE - NSFOLLOWUPINSTRUCTIONS_ED_ALL_ED_FT
A spinal compression fracture is a collapse of the bones that form the spine (vertebrae). With this type of fracture, the vertebrae become pushed (compressed) into a wedge shape. Most compression fractures happen in the middle or lower part of the spine.    What are the causes?  This condition may be caused by:    Thinning and loss of density in the bones (osteoporosis). This is the most common cause.  A fall.  A car or motorcycle accident.  Cancer.  Trauma, such as a heavy, direct hit to the head or back.    What increases the risk?  You are more likely to develop this condition if:    You are 60 years or older.  You have osteoporosis.  You have certain types of cancer, including:    Multiple myeloma.  Lymphoma.  Prostate cancer.  Lung cancer.  Breast cancer.    What are the signs or symptoms?  Symptoms of this condition include:    Severe pain.  Pain that gets worse over time.  Pain that is worse when you stand, walk, sit, or bend.  Sudden pain that is so bad that it is hard for you to move.  Bending or humping of the spine.  Gradual loss of height.  Numbness, tingling, or weakness in the back and legs.  Trouble walking.    Your symptoms will depend on the cause of the fracture and how quickly it develops.    How is this diagnosed?  This condition may be diagnosed based on symptoms, medical history, and a physical exam. During the physical exam, your health care provider may tap along the length of your spine to check for tenderness. Tests may be done to confirm the diagnosis. They may include:    A bone mineral density test to check for osteoporosis.  Imaging tests, such as a spine X-ray, CT scan, or MRI.    How is this treated?  Treatment for this condition depends on the cause and severity of the condition. Some fractures may heal on their own with supportive care. Treatment may include:    Pain medicine.  Rest.  A back brace.  Physical therapy exercises.  Medicine to strengthen bone.  Calcium and vitamin D supplements.    Fractures that cause the back to become misshapen, cause nerve pain or weakness, or do not respond to other treatment may be treated with surgery. This may include:    Vertebroplasty. Bone cement is injected into the collapsed vertebrae to stabilize them.  Balloon kyphoplasty. The collapsed vertebrae are expanded with a balloon and then bone cement is injected into them.  Spinal fusion. The collapsed vertebrae are connected (fused) to normal vertebrae.    Follow these instructions at home:      Medicines    Take over-the-counter and prescription medicines only as told by your health care provider.  Do not drive or operate heavy machinery while taking prescription pain medicine.  If you are taking prescription pain medicine, take actions to prevent or treat constipation. Your health care provider may recommend that you:     Drink enough fluid to keep your urine pale yellow.   Eat foods that are high in fiber, such as fresh fruits and vegetables, whole grains, and beans.   Limit foods that are high in fat and processed sugars, such as fried or sweet foods.   Take an over-the-counter or prescription medicine for constipation.        If you have a brace:    Wear the brace as told by your health care provider. Remove it only as told by your health care provider.   Loosen the brace if your fingers or toes tingle, become numb, or turn cold and blue.  Keep the brace clean.  If the brace is not waterproof:    Do not let it get wet.  Cover it with a watertight covering when you take a bath or a shower.        Managing pain, stiffness, and swelling     If directed, apply ice to the injured area:    If you have a removable brace, remove it as told by your health care provider.  Put ice in a plastic bag.  Place a towel between your skin and the bag.  Leave the ice on for 30 minutes every two hours at first. Then apply the ice as needed.        Activity    Rest as told by your health care provider.     Avoid sitting for a long time without moving. Get up to take short walks every 1–2 hours. This is important to improve blood flow and breathing. Ask for help if you feel weak or unsteady.  Return to your normal activities as directed by your health care provider. Ask what activities are safe for you.  Do exercises to improve motion and strength in your back (physical therapy), as recommended by your health care provider.  Exercise regularly as directed by your health care provider.        General instructions     Do not drink alcohol. Alcohol can interfere with your treatment.  Do not use any products that contain nicotine or tobacco, such as cigarettes and e-cigarettes. These can delay bone healing. If you need help quitting, ask your health care provider.  Keep all follow-up visits as told by your health care provider. This is important. It can help to prevent permanent injury, disability, and long-lasting (chronic) pain.    Contact a health care provider if:  You have a fever.  You develop a cough that makes your pain worse.  Your pain medicine is not helping.  Your pain does not get better over time.  You cannot return to your normal activities as planned or expected.    Get help right away if:  Your pain is very bad and it suddenly gets worse.  You are unable to move any body part (paralysis) that is below the level of your injury.  You have numbness, tingling, or weakness in any body part that is below the level of your injury.  You cannot control your bladder or bowels.    Summary  A spinal compression fracture is a collapse of the bones that form the spine (vertebrae).  With this type of fracture, the vertebrae become pushed (compressed) into a wedge shape.  Your symptoms and treatment will depend on the cause and severity of the fracture and how quickly it develops.  Some fractures may heal on their own with supportive care. Fractures that cause the back to become misshapen, cause nerve pain or weakness, or do not respond to other treatment may be treated with surgery.

## 2022-11-03 NOTE — ED ADULT NURSE NOTE - OBJECTIVE STATEMENT
Pt c/o left lower back for 6 days. Denies any trauma or recent falls. Also c/o left shoulder pain with movement, states she thinks she slept on it wrong. H/o chronic leg pain,  ambulates with walker.

## 2022-11-03 NOTE — ED PROVIDER NOTE - OBJECTIVE STATEMENT
87-year-old female with past medical history of diabetes on metformin no other medical conditions presenting with 2 to 3 weeks of worsening lower back pain radiating down the left thigh.  No inciting events denies any recent trauma.  Able to ambulate denies any lower extremity weakness numbness saddle anesthesia or urinary incontinence.  Has been taking Motrin at home with little relief.  Pain moderate to severe in intensity aching in quality with no other modifying factors.  Patient also complains of left shoulder soreness after sleeping on it last night.  Reports still able to range no loss of sensation no other trauma.

## 2022-11-04 VITALS
HEART RATE: 73 BPM | OXYGEN SATURATION: 98 % | RESPIRATION RATE: 18 BRPM | TEMPERATURE: 98 F | DIASTOLIC BLOOD PRESSURE: 73 MMHG | SYSTOLIC BLOOD PRESSURE: 171 MMHG

## 2022-11-04 RX ORDER — MELOXICAM 15 MG/1
1 TABLET ORAL
Qty: 30 | Refills: 0
Start: 2022-11-04

## 2022-11-04 RX ORDER — METHOCARBAMOL 500 MG/1
1 TABLET, FILM COATED ORAL
Qty: 30 | Refills: 0
Start: 2022-11-04

## 2022-11-04 NOTE — ED ADULT NURSE REASSESSMENT NOTE - NS ED NURSE REASSESS COMMENT FT1
Pt ambulated to nurse with steady gait station requesting something to eat. MD verbalized pt may eat while waiting for results. Meal and PO fluids provided at this time.

## 2022-11-16 NOTE — BRIEF OPERATIVE NOTE - PRE-OP DX
Cholecystitis  10/22/2017    Active  Breanna Jones Topical Sulfur Applications Counseling: Topical Sulfur Counseling: Patient counseled that this medication may cause skin irritation or allergic reactions.  In the event of skin irritation, the patient was advised to reduce the amount of the drug applied or use it less frequently.   The patient verbalized understanding of the proper use and possible adverse effects of topical sulfur application.  All of the patient's questions and concerns were addressed.

## 2022-11-19 ENCOUNTER — EMERGENCY (EMERGENCY)
Facility: HOSPITAL | Age: 87
LOS: 1 days | Discharge: HOME | End: 2022-11-19
Admitting: EMERGENCY MEDICINE

## 2022-11-19 VITALS
TEMPERATURE: 98 F | WEIGHT: 145.06 LBS | RESPIRATION RATE: 18 BRPM | DIASTOLIC BLOOD PRESSURE: 76 MMHG | OXYGEN SATURATION: 99 % | HEIGHT: 60 IN | HEART RATE: 82 BPM | SYSTOLIC BLOOD PRESSURE: 189 MMHG

## 2022-11-19 DIAGNOSIS — Z96.642 PRESENCE OF LEFT ARTIFICIAL HIP JOINT: Chronic | ICD-10-CM

## 2022-11-19 LAB
APPEARANCE UR: CLEAR — SIGNIFICANT CHANGE UP
BILIRUB UR-MCNC: NEGATIVE — SIGNIFICANT CHANGE UP
COLOR SPEC: YELLOW — SIGNIFICANT CHANGE UP
DIFF PNL FLD: NEGATIVE — SIGNIFICANT CHANGE UP
EPI CELLS # UR: ABNORMAL /HPF (ref 0–5)
GLUCOSE UR QL: NEGATIVE — SIGNIFICANT CHANGE UP
KETONES UR-MCNC: ABNORMAL MG/DL
LEUKOCYTE ESTERASE UR-ACNC: ABNORMAL
NITRITE UR-MCNC: NEGATIVE — SIGNIFICANT CHANGE UP
PH UR: 6 — SIGNIFICANT CHANGE UP (ref 5–8)
PROT UR-MCNC: NEGATIVE MG/DL — SIGNIFICANT CHANGE UP
RBC CASTS # UR COMP ASSIST: < 5 /HPF — SIGNIFICANT CHANGE UP
SP GR SPEC: 1.02 — SIGNIFICANT CHANGE UP (ref 1–1.03)
UROBILINOGEN FLD QL: 0.2 E.U./DL — SIGNIFICANT CHANGE UP
WBC UR QL: ABNORMAL /HPF

## 2022-11-19 PROCEDURE — 72131 CT LUMBAR SPINE W/O DYE: CPT | Mod: 26

## 2022-11-19 PROCEDURE — 72192 CT PELVIS W/O DYE: CPT | Mod: 26

## 2022-11-19 PROCEDURE — 99284 EMERGENCY DEPT VISIT MOD MDM: CPT

## 2022-11-19 RX ORDER — ACETAMINOPHEN 500 MG
650 TABLET ORAL ONCE
Refills: 0 | Status: COMPLETED | OUTPATIENT
Start: 2022-11-19 | End: 2022-11-19

## 2022-11-19 RX ORDER — ACETAMINOPHEN 500 MG
1 TABLET ORAL
Qty: 30 | Refills: 0
Start: 2022-11-19 | End: 2022-11-29

## 2022-11-19 RX ORDER — METHOCARBAMOL 500 MG/1
1 TABLET, FILM COATED ORAL
Qty: 28 | Refills: 0
Start: 2022-11-19 | End: 2022-11-26

## 2022-11-19 RX ORDER — ACETAMINOPHEN 500 MG
1 TABLET ORAL
Qty: 30 | Refills: 0
Start: 2022-11-19 | End: 2022-11-28

## 2022-11-19 RX ORDER — METHOCARBAMOL 500 MG/1
750 TABLET, FILM COATED ORAL ONCE
Refills: 0 | Status: COMPLETED | OUTPATIENT
Start: 2022-11-19 | End: 2022-11-19

## 2022-11-19 RX ORDER — LIDOCAINE 4 G/100G
1 CREAM TOPICAL ONCE
Refills: 0 | Status: COMPLETED | OUTPATIENT
Start: 2022-11-19 | End: 2022-11-19

## 2022-11-19 RX ORDER — METHOCARBAMOL 500 MG/1
1 TABLET, FILM COATED ORAL
Qty: 28 | Refills: 0
Start: 2022-11-19 | End: 2022-11-25

## 2022-11-19 RX ADMIN — LIDOCAINE 1 PATCH: 4 CREAM TOPICAL at 18:36

## 2022-11-19 RX ADMIN — METHOCARBAMOL 750 MILLIGRAM(S): 500 TABLET, FILM COATED ORAL at 20:20

## 2022-11-19 RX ADMIN — Medication 650 MILLIGRAM(S): at 20:20

## 2022-11-19 NOTE — ED ADULT NURSE NOTE - CADM POA PRESS ULCER
Providers





- Providers


Date of Admission: 


03/23/22 16:11





Date of discharge: 03/24/22


Attending physician: 


AMY J KOCHERLA





                                        





03/24/22 07:16


Consult to Physician [CONS] Routine 


   Comment: 


   Consulting Provider: GUNJAN ZAPATA


   Physician Instructions: 


   Reason For Exam: CHF exacerbation











Primary care physician: 


PRIMARY CARE MD








Hospitalization


Condition: Stable


Hospital course: 


(1) Acute exacerbation of CHF (congestive heart failure)


Patient initiated on IV Lasix and potassium


Echocardiogram for ejection fraction


Strict I and O's


Daily weights


Control blood pressure








(2) Acute coronary syndrome


Current Visit: Yes   Status: Acute   


Plan to address problem: 


Serial troponins and Lexiscan in the morning








(3) Hypertension


Current Visit: Yes   Status: Chronic   


Qualifiers: 


   Hypertension type: primary hypertension   Qualified Code(s): I10 - Essential 

(primary) hypertension   


Plan to address problem: 


Continue antihypertensives and adjust medications








(4) Peripheral edema


Current Visit: Yes   Status: Acute   


Plan to address problem: 


IV Lasix for now








(5) DVT prophylaxis


Current Visit: Yes   Status: Acute   


Plan to address problem: 


On heparin and GI prophylaxis








(6) Advance care planning


Current Visit: Yes   Status: Acute   


Plan to address problem: 


Disease education conducted, care plan discussed, diagnosis discussed, prognosis

 discussed.  Patient is full code.  Patient acknowledges understanding and 

agreement with care plan.  +30 minutes.











Disposition: 30 STILL A PATIENT





Core Measure Documentation





- Palliative Care


Palliative Care/ Comfort Measures: Not Applicable





- Core Measures


Any of the following diagnoses?: none





Exam





- Constitutional


Vitals: 


                                        











Temp Pulse Resp BP Pulse Ox


 


 98.8 F   67   18   151/90   99 


 


 03/24/22 12:29  03/24/22 12:32  03/24/22 12:28  03/24/22 12:32  03/24/22 12:28











General appearance: Present: no acute distress, well-nourished





- EENT


Eyes: Present: PERRL, EOM intact





- Neck


Neck: Present: supple, normal ROM





- Respiratory


Respiratory effort: normal


Respiratory: bilateral: diminished, negative: rales, rhonchi, wheezing





- Cardiovascular


Rhythm: regular


Heart Sounds: Present: S1 & S2





- Extremities


Extremities: no ischemia, No edema





- Abdominal


General gastrointestinal: Present: soft, non-tender, non-distended, normal bowel

 sounds





- Integumentary


Integumentary: Present: clear, warm





- Musculoskeletal


Musculoskeletal: strength equal bilaterally, generalized weakness





- Psychiatric


Psychiatric: appropriate mood/affect, cooperative





Plan


Activity: advance as tolerated


Diet: low salt, other (Cardiac diet)


Additional Instructions: Strongly advised to comply with medications diet and 

follow-up visits.  Advised low-sodium diet and fluid restriction.  If you have 

worsening symptoms contact MD or go to the nearest emergency room.  Advised 

dietary modification, exercise as tolerated and weight reduction when you are 

medically stable.  Advised to follow with primary care physician 1 week


Follow up with: 


PRIMARY CARE,MD [Primary Care Provider] - 3-5 Days


Prescriptions: 


carvediloL [Coreg] 3.125 mg PO BID #60 tablet


Losartan [Cozaar] 25 mg PO QDAY #30 tablet


Potassium Chloride [K-Dur] 20 meq PO DAILY #30 tablet


Furosemide [Lasix TAB] 40 mg PO BID #60 tablet
No

## 2022-11-19 NOTE — ED PROVIDER NOTE - PHYSICAL EXAMINATION
VITAL SIGNS: I have reviewed nursing notes and confirm.  CONSTITUTIONAL: Well-developed; well-nourished; in no acute distress.  SKIN: Skin is warm and dry, no acute rash.  NECK: Supple; non tender.  SPINE: +ttp along the midline L2/3 region and R paraspinal at that same level; dec ROM of the spine 2/2 pain  EXT: Normal ROM. No clubbing, cyanosis or edema.  NEURO: Alert, oriented. Grossly unremarkable. CAMPO, normal tone, no gross motor or sensory changes. Fluent speech.   PSYCH: Cooperative, appropriate. Mood and affect wnl. VITAL SIGNS: I have reviewed nursing notes and confirm.  CONSTITUTIONAL: Well-developed; well-nourished; in no acute distress.  SKIN: Skin is warm and dry, no acute rash.  NECK: Supple; non tender.  SPINE: +ttp along the midline L2/3 region and R paraspinal at that same level; dec ROM of the spine 2/2 pain; +ttp along the R lateral hip, FROM of the virgen hips  EXT: Normal ROM. No clubbing, cyanosis or edema.  NEURO: Alert, oriented. Grossly unremarkable. CAMPO, normal tone, no gross motor or sensory changes. Fluent speech.   PSYCH: Cooperative, appropriate. Mood and affect wnl.

## 2022-11-19 NOTE — ED PROVIDER NOTE - CARE PROVIDERS DIRECT ADDRESSES
[email or web address]

## 2022-11-19 NOTE — ED PROVIDER NOTE - NS ED ROS FT
+back pain  Denies fevers, chills, nausea, vomiting, diarrhea, constipation, abdominal pain, urinary symptoms, chest pain, palpitations, shortness of breath, dyspnea on exertion, syncope/near syncope, cough/URI symptoms, headache, weakness, numbness, focal deficits, visual changes, gait or balance changes, dizziness

## 2022-11-19 NOTE — ED PROVIDER NOTE - CLINICAL SUMMARY MEDICAL DECISION MAKING FREE TEXT BOX
88 yo F, pmhx DM, presenting to the ER c/o low back pain x 2 weeks. Patient found to have ttp along the lumbar and paraspinal spine regions. Will plan to obtain pelvis/lumbar CT since it will be difficult for patient to stand for XR. Will send UA and treat w/ percocet and lidocaine patch. Plan to have patient f/u w/ spine [or neurosx] for further mgmt. Dispo pending clinical w/u.

## 2022-11-19 NOTE — ED ADULT TRIAGE NOTE - CHIEF COMPLAINT QUOTE
reports lower back pain from a fall 2 weeks ago- pt ambulates with a walker. denies any new injury or trauma

## 2022-11-19 NOTE — ED PROVIDER NOTE - OBJECTIVE STATEMENT
86 yo F, pmhx DM, presenting to the ER c/o low back pain x 2 weeks. 3 weeks ago, patient was disgnoased w/ a compression fracture of un 86 yo F, pmhx DM, presenting to the ER c/o low back pain x 2 weeks. 3 weeks ago, patient was diagnosed w/ 50% anterior wedge compression fracture of L2 of uncertain age. She reports initially feeling ok after her visit until a week later when she began to have pain. It has been increasing since. Pt will sometimes take Ibuprofen for pain relief. She denies bowel/bladder dysfunction, weakness, paresthesias, new falls, headaches, dizziness, chest pain, or SOB.

## 2022-11-19 NOTE — ED PROVIDER NOTE - PATIENT PORTAL LINK FT
You can access the FollowMyHealth Patient Portal offered by [facility] by registering at the following website: [email or web address]. By joining [facility]’s FollowMyHealth portal, you will also be able to view your health information using other applications (apps) compatible with our system.

## 2022-11-19 NOTE — ED PROVIDER NOTE - PROVIDER TOKENS
PROVIDER:[TOKEN:[[id number]],FOLLOWUP:[1-3 Days]],PROVIDER:[TOKEN:[[id number]],FOLLOWUP:[1-3 Days]],PROVIDER:[TOKEN:[[id number]],FOLLOWUP:[1-3 Days]]

## 2022-11-19 NOTE — ED PROVIDER NOTE - CARE PROVIDER_API CALL
Deandre Slaughter)  Neurosurgery  [address] [place] [address]  Phone: [phone]  Fax: [phone]  Follow Up Time: 1-3 Days    Pablo Colmenares; MS)  Neurosurgery  [address] [place] [address]  Phone: [phone]  Fax: [phone]  Follow Up Time: 1-3 Days    Abilio Hroan)  Neurosurgery  [address] [place] [address]  Phone: [phone]  Fax: [phone]  Follow Up Time: 1-3 Days

## 2022-11-19 NOTE — ED PROVIDER NOTE - PROGRESS NOTE DETAILS
CT shows no acute worsening of compression fracture; +degenerative changes likely also contributing to pt's exacerbation of pain. She was given percocet and a lidocaine patch which she states made her sleepy but did not help the pain. She was added methocarbomol and tylenol which patient states improved her pain. Will give patient names of neurosx(s) to follow up w/ for further mgmt. UA reviewed, +5-10 WBCs however no bacteria and epithelial cells present. Pt does not endorse urinary symptoms, possible contaminant; UCx pending.   Pt stable on DC and ambulates out w/ her walked in NAD.

## 2022-11-21 DIAGNOSIS — Z79.82 LONG TERM (CURRENT) USE OF ASPIRIN: ICD-10-CM

## 2022-11-21 DIAGNOSIS — Z96.641 PRESENCE OF RIGHT ARTIFICIAL HIP JOINT: ICD-10-CM

## 2022-11-21 DIAGNOSIS — M54.50 LOW BACK PAIN, UNSPECIFIED: ICD-10-CM

## 2022-11-21 DIAGNOSIS — E11.9 TYPE 2 DIABETES MELLITUS WITHOUT COMPLICATIONS: ICD-10-CM

## 2022-11-21 LAB
CULTURE RESULTS: SIGNIFICANT CHANGE UP
SPECIMEN SOURCE: SIGNIFICANT CHANGE UP

## 2022-11-23 ENCOUNTER — APPOINTMENT (OUTPATIENT)
Dept: NEUROLOGY | Facility: CLINIC | Age: 87
End: 2022-11-23

## 2022-11-23 VITALS
TEMPERATURE: 97.4 F | WEIGHT: 124 LBS | HEIGHT: 60 IN | BODY MASS INDEX: 24.35 KG/M2 | DIASTOLIC BLOOD PRESSURE: 81 MMHG | SYSTOLIC BLOOD PRESSURE: 187 MMHG | HEART RATE: 86 BPM | OXYGEN SATURATION: 99 %

## 2022-11-23 PROCEDURE — 99203 OFFICE O/P NEW LOW 30 MIN: CPT

## 2023-03-02 ENCOUNTER — APPOINTMENT (OUTPATIENT)
Dept: NEUROSURGERY | Facility: CLINIC | Age: 88
End: 2023-03-02
Payer: MEDICARE

## 2023-03-02 VITALS
HEART RATE: 90 BPM | WEIGHT: 117 LBS | BODY MASS INDEX: 22.97 KG/M2 | RESPIRATION RATE: 99 BRPM | DIASTOLIC BLOOD PRESSURE: 79 MMHG | OXYGEN SATURATION: 99 % | TEMPERATURE: 97.5 F | SYSTOLIC BLOOD PRESSURE: 183 MMHG | HEIGHT: 60 IN

## 2023-03-02 DIAGNOSIS — M48.061 SPINAL STENOSIS, LUMBAR REGION WITHOUT NEUROGENIC CLAUDICATION: ICD-10-CM

## 2023-03-02 DIAGNOSIS — M47.26 OTHER SPONDYLOSIS WITH RADICULOPATHY, LUMBAR REGION: ICD-10-CM

## 2023-03-02 PROCEDURE — 99204 OFFICE O/P NEW MOD 45 MIN: CPT

## 2023-03-23 ENCOUNTER — APPOINTMENT (OUTPATIENT)
Dept: INTERNAL MEDICINE | Facility: CLINIC | Age: 88
End: 2023-03-23
Payer: MEDICARE

## 2023-03-23 VITALS
SYSTOLIC BLOOD PRESSURE: 131 MMHG | WEIGHT: 113 LBS | HEART RATE: 72 BPM | TEMPERATURE: 98.1 F | HEIGHT: 60 IN | OXYGEN SATURATION: 97 % | BODY MASS INDEX: 22.19 KG/M2 | DIASTOLIC BLOOD PRESSURE: 56 MMHG

## 2023-03-23 DIAGNOSIS — G89.29 OTHER CHRONIC PAIN: ICD-10-CM

## 2023-03-23 DIAGNOSIS — R09.89 OTHER SPECIFIED SYMPTOMS AND SIGNS INVOLVING THE CIRCULATORY AND RESPIRATORY SYSTEMS: ICD-10-CM

## 2023-03-23 DIAGNOSIS — S32.009A UNSPECIFIED FRACTURE OF UNSPECIFIED LUMBAR VERTEBRA, INITIAL ENCOUNTER FOR CLOSED FRACTURE: ICD-10-CM

## 2023-03-23 PROCEDURE — 99214 OFFICE O/P EST MOD 30 MIN: CPT | Mod: 25

## 2023-03-23 PROCEDURE — 36415 COLL VENOUS BLD VENIPUNCTURE: CPT

## 2023-03-23 RX ORDER — BLOOD SUGAR DIAGNOSTIC
STRIP MISCELLANEOUS TWICE DAILY
Qty: 2 | Refills: 0 | Status: ACTIVE | COMMUNITY
Start: 2023-03-23 | End: 1900-01-01

## 2023-03-23 RX ORDER — BLOOD-GLUCOSE METER
W/DEVICE EACH MISCELLANEOUS
Qty: 1 | Refills: 0 | Status: ACTIVE | COMMUNITY
Start: 2023-03-23 | End: 1900-01-01

## 2023-03-23 RX ORDER — LANCETS 33 GAUGE
EACH MISCELLANEOUS
Qty: 2 | Refills: 0 | Status: ACTIVE | COMMUNITY
Start: 2023-03-23 | End: 1900-01-01

## 2023-03-23 NOTE — ASSESSMENT
[FreeTextEntry1] : 1.)  HTN\par BP taken x3 in office today.\par 131/56.  120/55.  115/60.\par Reviewed prior recorded BPs.  These have ranged between 102 and 135 systolic except once in November when it was 180/80. \par  Patient advised that she does not have essential hypertension and that BP medications are not indicated as they may cause hypotension in her case.\par Explained that she does have labile hypertensive blood pressure excursions, especially when in doctor's office prior to procedure but these typically resolve quickly.\par Recommend that she discuss with pain medicine specialist when she is next scheduled for epidural injection that, if her BP is initially elevated, it should be repeated in 15 minutes after she has had a chance to calm down and that it should always be taken manually.  Based on her records so far, her repeat BP will almost certainly be acceptable to proceed with the procedure.  She indicates understanding.\par \par #2) T2DM\par Reviewed serial serum glucose and hemoglobin A1c over the past year.  Serum glucose has ranged between 135 and 176.  Hemoglobin A1c was 7.2 in October and 6.9 last March, in both cases indicating excellent control (desired ranges under 7.5)\par Explained that, because she has type 2 diabetes and is not taking insulin, home glucose monitoring is not necessary as it will not affect her treatment regimen which will remain the same on a daily basis no matter what the results are.  Also it is likely to be confusing for her.\par After further discussion and answering patient's questions, she states she really does want to have home monitor.  Prescription for this was submitted to her pharmacy and brief instructions for use were reviewed.\par Patient advised to return to office for further instruction if she feels this will be necessary.\par In the meantime, explained that it is essential for her to resume and maintain compliance with metformin.\par Will prescribe extended release formulation so that she only has to remember to take it once daily.\par Prescription for metformin XL 1000 mg was submitted to her pharmacy with instructions for use (once daily with evening meal).\par If necessary in future, additional oral medication can be added (i.e., Jardiance) but so far, based on A1c results, this has not been necessary.\par \par #3) Chronic back pain due to multiple vertebral fractures\par Follow-up with pain management.\par Refill for gabapentin and for tramadol offered but declined by patient at this time.

## 2023-03-23 NOTE — HISTORY OF PRESENT ILLNESS
[FreeTextEntry1] : T2DM\par Chronic back pain\par Labile BP [de-identified] : Patient reports that epidural steroid injections previously scheduled were canceled due to high blood pressure 1 time and because of hyperglycemia another time.  States she was told she has to have a home blood sugar monitoring kit.\par On questioning, patient concedes that she has been partially noncompliant with metformin as previously prescribed.\par Remains in severe lower back pain due to vertebral fractures.  Being treated with medication by her pain specialist but she is not aware which one she is taking.  Was previously prescribed gabapentin but discontinued.

## 2023-03-24 LAB
ALBUMIN SERPL ELPH-MCNC: 4.3 G/DL
ALP BLD-CCNC: 71 U/L
ALT SERPL-CCNC: 6 U/L
ANION GAP SERPL CALC-SCNC: 14 MMOL/L
AST SERPL-CCNC: 11 U/L
BILIRUB SERPL-MCNC: 0.3 MG/DL
BUN SERPL-MCNC: 14 MG/DL
CALCIUM SERPL-MCNC: 9.8 MG/DL
CHLORIDE SERPL-SCNC: 102 MMOL/L
CO2 SERPL-SCNC: 23 MMOL/L
CREAT SERPL-MCNC: 0.7 MG/DL
EGFR: 85 ML/MIN/1.73M2
ESTIMATED AVERAGE GLUCOSE: 140 MG/DL
GLUCOSE SERPL-MCNC: 118 MG/DL
HBA1C MFR BLD HPLC: 6.5 %
POTASSIUM SERPL-SCNC: 4.6 MMOL/L
PROT SERPL-MCNC: 5.9 G/DL
SODIUM SERPL-SCNC: 139 MMOL/L

## 2023-04-20 NOTE — PHYSICAL THERAPY INITIAL EVALUATION ADULT - PATIENT/FAMILY/SIGNIFICANT OTHER GOALS STATEMENT, PT EVAL
"I want to go to the bathroom" Elliptical Excision Additional Text (Leave Blank If You Do Not Want): The margin was drawn around the clinically apparent lesion.  An elliptical shape was then drawn on the skin incorporating the lesion and margins.  Incisions were then made along these lines to the appropriate tissue plane and the lesion was extirpated.

## 2023-05-11 VITALS
RESPIRATION RATE: 18 BRPM | WEIGHT: 149.91 LBS | DIASTOLIC BLOOD PRESSURE: 71 MMHG | HEART RATE: 94 BPM | SYSTOLIC BLOOD PRESSURE: 159 MMHG | HEIGHT: 60 IN | OXYGEN SATURATION: 97 % | TEMPERATURE: 98 F

## 2023-05-11 LAB
ALBUMIN SERPL ELPH-MCNC: 3.9 G/DL — SIGNIFICANT CHANGE UP (ref 3.4–5)
ALP SERPL-CCNC: 79 U/L — SIGNIFICANT CHANGE UP (ref 40–120)
ALT FLD-CCNC: 18 U/L — SIGNIFICANT CHANGE UP (ref 12–42)
ANION GAP SERPL CALC-SCNC: 9 MMOL/L — SIGNIFICANT CHANGE UP (ref 9–16)
APTT BLD: 24.4 SEC — LOW (ref 27.5–35.5)
AST SERPL-CCNC: 19 U/L — SIGNIFICANT CHANGE UP (ref 15–37)
BASOPHILS # BLD AUTO: 0.08 K/UL — SIGNIFICANT CHANGE UP (ref 0–0.2)
BASOPHILS NFR BLD AUTO: 0.4 % — SIGNIFICANT CHANGE UP (ref 0–2)
BILIRUB SERPL-MCNC: 0.4 MG/DL — SIGNIFICANT CHANGE UP (ref 0.2–1.2)
BUN SERPL-MCNC: 23 MG/DL — SIGNIFICANT CHANGE UP (ref 7–23)
CALCIUM SERPL-MCNC: 9.2 MG/DL — SIGNIFICANT CHANGE UP (ref 8.5–10.5)
CHLORIDE SERPL-SCNC: 105 MMOL/L — SIGNIFICANT CHANGE UP (ref 96–108)
CO2 SERPL-SCNC: 26 MMOL/L — SIGNIFICANT CHANGE UP (ref 22–31)
CREAT SERPL-MCNC: 0.99 MG/DL — SIGNIFICANT CHANGE UP (ref 0.5–1.3)
EGFR: 56 ML/MIN/1.73M2 — LOW
EOSINOPHIL # BLD AUTO: 0.04 K/UL — SIGNIFICANT CHANGE UP (ref 0–0.5)
EOSINOPHIL NFR BLD AUTO: 0.2 % — SIGNIFICANT CHANGE UP (ref 0–6)
GLUCOSE BLDC GLUCOMTR-MCNC: 223 MG/DL — HIGH (ref 70–99)
GLUCOSE SERPL-MCNC: 245 MG/DL — HIGH (ref 70–99)
HCT VFR BLD CALC: 32.7 % — LOW (ref 34.5–45)
HGB BLD-MCNC: 10.4 G/DL — LOW (ref 11.5–15.5)
IMM GRANULOCYTES NFR BLD AUTO: 0.8 % — SIGNIFICANT CHANGE UP (ref 0–0.9)
INR BLD: 0.97 — SIGNIFICANT CHANGE UP (ref 0.88–1.16)
LYMPHOCYTES # BLD AUTO: 1.28 K/UL — SIGNIFICANT CHANGE UP (ref 1–3.3)
LYMPHOCYTES # BLD AUTO: 6.4 % — LOW (ref 13–44)
MCHC RBC-ENTMCNC: 29.6 PG — SIGNIFICANT CHANGE UP (ref 27–34)
MCHC RBC-ENTMCNC: 31.8 GM/DL — LOW (ref 32–36)
MCV RBC AUTO: 93.2 FL — SIGNIFICANT CHANGE UP (ref 80–100)
MONOCYTES # BLD AUTO: 0.62 K/UL — SIGNIFICANT CHANGE UP (ref 0–0.9)
MONOCYTES NFR BLD AUTO: 3.1 % — SIGNIFICANT CHANGE UP (ref 2–14)
NEUTROPHILS # BLD AUTO: 17.69 K/UL — HIGH (ref 1.8–7.4)
NEUTROPHILS NFR BLD AUTO: 89.1 % — HIGH (ref 43–77)
NRBC # BLD: 0 /100 WBCS — SIGNIFICANT CHANGE UP (ref 0–0)
PLATELET # BLD AUTO: 328 K/UL — SIGNIFICANT CHANGE UP (ref 150–400)
POTASSIUM SERPL-MCNC: 3.9 MMOL/L — SIGNIFICANT CHANGE UP (ref 3.5–5.3)
POTASSIUM SERPL-SCNC: 3.9 MMOL/L — SIGNIFICANT CHANGE UP (ref 3.5–5.3)
PROT SERPL-MCNC: 6.7 G/DL — SIGNIFICANT CHANGE UP (ref 6.4–8.2)
PROTHROM AB SERPL-ACNC: 11.2 SEC — SIGNIFICANT CHANGE UP (ref 10.5–13.4)
RBC # BLD: 3.51 M/UL — LOW (ref 3.8–5.2)
RBC # FLD: 18.2 % — HIGH (ref 10.3–14.5)
SODIUM SERPL-SCNC: 140 MMOL/L — SIGNIFICANT CHANGE UP (ref 132–145)
WBC # BLD: 19.86 K/UL — HIGH (ref 3.8–10.5)
WBC # FLD AUTO: 19.86 K/UL — HIGH (ref 3.8–10.5)

## 2023-05-11 PROCEDURE — 73562 X-RAY EXAM OF KNEE 3: CPT | Mod: 26,LT

## 2023-05-11 PROCEDURE — 99285 EMERGENCY DEPT VISIT HI MDM: CPT

## 2023-05-11 PROCEDURE — 73502 X-RAY EXAM HIP UNI 2-3 VIEWS: CPT | Mod: 26,LT

## 2023-05-11 PROCEDURE — 71045 X-RAY EXAM CHEST 1 VIEW: CPT | Mod: 26

## 2023-05-11 PROCEDURE — 73552 X-RAY EXAM OF FEMUR 2/>: CPT | Mod: 26,LT

## 2023-05-11 RX ORDER — FENTANYL CITRATE 50 UG/ML
25 INJECTION INTRAVENOUS ONCE
Refills: 0 | Status: DISCONTINUED | OUTPATIENT
Start: 2023-05-11 | End: 2023-05-11

## 2023-05-11 RX ORDER — MORPHINE SULFATE 50 MG/1
4 CAPSULE, EXTENDED RELEASE ORAL ONCE
Refills: 0 | Status: DISCONTINUED | OUTPATIENT
Start: 2023-05-11 | End: 2023-05-11

## 2023-05-11 RX ADMIN — FENTANYL CITRATE 25 MICROGRAM(S): 50 INJECTION INTRAVENOUS at 23:31

## 2023-05-11 RX ADMIN — MORPHINE SULFATE 4 MILLIGRAM(S): 50 CAPSULE, EXTENDED RELEASE ORAL at 21:53

## 2023-05-11 RX ADMIN — MORPHINE SULFATE 4 MILLIGRAM(S): 50 CAPSULE, EXTENDED RELEASE ORAL at 20:17

## 2023-05-11 NOTE — ED ADULT NURSE NOTE - OBJECTIVE STATEMENT
Pt reports the door to her building is heavy and frequently gets stuck, pt was walking through doorway and the door was open but then jerked and hit her in the back. Pt fell forward, struck head and landed on left leg. Pt denies LOC, no AC use. Pt with pain and difficulty moving left leg.

## 2023-05-11 NOTE — ED ADULT NURSE NOTE - NSFALLRISKFACTORS_ED_ALL_ED
No indicators present Age: 87 years old or older/Bone Condition: Including osteoporosis, prolonged steroid use or metastatic bone disease/cancer

## 2023-05-11 NOTE — ED ADULT TRIAGE NOTE - CHIEF COMPLAINT QUOTE
BIBA s/p fall while entering home c/o L leg pain and facial pain due to falling forward. no LOC. appears comfortable

## 2023-05-11 NOTE — ED ADULT NURSE NOTE - NSFALLHARMRISKINTERV_ED_ALL_ED

## 2023-05-12 ENCOUNTER — INPATIENT (INPATIENT)
Facility: HOSPITAL | Age: 88
LOS: 4 days | Discharge: NURSING, REHAB OR LONG-TERM CARE | DRG: 535 | End: 2023-05-17
Attending: GENERAL ACUTE CARE HOSPITAL | Admitting: INTERNAL MEDICINE
Payer: MEDICARE

## 2023-05-12 DIAGNOSIS — Z29.9 ENCOUNTER FOR PROPHYLACTIC MEASURES, UNSPECIFIED: ICD-10-CM

## 2023-05-12 DIAGNOSIS — D72.829 ELEVATED WHITE BLOOD CELL COUNT, UNSPECIFIED: ICD-10-CM

## 2023-05-12 DIAGNOSIS — I10 ESSENTIAL (PRIMARY) HYPERTENSION: ICD-10-CM

## 2023-05-12 DIAGNOSIS — Z96.643 PRESENCE OF ARTIFICIAL HIP JOINT, BILATERAL: Chronic | ICD-10-CM

## 2023-05-12 DIAGNOSIS — E11.9 TYPE 2 DIABETES MELLITUS WITHOUT COMPLICATIONS: ICD-10-CM

## 2023-05-12 DIAGNOSIS — S72.009A FRACTURE OF UNSPECIFIED PART OF NECK OF UNSPECIFIED FEMUR, INITIAL ENCOUNTER FOR CLOSED FRACTURE: ICD-10-CM

## 2023-05-12 LAB
APPEARANCE UR: CLEAR — SIGNIFICANT CHANGE UP
BILIRUB UR-MCNC: NEGATIVE — SIGNIFICANT CHANGE UP
COLOR SPEC: YELLOW — SIGNIFICANT CHANGE UP
DIFF PNL FLD: NEGATIVE — SIGNIFICANT CHANGE UP
GLUCOSE BLDC GLUCOMTR-MCNC: 137 MG/DL — HIGH (ref 70–99)
GLUCOSE BLDC GLUCOMTR-MCNC: 169 MG/DL — HIGH (ref 70–99)
GLUCOSE BLDC GLUCOMTR-MCNC: 186 MG/DL — HIGH (ref 70–99)
GLUCOSE UR QL: 100
HCT VFR BLD CALC: 31.6 % — LOW (ref 34.5–45)
HGB BLD-MCNC: 10.1 G/DL — LOW (ref 11.5–15.5)
KETONES UR-MCNC: ABNORMAL MG/DL
LACTATE SERPL-SCNC: 1.2 MMOL/L — SIGNIFICANT CHANGE UP (ref 0.4–2)
LEUKOCYTE ESTERASE UR-ACNC: NEGATIVE — SIGNIFICANT CHANGE UP
MCHC RBC-ENTMCNC: 29.7 PG — SIGNIFICANT CHANGE UP (ref 27–34)
MCHC RBC-ENTMCNC: 32 GM/DL — SIGNIFICANT CHANGE UP (ref 32–36)
MCV RBC AUTO: 92.9 FL — SIGNIFICANT CHANGE UP (ref 80–100)
NITRITE UR-MCNC: NEGATIVE — SIGNIFICANT CHANGE UP
NRBC # BLD: 0 /100 WBCS — SIGNIFICANT CHANGE UP (ref 0–0)
PH UR: 5.5 — SIGNIFICANT CHANGE UP (ref 5–8)
PLATELET # BLD AUTO: 292 K/UL — SIGNIFICANT CHANGE UP (ref 150–400)
PROT UR-MCNC: NEGATIVE MG/DL — SIGNIFICANT CHANGE UP
RBC # BLD: 3.4 M/UL — LOW (ref 3.8–5.2)
RBC # FLD: 18.1 % — HIGH (ref 10.3–14.5)
SP GR SPEC: 1.02 — SIGNIFICANT CHANGE UP (ref 1–1.03)
UROBILINOGEN FLD QL: 0.2 E.U./DL — SIGNIFICANT CHANGE UP
WBC # BLD: 15.12 K/UL — HIGH (ref 3.8–10.5)
WBC # FLD AUTO: 15.12 K/UL — HIGH (ref 3.8–10.5)

## 2023-05-12 PROCEDURE — 99222 1ST HOSP IP/OBS MODERATE 55: CPT | Mod: GC

## 2023-05-12 PROCEDURE — 73700 CT LOWER EXTREMITY W/O DYE: CPT | Mod: 26,LT

## 2023-05-12 PROCEDURE — 70450 CT HEAD/BRAIN W/O DYE: CPT | Mod: 26

## 2023-05-12 PROCEDURE — 93010 ELECTROCARDIOGRAM REPORT: CPT

## 2023-05-12 RX ORDER — FENTANYL CITRATE 50 UG/ML
25 INJECTION INTRAVENOUS ONCE
Refills: 0 | Status: DISCONTINUED | OUTPATIENT
Start: 2023-05-12 | End: 2023-05-12

## 2023-05-12 RX ORDER — MORPHINE SULFATE 50 MG/1
2 CAPSULE, EXTENDED RELEASE ORAL EVERY 6 HOURS
Refills: 0 | Status: DISCONTINUED | OUTPATIENT
Start: 2023-05-12 | End: 2023-05-12

## 2023-05-12 RX ORDER — INSULIN LISPRO 100/ML
VIAL (ML) SUBCUTANEOUS
Refills: 0 | Status: DISCONTINUED | OUTPATIENT
Start: 2023-05-12 | End: 2023-05-17

## 2023-05-12 RX ORDER — GLUCAGON INJECTION, SOLUTION 0.5 MG/.1ML
1 INJECTION, SOLUTION SUBCUTANEOUS ONCE
Refills: 0 | Status: DISCONTINUED | OUTPATIENT
Start: 2023-05-12 | End: 2023-05-17

## 2023-05-12 RX ORDER — SODIUM CHLORIDE 9 MG/ML
1000 INJECTION, SOLUTION INTRAVENOUS
Refills: 0 | Status: DISCONTINUED | OUTPATIENT
Start: 2023-05-12 | End: 2023-05-17

## 2023-05-12 RX ORDER — DEXTROSE 50 % IN WATER 50 %
25 SYRINGE (ML) INTRAVENOUS ONCE
Refills: 0 | Status: DISCONTINUED | OUTPATIENT
Start: 2023-05-12 | End: 2023-05-17

## 2023-05-12 RX ORDER — DEXTROSE 50 % IN WATER 50 %
12.5 SYRINGE (ML) INTRAVENOUS ONCE
Refills: 0 | Status: DISCONTINUED | OUTPATIENT
Start: 2023-05-12 | End: 2023-05-17

## 2023-05-12 RX ORDER — INSULIN LISPRO 100/ML
2 VIAL (ML) SUBCUTANEOUS ONCE
Refills: 0 | Status: COMPLETED | OUTPATIENT
Start: 2023-05-12 | End: 2023-05-12

## 2023-05-12 RX ORDER — ENOXAPARIN SODIUM 100 MG/ML
40 INJECTION SUBCUTANEOUS EVERY 24 HOURS
Refills: 0 | Status: DISCONTINUED | OUTPATIENT
Start: 2023-05-12 | End: 2023-05-17

## 2023-05-12 RX ORDER — DEXTROSE 50 % IN WATER 50 %
15 SYRINGE (ML) INTRAVENOUS ONCE
Refills: 0 | Status: DISCONTINUED | OUTPATIENT
Start: 2023-05-12 | End: 2023-05-17

## 2023-05-12 RX ORDER — ACETAMINOPHEN 500 MG
650 TABLET ORAL EVERY 6 HOURS
Refills: 0 | Status: DISCONTINUED | OUTPATIENT
Start: 2023-05-12 | End: 2023-05-17

## 2023-05-12 RX ORDER — DIAZEPAM 5 MG
1 TABLET ORAL ONCE
Refills: 0 | Status: DISCONTINUED | OUTPATIENT
Start: 2023-05-12 | End: 2023-05-12

## 2023-05-12 RX ORDER — KETOROLAC TROMETHAMINE 30 MG/ML
15 SYRINGE (ML) INJECTION EVERY 8 HOURS
Refills: 0 | Status: DISCONTINUED | OUTPATIENT
Start: 2023-05-12 | End: 2023-05-15

## 2023-05-12 RX ORDER — LIDOCAINE 4 G/100G
1 CREAM TOPICAL EVERY 24 HOURS
Refills: 0 | Status: DISCONTINUED | OUTPATIENT
Start: 2023-05-12 | End: 2023-05-17

## 2023-05-12 RX ORDER — OXYCODONE HYDROCHLORIDE 5 MG/1
5 TABLET ORAL EVERY 6 HOURS
Refills: 0 | Status: DISCONTINUED | OUTPATIENT
Start: 2023-05-12 | End: 2023-05-15

## 2023-05-12 RX ADMIN — Medication 1 MILLIGRAM(S): at 00:57

## 2023-05-12 RX ADMIN — Medication 650 MILLIGRAM(S): at 13:16

## 2023-05-12 RX ADMIN — FENTANYL CITRATE 25 MICROGRAM(S): 50 INJECTION INTRAVENOUS at 06:15

## 2023-05-12 RX ADMIN — OXYCODONE HYDROCHLORIDE 5 MILLIGRAM(S): 5 TABLET ORAL at 22:44

## 2023-05-12 RX ADMIN — Medication 2 UNIT(S): at 18:44

## 2023-05-12 RX ADMIN — OXYCODONE HYDROCHLORIDE 5 MILLIGRAM(S): 5 TABLET ORAL at 23:45

## 2023-05-12 RX ADMIN — FENTANYL CITRATE 25 MICROGRAM(S): 50 INJECTION INTRAVENOUS at 06:25

## 2023-05-12 RX ADMIN — ENOXAPARIN SODIUM 40 MILLIGRAM(S): 100 INJECTION SUBCUTANEOUS at 17:07

## 2023-05-12 RX ADMIN — FENTANYL CITRATE 25 MICROGRAM(S): 50 INJECTION INTRAVENOUS at 00:57

## 2023-05-12 RX ADMIN — Medication 2: at 12:32

## 2023-05-12 RX ADMIN — Medication 15 MILLIGRAM(S): at 17:45

## 2023-05-12 RX ADMIN — Medication 650 MILLIGRAM(S): at 12:32

## 2023-05-12 RX ADMIN — LIDOCAINE 1 PATCH: 4 CREAM TOPICAL at 18:02

## 2023-05-12 RX ADMIN — FENTANYL CITRATE 25 MICROGRAM(S): 50 INJECTION INTRAVENOUS at 09:25

## 2023-05-12 RX ADMIN — FENTANYL CITRATE 25 MICROGRAM(S): 50 INJECTION INTRAVENOUS at 08:34

## 2023-05-12 RX ADMIN — MORPHINE SULFATE 4 MILLIGRAM(S): 50 CAPSULE, EXTENDED RELEASE ORAL at 06:15

## 2023-05-12 RX ADMIN — Medication 15 MILLIGRAM(S): at 17:07

## 2023-05-12 RX ADMIN — LIDOCAINE 1 PATCH: 4 CREAM TOPICAL at 12:32

## 2023-05-12 NOTE — H&P ADULT - PROBLEM SELECTOR PLAN 2
- Leukocytosis on admission with neutrophilic predominance, likely reactive after fall and fracture   - UA without evidence of infection- no reported infectious symptoms per patient  - afebrile, no cough, no dysuria, CXR without consolidation or infiltrate  - continue to monitor/trend cbc

## 2023-05-12 NOTE — PATIENT PROFILE ADULT - FUNCTIONAL ASSESSMENT - BASIC MOBILITY 6.
1-calculated by average/Not able to assess (calculate score using [facility] averaging method)

## 2023-05-12 NOTE — ED PROVIDER NOTE - CLINICAL SUMMARY MEDICAL DECISION MAKING FREE TEXT BOX
Pt presents with left hip pain after mechanical fall.  Given IV Morphine & eventually Fentanyl as needed for pain.  Xrays with limited views show what I suspect is a periprosthetic intertrochanteric femur fracture.  Contacted Ortho (Dr. Stewart) who asked to have Xrays sent to him, but there was a problem uploading images on his end.  Dr. Stewart felt that it might not even be an acute fracture or one requiring surgical intervention, but pt unable to tolerate Xrays due to pain.  She had a CT of the hip (which was felt to be more definitive) which is pending. Pt presents with left hip pain after mechanical fall.  Given IV Morphine & eventually Fentanyl as needed for pain.  Xrays with limited views show what I suspect is a periprosthetic intertrochanteric femur fracture.  Contacted Ortho (Dr. Stewart) who asked to have Xrays sent to him, but there was a problem uploading images on his end.  Dr. Stewart felt that it might not even be an acute fracture or one requiring surgical intervention, but pt unable to tolerate Xrays due to pain.  She had a CT of the hip (which was felt to be more definitive) which is pending.    Addendum:  CT hip confirms periprosthetic left hip fracture.  Pt admitted to Internal Medicine at [facility] for pain management/PT & Ortho consultation.

## 2023-05-12 NOTE — H&P ADULT - NSHPPHYSICALEXAM_GEN_ALL_CORE
.  VITAL SIGNS:  T(C): 36.9 (05-12-23 @ 09:51), Max: 36.9 (05-12-23 @ 09:51)  T(F): 98.4 (05-12-23 @ 09:51), Max: 98.4 (05-12-23 @ 09:51)  HR: 104 (05-12-23 @ 09:51) (81 - 104)  BP: 182/83 (05-12-23 @ 09:51) (159/71 - 200/76)  BP(mean): --  RR: 20 (05-12-23 @ 09:51) (16 - 20)  SpO2: 100% (05-12-23 @ 09:51) (94% - 100%)  Wt(kg): --    PHYSICAL EXAM:    Constitutional: NAD   HEENT: NC/AT, PERRL, EOMI, clear conjunctiva, uvula midline, no oropharyngeal erythema or exudates; MMM  Neck: supple; no JVD   Respiratory: CTA B/L; no W/R/R, no retractions  Cardiac: +S1/S2; RRR; no M/R/G  Gastrointestinal: soft, NT/ND; no rebound or guarding; +BSx4  Back: no vertebral point tenderness, no CVAT B/L  Extremities: WWP, no clubbing or cyanosis; no peripheral edema  Musculoskeletal: NROM x4; no joint swelling, tenderness or erythema  Vascular: peripheral pulses present   Dermatologic: skin warm, dry and intact; no rashes, wounds, or scars  Neurologic: AAOx3; CNII-XII grossly intact; no focal deficits .  VITAL SIGNS:  T(C): 36.9 (05-12-23 @ 09:51), Max: 36.9 (05-12-23 @ 09:51)  T(F): 98.4 (05-12-23 @ 09:51), Max: 98.4 (05-12-23 @ 09:51)  HR: 104 (05-12-23 @ 09:51) (81 - 104)  BP: 182/83 (05-12-23 @ 09:51) (159/71 - 200/76)  BP(mean): --  RR: 20 (05-12-23 @ 09:51) (16 - 20)  SpO2: 100% (05-12-23 @ 09:51) (94% - 100%)  Wt(kg): --    PHYSICAL EXAM:    Constitutional: NAD   HEENT: NC/AT, PERRL, EOMI, clear conjunctiva, uvula midline, no oropharyngeal erythema or exudates; MMM  Neck: supple; no JVD   Respiratory: CTA B/L; no W/R/R, no retractions  Cardiac: +S1/S2; RRR; no M/R/G  Gastrointestinal: soft, NT/ND; no rebound or guarding; +BSx4  Back: no vertebral point tenderness, no CVAT B/L  Extremities: WWP, no clubbing or cyanosis; no peripheral edema  Musculoskeletal: ROM of left leg limited 2/2 pain,  no joint swelling, or erythema. left him TTP   Vascular: peripheral pulses present   Dermatologic: skin warm, dry and intact; no rashes, wounds, or scars  Neurologic: AAOx3; CNII-XII grossly intact; no focal deficits

## 2023-05-12 NOTE — H&P ADULT - PROBLEM SELECTOR PLAN 1
- patient sustained left periprosthetic fracture of the proximal left femur seen on CT scan after fall   - ortho consulted: no acute surgical intervention     Plan   - pain control: tylenol mild pain, toradol moderate (using sparingly), oxycodone severe pain   - PT/OT  - lidocaine patch   - ICP/OOB as tolerated

## 2023-05-12 NOTE — H&P ADULT - PROBLEM SELECTOR PLAN 4
- hx of HTN, however not on any medications   - believe elevated BP likely 2/2 pain and anxiety   - continue to monitor BP and pain control as above   - if consistently elevated and pain is controlled , can consider starting BP med

## 2023-05-12 NOTE — H&P ADULT - NSHPLABSRESULTS_GEN_ALL_CORE
.  LABS:                         10.1   15.12 )-----------( 292      ( 12 May 2023 00:30 )             31.6     05-    140  |  105  |  23  ----------------------------<  245<H>  3.9   |  26  |  0.99    Ca    9.2      11 May 2023 19:58    TPro  6.7  /  Alb  3.9  /  TBili  0.4  /  DBili  x   /  AST  19  /  ALT  18  /  AlkPhos  79  05-11    PT/INR - ( 11 May 2023 19:58 )   PT: 11.2 sec;   INR: 0.97          PTT - ( 11 May 2023 19:58 )  PTT:24.4 sec  Urinalysis Basic - ( 11 May 2023 19:58 )    Color: Yellow / Appearance: Clear / S.020 / pH: x  Gluc: x / Ketone: Trace mg/dL  / Bili: NEGATIVE / Urobili: 0.2 E.U./dL   Blood: x / Protein: NEGATIVE mg/dL / Nitrite: NEGATIVE   Leuk Esterase: NEGATIVE / RBC: x / WBC x   Sq Epi: x / Non Sq Epi: x / Bacteria: x                RADIOLOGY, EKG & ADDITIONAL TESTS: Reviewed.

## 2023-05-12 NOTE — ED PROVIDER NOTE - OBJECTIVE STATEMENT
She is somewhat of a poor historian. She is somewhat of a poor historian.  She states she had been walking her dog using a walker for assistance, and while returning to her building, she was struck by the entry door which knocked her off balance, causing her to fall onto her left side.  She struck her face on the ground but denies any head injury or LOC.  She complains mostly of left hip/leg pain and was unable to ambulate and is unable to straighten her left leg.  She has a history of bilateral THAs in the past without complications.  No hx of hip dislocations.  Denies any neck or back pain.  Denies any CP, SOB, or abdominal pain.  She is not on any anticoagulation.

## 2023-05-12 NOTE — ED PROVIDER NOTE - PROGRESS NOTE DETAILS
Pain improved.  Still awaiting CT report Dr. Stewart re-consulted after CT imaging report. Still believes hip to be stable and not requiring surgery at this time. Medicine team contacted and patient admitted for pain control and rehab.

## 2023-05-12 NOTE — CONSULT NOTE ADULT - SUBJECTIVE AND OBJECTIVE BOX
Orthopaedic Surgery Consult Note    For Surgeon: Dr. Stewart    HPI: Patient is a 88y old  Female who presents with a chief complaint of L hip pain s/p ground level fall sustained while  returning to her building from walking her dog.     Patient reports that upon returning ot her building she was struck by the entry door which knocked her off balance, causing her to fall onto her left side.  Endorses head strike but denies injury or LOC.      At baseline patient ambulates with walker for assistance but has been unable to ambulate since injury.    Pt today c/o persistent hip pain without any new weakness/n/t/p. Also reports chronic back pain, and history of previous falls without injury.  No other MSK complaints.      PMHx - DMII, HTN  PSx -THAx2  Social (-)Tobacco (-)EtOh (-)Elicit substance        MEDICATIONS  (STANDING):  dextrose 5%. 1000 milliLiter(s) (50 mL/Hr) IV Continuous <Continuous>  dextrose 5%. 1000 milliLiter(s) (100 mL/Hr) IV Continuous <Continuous>  dextrose 50% Injectable 12.5 Gram(s) IV Push once  dextrose 50% Injectable 25 Gram(s) IV Push once  dextrose 50% Injectable 25 Gram(s) IV Push once  glucagon  Injectable 1 milliGRAM(s) IntraMuscular once  insulin lispro (ADMELOG) corrective regimen sliding scale   SubCutaneous three times a day before meals  lidocaine   4% Patch 1 Patch Transdermal every 24 hours    MEDICATIONS  (PRN):  acetaminophen     Tablet .. 650 milliGRAM(s) Oral every 6 hours PRN Temp greater or equal to 38C (100.4F), Mild Pain (1 - 3)  dextrose Oral Gel 15 Gram(s) Oral once PRN Blood Glucose LESS THAN 70 milliGRAM(s)/deciliter      Vital Signs Last 24 Hrs  T(C): 36.9 (12 May 2023 09:51), Max: 36.9 (12 May 2023 09:51)  T(F): 98.4 (12 May 2023 09:51), Max: 98.4 (12 May 2023 09:51)  HR: 104 (12 May 2023 09:51) (81 - 104)  BP: 182/83 (12 May 2023 09:51) (159/71 - 200/76)  BP(mean): --  RR: 20 (12 May 2023 09:51) (16 - 20)  SpO2: 100% (12 May 2023 09:51) (94% - 100%)    Parameters below as of 12 May 2023 09:51  Patient On (Oxygen Delivery Method): room air      Physical Exam:  AVSS  Gen: NAD, AOx3    LLE  Skin intact no open injuries; TTP to right hip/groin  Unable to SLR or flex/ext at knee 2/2 pain, PF/DF intact, toes mobile  EHL/TA/GS intact to motor 5/5  SILT Saph/sofie/sp/dp/tib  Palpable DP & PT  Brisk cap refill distally    Labs                    10.1   15.12 )-----------( 292      ( 12 May 2023 00:30 )             31.6     05-11    140  |  105  |  23  ----------------------------<  245<H>  3.9   |  26  |  0.99    Ca    9.2      11 May 2023 19:58    TPro  6.7  /  Alb  3.9  /  TBili  0.4  /  DBili  x   /  AST  19  /  ALT  18  /  AlkPhos  79  05-11      Imaging: Status post left hip hemiarthroplasty with a minimally displaced periprosthetic fracture of the proximal left femur.    A/P: 88yFemale with [place] B  periprosthetic hip fracture.    -No acute orthopedic surgical  intervention at this time  -Recommend PT/OT while  -Protected weight bearing LLE  -Pain rx per primary  -DVT ppx per primary    Please page with any questions or concerns  -Discussed with Dr. Lechuga Pager [phone]   Orthopaedic Surgery Consult Note    For Surgeon: Dr. Stewart    HPI:   Patient is a 88y old  Female who presents with a chief complaint of L hip pain s/p ground level fall sustained while  returning to her building from walking her dog.     Patient reports that upon returning ot her building she was struck by the entry door which knocked her off balance, causing her to fall onto her left side.  Endorses head strike but denies injury or LOC.      At baseline patient ambulates with walker for assistance but has been unable to ambulate since injury.    Pt today c/o persistent hip pain without any new weakness/n/t/p. Also reports chronic back pain, and history of previous falls without injury.  No other MSK complaints.      PMHx - DMII, HTN  PSx -THAx2  Social (-)Tobacco (-)EtOh (-)Elicit substance        MEDICATIONS  (STANDING):  dextrose 5%. 1000 milliLiter(s) (50 mL/Hr) IV Continuous <Continuous>  dextrose 5%. 1000 milliLiter(s) (100 mL/Hr) IV Continuous <Continuous>  dextrose 50% Injectable 12.5 Gram(s) IV Push once  dextrose 50% Injectable 25 Gram(s) IV Push once  dextrose 50% Injectable 25 Gram(s) IV Push once  glucagon  Injectable 1 milliGRAM(s) IntraMuscular once  insulin lispro (ADMELOG) corrective regimen sliding scale   SubCutaneous three times a day before meals  lidocaine   4% Patch 1 Patch Transdermal every 24 hours    MEDICATIONS  (PRN):  acetaminophen     Tablet .. 650 milliGRAM(s) Oral every 6 hours PRN Temp greater or equal to 38C (100.4F), Mild Pain (1 - 3)  dextrose Oral Gel 15 Gram(s) Oral once PRN Blood Glucose LESS THAN 70 milliGRAM(s)/deciliter      Vital Signs Last 24 Hrs  T(C): 36.9 (12 May 2023 09:51), Max: 36.9 (12 May 2023 09:51)  T(F): 98.4 (12 May 2023 09:51), Max: 98.4 (12 May 2023 09:51)  HR: 104 (12 May 2023 09:51) (81 - 104)  BP: 182/83 (12 May 2023 09:51) (159/71 - 200/76)  BP(mean): --  RR: 20 (12 May 2023 09:51) (16 - 20)  SpO2: 100% (12 May 2023 09:51) (94% - 100%)    Parameters below as of 12 May 2023 09:51  Patient On (Oxygen Delivery Method): room air      Physical Exam:  AVSS  Gen: NAD, AOx3    LLE  Skin intact no open injuries; TTP to right hip/groin  Unable to SLR or flex/ext at knee 2/2 pain, PF/DF intact, toes mobile  EHL/TA/GS intact to motor 5/5  SILT Saph/sofie/sp/dp/tib  Palpable DP & PT  Brisk cap refill distally    Labs                    10.1   15.12 )-----------( 292      ( 12 May 2023 00:30 )             31.6     05-11    140  |  105  |  23  ----------------------------<  245<H>  3.9   |  26  |  0.99    Ca    9.2      11 May 2023 19:58    TPro  6.7  /  Alb  3.9  /  TBili  0.4  /  DBili  x   /  AST  19  /  ALT  18  /  AlkPhos  79  05-11      Imaging: Status post left hip hemiarthroplasty with a minimally displaced periprosthetic fracture of the proximal left femur.      A/P: 88yFemale with [place] B  periprosthetic hip fracture.    -No acute orthopedic surgical intervention at this time  -Recommend PT/OT   -Toe touch weight bearing LLE  -Pain rx per primary  -DVT ppx per primary    Please page with any questions or concerns  Discussed with Dr. Stewart    Ortho Pager [phone]   Orthopaedic Surgery Consult Note    For Surgeon: Dr. Stewart    HPI:   Patient is a 88y old  Female who presents with a chief complaint of L hip pain s/p ground level fall sustained while  returning to her building from walking her dog.     Patient reports that upon returning ot her building she was struck by the entry door which knocked her off balance, causing her to fall onto her left side.  Endorses head strike but denies injury or LOC.      At baseline patient ambulates with walker for assistance but has been unable to ambulate since injury.    Pt today c/o persistent hip pain without any new weakness/n/t/p. Also reports chronic back pain, and history of previous falls without injury.  No other MSK complaints.      PMHx - DMII, HTN  PSx -THAx2  Social (-)Tobacco (-)EtOh (-)Elicit substance        MEDICATIONS  (STANDING):  dextrose 5%. 1000 milliLiter(s) (50 mL/Hr) IV Continuous <Continuous>  dextrose 5%. 1000 milliLiter(s) (100 mL/Hr) IV Continuous <Continuous>  dextrose 50% Injectable 12.5 Gram(s) IV Push once  dextrose 50% Injectable 25 Gram(s) IV Push once  dextrose 50% Injectable 25 Gram(s) IV Push once  glucagon  Injectable 1 milliGRAM(s) IntraMuscular once  insulin lispro (ADMELOG) corrective regimen sliding scale   SubCutaneous three times a day before meals  lidocaine   4% Patch 1 Patch Transdermal every 24 hours    MEDICATIONS  (PRN):  acetaminophen     Tablet .. 650 milliGRAM(s) Oral every 6 hours PRN Temp greater or equal to 38C (100.4F), Mild Pain (1 - 3)  dextrose Oral Gel 15 Gram(s) Oral once PRN Blood Glucose LESS THAN 70 milliGRAM(s)/deciliter      Vital Signs Last 24 Hrs  T(C): 36.9 (12 May 2023 09:51), Max: 36.9 (12 May 2023 09:51)  T(F): 98.4 (12 May 2023 09:51), Max: 98.4 (12 May 2023 09:51)  HR: 104 (12 May 2023 09:51) (81 - 104)  BP: 182/83 (12 May 2023 09:51) (159/71 - 200/76)  BP(mean): --  RR: 20 (12 May 2023 09:51) (16 - 20)  SpO2: 100% (12 May 2023 09:51) (94% - 100%)    Parameters below as of 12 May 2023 09:51  Patient On (Oxygen Delivery Method): room air      Physical Exam:  AVSS  Gen: NAD, AOx3    LLE  Skin intact no open injuries; TTP to right hip/groin  Unable to SLR or flex/ext at knee 2/2 pain, PF/DF intact, toes mobile  EHL/TA/GS intact to motor 5/5  SILT Saph/sofie/sp/dp/tib  Palpable DP & PT  Brisk cap refill distally    Labs                    10.1   15.12 )-----------( 292      ( 12 May 2023 00:30 )             31.6     05-11    140  |  105  |  23  ----------------------------<  245<H>  3.9   |  26  |  0.99    Ca    9.2      11 May 2023 19:58    TPro  6.7  /  Alb  3.9  /  TBili  0.4  /  DBili  x   /  AST  19  /  ALT  18  /  AlkPhos  79  05-11      Imaging: Status post left hip hemiarthroplasty with a minimally displaced periprosthetic fracture of the proximal left femur.      A/P: 88yFemale with [place] B  periprosthetic hip fracture.    -No acute orthopedic surgical intervention at this time  -Recommend PT/OT   -Toe touch weight bearing LLE 4-6 weeks  -Pain rx per primary  -DVT ppx per primary  -Follow up with Dr. Stewart outpatient    Please page with any questions or concerns  Discussed with Dr. Stewart    Ortho Pager [phone]

## 2023-05-12 NOTE — ED PROVIDER NOTE - MUSCULOSKELETAL, MLM
Spine appears normal  Left hip extremely tender over greater trochanteric region.  Hip held in flexion.  No obvious malrotation.  DNV intact Bilateral LE.

## 2023-05-12 NOTE — ED ADULT NURSE REASSESSMENT NOTE - NS ED NURSE REASSESS COMMENT FT1
Pt resting, denies any medical complaints or pain at this time.
patient remediated for pain as ordered. patient is more comfortable now, resting. VSS

## 2023-05-12 NOTE — H&P ADULT - PROBLEM SELECTOR PLAN 3
- on metformin at home, continue with moderate sliding scale  - monitor FSG, goal 150-180, adjust insulin regimen as needed

## 2023-05-12 NOTE — H&P ADULT - HISTORY OF PRESENT ILLNESS
87 y/o M F with PMH of DM, HTN, lumbar compression fracture,  87 y/o M F with PMH of DM, HTN, lumbar compression fracture, and b/l hip replacements presenting with left hip pain after a fall. Patient states she was going into the lobby of her building and the door was heavy and it swung back and hit her which caused her to fall backwards on her left side. She since then has had severe left hip pain. She denies LOC, head trauma, or chest pain/palpitations at the time of the event. She has hx of mechanical falls, she has previous hip fracture with prosthesis in place. She also has hx of L2 compression fracture hx along with osteonecrosis and was due to have a crotisone in 89 y/o M F with PMH of DM, HTN, lumbar compression fracture, and b/l hip replacements presenting with left hip pain after a fall. Patient states she was going into the lobby of her building and the door was heavy and it swung back and hit her which caused her to fall backwards on her left side. She since then has had severe left hip pain. She denies LOC, head trauma, or chest pain/palpitations at the time of the event. She has hx of mechanical falls, she has previous hip fracture with prosthesis in place. She also has hx of L2 compression fracture hx along with osteonecrosis and was due to have a cortisone injection  87 y/o M F with PMH of DM, HTN, lumbar compression fracture, and b/l hip replacements presenting with left hip pain after a fall. Patient states she was going into the lobby of her building and the door was heavy and it swung back and hit her which caused her to fall backwards on her left side. She since then has had severe left hip pain. She denies LOC, head trauma, or chest pain/palpitations at the time of the event. She has hx of mechanical falls, she has previous hip fracture with prosthesis in place. She also has hx of L2 compression fracture hx along with osteonecrosis and was due to have a cortisone injection outpatient but did not make it to her appointment.  89 y/o M F with PMH of DM, HTN, lumbar compression fracture, and b/l hip replacements presenting with left hip pain after a fall. Patient states she was going into the lobby of her building and the door was heavy and it swung back and hit her which caused her to fall backwards on her left side. She since then has had severe left hip pain. She denies LOC, head trauma, or chest pain/palpitations at the time of the event. She has hx of mechanical falls, she has previous hip fracture with prosthesis in place. She also has hx of L2 compression fracture hx along with osteonecrosis and was due to have a cortisone injection outpatient but did not make it to her appointment.     At this time she denies HA, dizziness, neck pain, chest pain, dyspnea, abd pain, N/V/D, Reports left hip pain, no pain on right hip or other joint pain.     Patient admitted for left periprosthetic fracture of the proximal left femur.

## 2023-05-12 NOTE — ED PROVIDER NOTE - SHIFT CHANGE DETAILS
Suspect left IT fx involving prosthesis.  CT hip pending to confirm.  Admit to Ortho if CT positive; possibly admit to Medicine for pain mgmt/PT/Ortho consult if CT neg.

## 2023-05-12 NOTE — PATIENT PROFILE ADULT - FALL HARM RISK - HARM RISK INTERVENTIONS

## 2023-05-12 NOTE — H&P ADULT - ASSESSMENT
87 y/o M F with PMH of DM, HTN, lumbar compression fracture, and b/l hip replacements presenting with left hip pain after a fall, found to have left periprosthetic fracture of the proximal left femur.

## 2023-05-13 ENCOUNTER — TRANSCRIPTION ENCOUNTER (OUTPATIENT)
Age: 88
End: 2023-05-13

## 2023-05-13 LAB
A1C WITH ESTIMATED AVERAGE GLUCOSE RESULT: 6.7 % — HIGH (ref 4–5.6)
ANION GAP SERPL CALC-SCNC: 8 MMOL/L — SIGNIFICANT CHANGE UP (ref 5–17)
BASOPHILS # BLD AUTO: 0.06 K/UL — SIGNIFICANT CHANGE UP (ref 0–0.2)
BASOPHILS NFR BLD AUTO: 0.6 % — SIGNIFICANT CHANGE UP (ref 0–2)
BUN SERPL-MCNC: 18 MG/DL — SIGNIFICANT CHANGE UP (ref 7–23)
CALCIUM SERPL-MCNC: 9.2 MG/DL — SIGNIFICANT CHANGE UP (ref 8.4–10.5)
CHLORIDE SERPL-SCNC: 106 MMOL/L — SIGNIFICANT CHANGE UP (ref 96–108)
CO2 SERPL-SCNC: 26 MMOL/L — SIGNIFICANT CHANGE UP (ref 22–31)
CREAT SERPL-MCNC: 0.82 MG/DL — SIGNIFICANT CHANGE UP (ref 0.5–1.3)
EGFR: 70 ML/MIN/1.73M2 — SIGNIFICANT CHANGE UP
EOSINOPHIL # BLD AUTO: 0.28 K/UL — SIGNIFICANT CHANGE UP (ref 0–0.5)
EOSINOPHIL NFR BLD AUTO: 2.9 % — SIGNIFICANT CHANGE UP (ref 0–6)
ESTIMATED AVERAGE GLUCOSE: 146 MG/DL — HIGH (ref 68–114)
GLUCOSE BLDC GLUCOMTR-MCNC: 120 MG/DL — HIGH (ref 70–99)
GLUCOSE BLDC GLUCOMTR-MCNC: 129 MG/DL — HIGH (ref 70–99)
GLUCOSE BLDC GLUCOMTR-MCNC: 154 MG/DL — HIGH (ref 70–99)
GLUCOSE BLDC GLUCOMTR-MCNC: 155 MG/DL — HIGH (ref 70–99)
GLUCOSE SERPL-MCNC: 122 MG/DL — HIGH (ref 70–99)
HCT VFR BLD CALC: 28.5 % — LOW (ref 34.5–45)
HGB BLD-MCNC: 9 G/DL — LOW (ref 11.5–15.5)
IMM GRANULOCYTES NFR BLD AUTO: 0.4 % — SIGNIFICANT CHANGE UP (ref 0–0.9)
LYMPHOCYTES # BLD AUTO: 2.04 K/UL — SIGNIFICANT CHANGE UP (ref 1–3.3)
LYMPHOCYTES # BLD AUTO: 21.4 % — SIGNIFICANT CHANGE UP (ref 13–44)
MAGNESIUM SERPL-MCNC: 1.7 MG/DL — SIGNIFICANT CHANGE UP (ref 1.6–2.6)
MCHC RBC-ENTMCNC: 29.1 PG — SIGNIFICANT CHANGE UP (ref 27–34)
MCHC RBC-ENTMCNC: 31.6 GM/DL — LOW (ref 32–36)
MCV RBC AUTO: 92.2 FL — SIGNIFICANT CHANGE UP (ref 80–100)
MONOCYTES # BLD AUTO: 0.6 K/UL — SIGNIFICANT CHANGE UP (ref 0–0.9)
MONOCYTES NFR BLD AUTO: 6.3 % — SIGNIFICANT CHANGE UP (ref 2–14)
NEUTROPHILS # BLD AUTO: 6.52 K/UL — SIGNIFICANT CHANGE UP (ref 1.8–7.4)
NEUTROPHILS NFR BLD AUTO: 68.4 % — SIGNIFICANT CHANGE UP (ref 43–77)
NRBC # BLD: 0 /100 WBCS — SIGNIFICANT CHANGE UP (ref 0–0)
PHOSPHATE SERPL-MCNC: 3.5 MG/DL — SIGNIFICANT CHANGE UP (ref 2.5–4.5)
PLATELET # BLD AUTO: 242 K/UL — SIGNIFICANT CHANGE UP (ref 150–400)
POTASSIUM SERPL-MCNC: 4 MMOL/L — SIGNIFICANT CHANGE UP (ref 3.5–5.3)
POTASSIUM SERPL-SCNC: 4 MMOL/L — SIGNIFICANT CHANGE UP (ref 3.5–5.3)
RBC # BLD: 3.09 M/UL — LOW (ref 3.8–5.2)
RBC # FLD: 18.2 % — HIGH (ref 10.3–14.5)
SODIUM SERPL-SCNC: 140 MMOL/L — SIGNIFICANT CHANGE UP (ref 135–145)
WBC # BLD: 9.54 K/UL — SIGNIFICANT CHANGE UP (ref 3.8–10.5)
WBC # FLD AUTO: 9.54 K/UL — SIGNIFICANT CHANGE UP (ref 3.8–10.5)

## 2023-05-13 PROCEDURE — 99232 SBSQ HOSP IP/OBS MODERATE 35: CPT | Mod: GC

## 2023-05-13 RX ORDER — PANTOPRAZOLE SODIUM 20 MG/1
40 TABLET, DELAYED RELEASE ORAL
Refills: 0 | Status: DISCONTINUED | OUTPATIENT
Start: 2023-05-13 | End: 2023-05-17

## 2023-05-13 RX ORDER — GABAPENTIN 400 MG/1
100 CAPSULE ORAL THREE TIMES A DAY
Refills: 0 | Status: DISCONTINUED | OUTPATIENT
Start: 2023-05-13 | End: 2023-05-17

## 2023-05-13 RX ORDER — METFORMIN HYDROCHLORIDE 850 MG/1
1 TABLET ORAL
Refills: 0 | DISCHARGE

## 2023-05-13 RX ORDER — CYCLOBENZAPRINE HYDROCHLORIDE 10 MG/1
5 TABLET, FILM COATED ORAL THREE TIMES A DAY
Refills: 0 | Status: DISCONTINUED | OUTPATIENT
Start: 2023-05-13 | End: 2023-05-17

## 2023-05-13 RX ADMIN — Medication 15 MILLIGRAM(S): at 12:30

## 2023-05-13 RX ADMIN — Medication 15 MILLIGRAM(S): at 23:02

## 2023-05-13 RX ADMIN — ENOXAPARIN SODIUM 40 MILLIGRAM(S): 100 INJECTION SUBCUTANEOUS at 16:22

## 2023-05-13 RX ADMIN — Medication 2: at 17:45

## 2023-05-13 RX ADMIN — Medication 15 MILLIGRAM(S): at 12:01

## 2023-05-13 RX ADMIN — CYCLOBENZAPRINE HYDROCHLORIDE 5 MILLIGRAM(S): 10 TABLET, FILM COATED ORAL at 22:20

## 2023-05-13 RX ADMIN — CYCLOBENZAPRINE HYDROCHLORIDE 5 MILLIGRAM(S): 10 TABLET, FILM COATED ORAL at 13:49

## 2023-05-13 RX ADMIN — GABAPENTIN 100 MILLIGRAM(S): 400 CAPSULE ORAL at 11:53

## 2023-05-13 RX ADMIN — LIDOCAINE 1 PATCH: 4 CREAM TOPICAL at 11:53

## 2023-05-13 RX ADMIN — OXYCODONE HYDROCHLORIDE 5 MILLIGRAM(S): 5 TABLET ORAL at 17:06

## 2023-05-13 RX ADMIN — OXYCODONE HYDROCHLORIDE 5 MILLIGRAM(S): 5 TABLET ORAL at 08:25

## 2023-05-13 RX ADMIN — OXYCODONE HYDROCHLORIDE 5 MILLIGRAM(S): 5 TABLET ORAL at 09:20

## 2023-05-13 RX ADMIN — Medication 15 MILLIGRAM(S): at 22:20

## 2023-05-13 RX ADMIN — LIDOCAINE 1 PATCH: 4 CREAM TOPICAL at 00:24

## 2023-05-13 RX ADMIN — OXYCODONE HYDROCHLORIDE 5 MILLIGRAM(S): 5 TABLET ORAL at 16:22

## 2023-05-13 RX ADMIN — GABAPENTIN 100 MILLIGRAM(S): 400 CAPSULE ORAL at 22:21

## 2023-05-13 RX ADMIN — LIDOCAINE 1 PATCH: 4 CREAM TOPICAL at 17:05

## 2023-05-13 NOTE — DISCHARGE NOTE PROVIDER - NSDCFUSCHEDAPPT_GEN_ALL_CORE_FT
Pablito Garrett  [facility]  INT[address]  Scheduled Appointment: 07/26/2023     [facility]  ORTHOSURG  [address]  Scheduled Appointment: 05/25/2023    Pablito Garrett  [facility]  INT[address]  Scheduled Appointment: 07/26/2023

## 2023-05-13 NOTE — PROGRESS NOTE ADULT - SUBJECTIVE AND OBJECTIVE BOX
CRYSTAL MONAE 88y Female  MRN#: [id number]   CODE STATUS: FULL      SUBJECTIVE  Patient is a 88y old Female who presents with a chief complaint of FALL     (13 May 2023 09:58)  Currently admitted to medicine with the primary diagnosis of Closed left hip fracture    Today is hospital day 1d, and this morning she is in bed reporting severe pain.   Present Today:           Horne Catheter (x)No/ ()Yes? Indication:          Central Line (x)No/ ()Yes? Indication:          IV Fluids (x)No/ ()Yes? Type:  Rate:  Indication:      OBJECTIVE  PAST MEDICAL & SURGICAL HISTORY  Diabetes    Hypertension    History of bilateral hip arthroplasty      Home Meds:  acetaminophen 325 mg oral tablet: 3 tab(s) orally every 8 hours  aspirin 81 mg oral delayed release tablet: 1 tab(s) orally 2 times a day  Barri-Care topical ointment: Apply topically to affected area 1 to 3 times a day     Disp: 2 standard tubes of barrier cream  bisacodyl 10 mg rectal suppository: 1 suppository(ies) rectal once a day, As needed, If no bowel movement  coloplast barrier cream: Apply topically to affected area 1 to 3 times a day     Disp: 2 standard tubes  Macrobid 100 mg oral capsule: 1 cap(s) orally 2 times a day   methocarbamol 750 mg oral tablet: 1 tab(s) orally 4 times a day, As Needed   Mobic 7.5 mg oral tablet: 1 tab(s) orally once a day with food  oxyCODONE 5 mg oral tablet: 1 tab(s) orally every 4 hours, As needed, Severe Pain (7 - 10)  Pyridium 200 mg oral tablet: 1 tab(s) orally 2 times a day   Pyridium 200 mg oral tablet: 1 tab(s) orally 3 times a day (after meals)   traMADol 50 mg oral tablet: 1 tab(s) orally every 4 hours, As needed, Moderate Pain (4 - 6)  Tylenol 8 HR Arthritis Pain 650 mg oral tablet, extended release: 1 tab(s) orally every 8 hours, As Needed     ALLERGIES:  No Known Allergies    MEDICATIONS:  STANDING MEDICATIONS  dextrose 5%. 1000 milliLiter(s) IV Continuous <Continuous>  dextrose 5%. 1000 milliLiter(s) IV Continuous <Continuous>  dextrose 50% Injectable 25 Gram(s) IV Push once  dextrose 50% Injectable 12.5 Gram(s) IV Push once  dextrose 50% Injectable 25 Gram(s) IV Push once  enoxaparin Injectable 40 milliGRAM(s) SubCutaneous every 24 hours  glucagon  Injectable 1 milliGRAM(s) IntraMuscular once  insulin lispro (ADMELOG) corrective regimen sliding scale   SubCutaneous three times a day before meals  lidocaine   4% Patch 1 Patch Transdermal every 24 hours    PRN MEDICATIONS  acetaminophen     Tablet .. 650 milliGRAM(s) Oral every 6 hours PRN  dextrose Oral Gel 15 Gram(s) Oral once PRN  ketorolac   Injectable 15 milliGRAM(s) IV Push every 8 hours PRN  oxyCODONE    IR 5 milliGRAM(s) Oral every 6 hours PRN      VITAL SIGNS: Last 24 Hours  T(C): 36.9 (13 May 2023 05:34), Max: 36.9 (13 May 2023 05:34)  T(F): 98.4 (13 May 2023 05:34), Max: 98.4 (13 May 2023 05:34)  HR: 75 (13 May 2023 05:34) (67 - 75)  BP: 167/75 (13 May 2023 05:34) (136/57 - 167/75)  BP(mean): --  RR: 18 (13 May 2023 05:34) (18 - 18)  SpO2: 99% (13 May 2023 05:34) (99% - 100%)    LABS:                        9.0    9.54  )-----------( 242      ( 13 May 2023 05:30 )             28.5     05-13    140  |  106  |  18  ----------------------------<  122<H>  4.0   |  26  |  0.82    Ca    9.2      13 May 2023 05:30  Phos  3.5     05-13  Mg     1.7     05-13    TPro  6.7  /  Alb  3.9  /  TBili  0.4  /  DBili  x   /  AST  19  /  ALT  18  /  AlkPhos  79  05-11    PT/INR - ( 11 May 2023 19:58 )   PT: 11.2 sec;   INR: 0.97          PTT - ( 11 May 2023 19:58 )  PTT:24.4 sec  Urinalysis Basic - ( 11 May 2023 19:58 )    Color: Yellow / Appearance: Clear / S.020 / pH: x  Gluc: x / Ketone: Trace mg/dL  / Bili: NEGATIVE / Urobili: 0.2 E.U./dL   Blood: x / Protein: NEGATIVE mg/dL / Nitrite: NEGATIVE   Leuk Esterase: NEGATIVE / RBC: x / WBC x   Sq Epi: x / Non Sq Epi: x / Bacteria: x                RADIOLOGY:      PHYSICAL EXAM:  General: Moderately distressed, AAOx3  HEENT: atraumatic, normocephalic  Pulmonary: clear to auscultation bilaterally; No wheeze  Cardiovascular: Regular rate and rhythm; no murmurs, rubs or gallops. Normal S1S2  Gastrointestinal: Soft, nontender, nondistended; bowel sounds present  Musculoskeletal: 2+ peripheral pulses, no clubbing, cyanosis or edema ecchymosis on left hip.   Neurology: Pt. alert and oriented, fluent speech, limited movement of left leg 2/2 fracture.   Skin: no rashes or lesions

## 2023-05-13 NOTE — DIETITIAN INITIAL EVALUATION ADULT - OTHER INFO
87 y/o M F with PMH of DM, HTN, lumbar compression fracture, and b/l hip replacements presenting with left hip pain after a fall. Patient states she was going into the lobby of her building and the door was heavy and it swung back and hit her which caused her to fall backwards on her left side. She since then has had severe left hip pain. She denies LOC, head trauma, or chest pain/palpitations at the time of the event. She has hx of mechanical falls, she has previous hip fracture with prosthesis in place. She also has hx of L2 compression fracture hx along with osteonecrosis and was due to have a cortisone injection outpatient but did not make it to her appointment.     Patient seen at bedside for [facility] nutrition consult. Current diet order: Consistent carbohydrates, Ensure Enlive TID. NKFA. No difficulty chewing/swallowing reported. Reports fair appetite, pt hadn't consumed any food yet. PTA, pt reports decreased appetite/PO intake. Dosing weight: 110.4 pounds, BMI 21.6, reports UBW of 160 pounds years ago and reports weight loss in the past year but is unable to quantify. No pressure injuries documented. Noted with +1 L hip edema. Denies N/V/C, reports diarrhea. BS high, HgbA1c 6.7%-insulin regimen as ordered. Observed with severe muscle wasting to temples. Based on ASPEN guidelines, pt meets criteria for severe malnutrition. No cultural, ethnic, [employer] food preferences noted. See nutrition recommendations below.

## 2023-05-13 NOTE — DISCHARGE NOTE PROVIDER - NSDCCPCAREPLAN_GEN_ALL_CORE_FT
PRINCIPAL DISCHARGE DIAGNOSIS  Diagnosis: Fracture, proximal femur  Assessment and Plan of Treatment: Patient sustained left periprosthetic fracture of the proximal left femur seen on CT scan after fall.  Ortho consulted, do not recommend acute surgical intervention.  Patient was evaluated by PT, recommend EMELY.    - Toe touch weight bearing LLE 4-6 weeks  - Follow up with Dr. Stewart outpatient  - Continue pain control: Tylenol 650mg q6hrs for mild pain, Toradol 15mg q6hrs PRN for moderate (using sparingly), oxycodone IR 5 mg q6hrs for severe pain   - Continue gabapenin 100mg TID, flexeril 5mg TID, lidocaine patch.   - Continue PT/OT daily   - ICP/OOB as tolerated.      SECONDARY DISCHARGE DIAGNOSES  Diagnosis: Type 2 diabetes mellitus  Assessment and Plan of Treatment: Patient with history of diabetes, home medications metformin.  Continue home medications.    Diagnosis: HTN (hypertension)  Assessment and Plan of Treatment: Patient with history of hypertension, however not on any medications at home.  BP was likely initially elevated in setting of pain but removed over the last day.  Will continue to monitor off medications.     PRINCIPAL DISCHARGE DIAGNOSIS  Diagnosis: Fracture, proximal femur  Assessment and Plan of Treatment: Patient sustained left periprosthetic fracture of the proximal left femur seen on CT scan after fall.  Ortho consulted, do not recommend acute surgical intervention.  Patient was evaluated by PT, recommend EMELY.    - Toe touch weight bearing LLE 4-6 weeks  - Follow up with Dr. Stewart outpatient  - Continue pain control: Tylenol 650mg po q6hrs for mild pain, Toradol 10mg po q8hrs PRN for moderate (using sparingly), oxycodone IR 5 mg q6hrs for severe pain   - Continue Gabapenin 100mg TID, flexeril 5mg TID, lidocaine patch.   - Continue PT/OT daily   - ICP/OOB as tolerated.      SECONDARY DISCHARGE DIAGNOSES  Diagnosis: Type 2 diabetes mellitus  Assessment and Plan of Treatment: Patient with history of diabetes, home medications metformin.  Continue home medications.    Diagnosis: HTN (hypertension)  Assessment and Plan of Treatment: Patient with history of hypertension, however not on any medications at home.  BP was likely initially elevated in setting of pain but removed over the last day.  Will continue to monitor off medications.     PRINCIPAL DISCHARGE DIAGNOSIS  Diagnosis: Fracture, proximal femur  Assessment and Plan of Treatment: Patient sustained left periprosthetic fracture of the proximal left femur seen on CT scan after fall.  Ortho consulted, do not recommend acute surgical intervention.  Patient was evaluated by PT, recommend EMELY.    - Toe touch weight bearing LLE 4-6 weeks  - Follow up with Dr. Stewart outpatient  - Continue pain control: Tylenol 650mg po q6hrs for mild pain, Toradol 10mg po q8hrs PRN for moderate (using sparingly), oxycodone IR 5 mg q6hrs for severe pain   - Hold home Aspirin 81mg while patient in getting Toradol for pain.  Once pain improved and does not require NSAIDs please restart Aspirin 81mg   - Continue Gabapenin 100mg TID, flexeril 5mg TID, lidocaine patch.   - Continue PT/OT daily   - ICP/OOB as tolerated.      SECONDARY DISCHARGE DIAGNOSES  Diagnosis: Type 2 diabetes mellitus  Assessment and Plan of Treatment: Patient with history of diabetes, home medications metformin.  Continue home medications.    Diagnosis: HTN (hypertension)  Assessment and Plan of Treatment: Patient with history of hypertension, however not on any medications at home.  BP was likely initially elevated in setting of pain but removed over the last day.  Will continue to monitor off medications.

## 2023-05-13 NOTE — DIETITIAN INITIAL EVALUATION ADULT - ADD RECOMMEND
1. Continue with diet order-add Glucerna 2x/day (220 kcal,10 g protein per serving)   2. Encourage pt to meed nutritional needs as able   3. Monitor labs: electrolytes, cmp, fingesticks  4. Monitor weights   5. Pain and bowel regimen per team   6. Will continue to assess/honor food preferences as able  *discussed with team

## 2023-05-13 NOTE — DISCHARGE NOTE PROVIDER - HOSPITAL COURSE
87 y/o M F with PMH of DM, HTN, lumbar compression fracture, and b/l hip replacements presenting with left hip pain after a fall, found to have left periprosthetic fracture of the proximal left femur.     # Fracture, proximal femur  Patient sustained left periprosthetic fracture of the proximal left femur seen on CT scan after fall.  Ortho consulted, do not recommend acute surgical intervention.  Patient was evaluated by PT, recommend EMELY.    - Toe touch weight bearing LLE 4-6 weeks  - Follow up with Dr. Stewart outpatient  - Continue pain control: Tylenol 650mg q6hrs for mild pain, Toradol 15mg q6hrs PRN for moderate (using sparingly), oxycodone IR 5 mg q6hrs for severe pain   - Continue gabapenin 100mg TID, flexeril 5mg TID, lidocaine patch.   - Continue PT/OT daily   - ICP/OOB as tolerated.    # Leukocytosis  RESOLVED- Leukocytosis on admission with neutrophilic predominance, likely reactive after fall and fracture.  UA without evidence of infection- no reported infectious symptoms per patient.  Patient remained afebrile, no cough, no dysuria, CXR without consolidation or infiltrate.      # Type 2 diabetes mellitus  Patient with history of diabetes, home medications metformin.  Continue home medications.     # HTN (hypertension)  Patient with history of hypertension, however not on any medications at home.  BP was likely initially elevated in setting of pain but removed over the last day.  Will continue to monitor off medications.       Patient was discharged to: [facility]  New medications:  Tylenol 650mg q6hrs for mild pain, Toradol 15mg q6hrs PRN for moderate (using sparingly), oxycodone IR 5 mg q6hrs for severe pain, Gabapenin 100mg TID, Flexeril 5mg TID Lidocaine patch   Changes to old medications:  none   Medications that were stopped: none   Items to Follow up as outpatient: none   Physical exam at time of discharge:    VITALS:   T(C): 36.5 (05-15-23 @ 09:11), Max: 36.8 (05-14-23 @ 15:32)  HR: 75 (05-15-23 @ 09:11) (75 - 96)  BP: 134/59 (05-15-23 @ 09:11) (134/59 - 158/75)  RR: 18 (05-15-23 @ 09:11) (18 - 20)  SpO2: 98% (05-15-23 @ 09:11) (97% - 98%)    PHYSICAL EXAM:  General: Moderately distressed, AAOx3  HEENT: atraumatic, normocephalic  Pulmonary: clear to auscultation bilaterally; No wheeze  Cardiovascular: Regular rate and rhythm; no murmurs, rubs or gallops. Normal S1S2  Gastrointestinal: Soft, nontender, nondistended; bowel sounds present  Musculoskeletal: 2+ peripheral pulses, no clubbing, cyanosis or edema ecchymosis on left hip.   Neurology: Pt. alert and oriented, fluent speech, limited movement of left leg 2/2 fracture.   Skin: no rashes or lesions   89 y/o M F with PMH of DM, HTN, lumbar compression fracture, and b/l hip replacements presenting with left hip pain after a fall, found to have left periprosthetic fracture of the proximal left femur.     # Fracture, proximal femur  Patient sustained left periprosthetic fracture of the proximal left femur seen on CT scan after fall.  Ortho consulted, do not recommend acute surgical intervention.  Patient was evaluated by PT, recommend EMELY.    - Toe touch weight bearing LLE 4-6 weeks  - Follow up with Dr. Stewart outpatient  - Continue pain control: Tylenol 650mg po q6hrs for mild pain, Toradol 15mg po q8hrs PRN for moderate (using sparingly), oxycodone IR 5 mg q6hrs for severe pain   - Continue Gabapenin 100mg TID, flexeril 5mg TID, lidocaine patch.   - Continue PT/OT daily   - ICP/OOB as tolerated.    # Leukocytosis  RESOLVED- Leukocytosis on admission with neutrophilic predominance, likely reactive after fall and fracture.  UA without evidence of infection- no reported infectious symptoms per patient.  Patient remained afebrile, no cough, no dysuria, CXR without consolidation or infiltrate.      # Type 2 diabetes mellitus  Patient with history of diabetes, home medications metformin.  Continue home medications.     # HTN (hypertension)  Patient with history of hypertension, however not on any medications at home.  BP was likely initially elevated in setting of pain but removed over the last day.  Will continue to monitor off medications.       Patient was discharged to: [facility]  New medications:  Tylenol 650mg q6hrs for mild pain, Toradol 15mg q6hrs PRN for moderate (using sparingly), oxycodone IR 5 mg q6hrs for severe pain, Gabapenin 100mg TID, Flexeril 5mg TID Lidocaine patch   Changes to old medications:  none   Medications that were stopped: none   Items to Follow up as outpatient: none   Physical exam at time of discharge:    VITALS:   T(C): 36.5 (05-15-23 @ 09:11), Max: 36.8 (05-14-23 @ 15:32)  HR: 75 (05-15-23 @ 09:11) (75 - 96)  BP: 134/59 (05-15-23 @ 09:11) (134/59 - 158/75)  RR: 18 (05-15-23 @ 09:11) (18 - 20)  SpO2: 98% (05-15-23 @ 09:11) (97% - 98%)    PHYSICAL EXAM:  General: Moderately distressed, AAOx3  HEENT: atraumatic, normocephalic  Pulmonary: clear to auscultation bilaterally; No wheeze  Cardiovascular: Regular rate and rhythm; no murmurs, rubs or gallops. Normal S1S2  Gastrointestinal: Soft, nontender, nondistended; bowel sounds present  Musculoskeletal: 2+ peripheral pulses, no clubbing, cyanosis or edema ecchymosis on left hip.   Neurology: Pt. alert and oriented, fluent speech, limited movement of left leg 2/2 fracture.   Skin: no rashes or lesions   89 y/o M F with PMH of DM, HTN, lumbar compression fracture, and b/l hip replacements presenting with left hip pain after a fall, found to have left periprosthetic fracture of the proximal left femur.     # Fracture, proximal femur  Patient sustained left periprosthetic fracture of the proximal left femur seen on CT scan after fall.  Ortho consulted, do not recommend acute surgical intervention.  Patient was evaluated by PT, recommend EMELY.    - Toe touch weight bearing LLE 4-6 weeks  - Follow up with Dr. Stewart outpatient  - Continue pain control: Tylenol 650mg po q6hrs for mild pain, Toradol 10mg po q8hrs PRN for moderate (using sparingly), oxycodone IR 5 mg q6hrs for severe pain   - Continue Gabapenin 100mg TID, flexeril 5mg TID, lidocaine patch.   - Continue PT/OT daily   - ICP/OOB as tolerated.    # Leukocytosis  RESOLVED- Leukocytosis on admission with neutrophilic predominance, likely reactive after fall and fracture.  UA without evidence of infection- no reported infectious symptoms per patient.  Patient remained afebrile, no cough, no dysuria, CXR without consolidation or infiltrate.      # Type 2 diabetes mellitus  Patient with history of diabetes, home medications metformin.  Continue home medications.     # HTN (hypertension)  Patient with history of hypertension, however not on any medications at home.  BP was likely initially elevated in setting of pain but removed over the last day.  Will continue to monitor off medications.       Patient was discharged to: [facility]  New medications:  Tylenol 650mg q6hrs for mild pain, Toradol 15mg q6hrs PRN for moderate (using sparingly), oxycodone IR 5 mg q6hrs for severe pain, Gabapenin 100mg TID, Flexeril 5mg TID Lidocaine patch   Changes to old medications:  none   Medications that were stopped: none   Items to Follow up as outpatient: none   Physical exam at time of discharge:    VITALS:   T(C): 36.5 (05-15-23 @ 09:11), Max: 36.8 (05-14-23 @ 15:32)  HR: 75 (05-15-23 @ 09:11) (75 - 96)  BP: 134/59 (05-15-23 @ 09:11) (134/59 - 158/75)  RR: 18 (05-15-23 @ 09:11) (18 - 20)  SpO2: 98% (05-15-23 @ 09:11) (97% - 98%)    PHYSICAL EXAM:  General: Moderately distressed, AAOx3  HEENT: atraumatic, normocephalic  Pulmonary: clear to auscultation bilaterally; No wheeze  Cardiovascular: Regular rate and rhythm; no murmurs, rubs or gallops. Normal S1S2  Gastrointestinal: Soft, nontender, nondistended; bowel sounds present  Musculoskeletal: 2+ peripheral pulses, no clubbing, cyanosis or edema ecchymosis on left hip.   Neurology: Pt. alert and oriented, fluent speech, limited movement of left leg 2/2 fracture.   Skin: no rashes or lesions   87 y/o M F with PMH of DM, HTN, lumbar compression fracture, and b/l hip replacements presenting with left hip pain after a fall, found to have left periprosthetic fracture of the proximal left femur.     # Fracture, proximal femur  Patient sustained left periprosthetic fracture of the proximal left femur seen on CT scan after fall.  Ortho consulted, do not recommend acute surgical intervention.  Patient was evaluated by PT, recommend EMELY.    - Toe touch weight bearing LLE 4-6 weeks  - Follow up with Dr. Stewart outpatient  - Continue pain control: Tylenol 650mg po q6hrs for mild pain, Toradol 10mg po q8hrs PRN for moderate (using sparingly), oxycodone IR 5 mg q6hrs for severe pain   - Hold home Aspirin 81mg while patient in getting Toradol for pain.  Once pain improved and does not require NSAIDs please restart Aspirin 81mg   - Continue Gabapenin 100mg TID, flexeril 5mg TID, lidocaine patch.   - Continue PT/OT daily   - ICP/OOB as tolerated.    # Leukocytosis  RESOLVED- Leukocytosis on admission with neutrophilic predominance, likely reactive after fall and fracture.  UA without evidence of infection- no reported infectious symptoms per patient.  Patient remained afebrile, no cough, no dysuria, CXR without consolidation or infiltrate.      # Type 2 diabetes mellitus  Patient with history of diabetes, home medications metformin.  Continue home medications.     # HTN (hypertension)  Patient with history of hypertension, however not on any medications at home.  BP was likely initially elevated in setting of pain but removed over the last day.  Will continue to monitor off medications.       Patient was discharged to: [facility]  New medications:  Tylenol 650mg q6hrs for mild pain, Toradol 15mg q6hrs PRN for moderate (using sparingly), oxycodone IR 5 mg q6hrs for severe pain, Gabapenin 100mg TID, Flexeril 5mg TID Lidocaine patch   Changes to old medications:  none   Medications that were stopped: none   Items to Follow up as outpatient: none   Physical exam at time of discharge:    VITALS:   T(C): 36.5 (05-15-23 @ 09:11), Max: 36.8 (05-14-23 @ 15:32)  HR: 75 (05-15-23 @ 09:11) (75 - 96)  BP: 134/59 (05-15-23 @ 09:11) (134/59 - 158/75)  RR: 18 (05-15-23 @ 09:11) (18 - 20)  SpO2: 98% (05-15-23 @ 09:11) (97% - 98%)    PHYSICAL EXAM:  General: Moderately distressed, AAOx3  HEENT: atraumatic, normocephalic  Pulmonary: clear to auscultation bilaterally; No wheeze  Cardiovascular: Regular rate and rhythm; no murmurs, rubs or gallops. Normal S1S2  Gastrointestinal: Soft, nontender, nondistended; bowel sounds present  Musculoskeletal: 2+ peripheral pulses, no clubbing, cyanosis or edema ecchymosis on left hip.   Neurology: Pt. alert and oriented, fluent speech, limited movement of left leg 2/2 fracture.   Skin: no rashes or lesions   89 y/o M F with PMH of DM, HTN, lumbar compression fracture, and b/l hip replacements presenting with left hip pain after a fall, found to have left periprosthetic fracture of the proximal left femur.     # Fracture, proximal femur  Patient sustained left periprosthetic fracture of the proximal left femur seen on CT scan after fall.  Ortho consulted, do not recommend acute surgical intervention.  Patient was evaluated by PT, recommend EMELY.    - Toe touch weight bearing LLE 4-6 weeks  - Follow up with Dr. Stewart outpatient  - Continue pain control: Tylenol 650mg po q6hrs for mild pain, Toradol 10mg po q8hrs PRN for moderate (using sparingly), oxycodone IR 5 mg q6hrs for severe pain   - Hold home Aspirin 81mg while patient in getting Toradol for pain.  Once pain improved and does not require NSAIDs please restart Aspirin 81mg   - Continue Gabapentin 100mg TID, flexeril 5mg TID, lidocaine patch.   - Continue PT/OT daily   - ICP/OOB as tolerated.    # Leukocytosis  RESOLVED- Leukocytosis on admission with neutrophilic predominance, likely reactive after fall and fracture.  UA without evidence of infection- no reported infectious symptoms per patient.  Patient remained afebrile, no cough, no dysuria, CXR without consolidation or infiltrate.      # Type 2 diabetes mellitus  Patient with history of diabetes, home medications metformin.  Continue home medications.     # HTN (hypertension)  Patient with history of hypertension, however not on any medications at home.  BP was likely initially elevated in setting of pain but removed over the last day.  Will continue to monitor off medications.       Patient was discharged to: [facility]  New medications:  Tylenol 650mg q6hrs for mild pain, Toradol 15mg q6hrs PRN for moderate (using sparingly), oxycodone IR 5 mg q6hrs for severe pain, Gabapenin 100mg TID, Flexeril 5mg TID Lidocaine patch   Changes to old medications:  none   Medications that were stopped: none   Items to Follow up as outpatient: none   Physical exam at time of discharge:    VITALS:   T(C): 36.5 (05-15-23 @ 09:11), Max: 36.8 (05-14-23 @ 15:32)  HR: 75 (05-15-23 @ 09:11) (75 - 96)  BP: 134/59 (05-15-23 @ 09:11) (134/59 - 158/75)  RR: 18 (05-15-23 @ 09:11) (18 - 20)  SpO2: 98% (05-15-23 @ 09:11) (97% - 98%)    PHYSICAL EXAM:  General: Moderately distressed, AAOx3  HEENT: atraumatic, normocephalic  Pulmonary: clear to auscultation bilaterally; No wheeze  Cardiovascular: Regular rate and rhythm; no murmurs, rubs or gallops. Normal S1S2  Gastrointestinal: Soft, nontender, nondistended; bowel sounds present  Musculoskeletal: 2+ peripheral pulses, no clubbing, cyanosis or edema ecchymosis on left hip.   Neurology: Pt. alert and oriented, fluent speech, limited movement of left leg 2/2 fracture.   Skin: no rashes or lesions

## 2023-05-13 NOTE — PHYSICAL THERAPY INITIAL EVALUATION ADULT - PHYSICAL ASSIST/NONPHYSICAL ASSIST: SCOOT/BRIDGE, REHAB EVAL
Consider physical therapy, ibuprofen, heating pads and follow up with your family doctor for possible referred pain  Call for bleeding that occurs earlier than 21 days, lasts longer than 10 days or for bleeding between cycles 
1 person assist

## 2023-05-13 NOTE — DISCHARGE NOTE PROVIDER - PROVIDER TOKENS
PROVIDER:[TOKEN:[[id number]],FOLLOWUP:[1 month]] FREE:[LAST:[Terry],FIRST:[Jim ADAMES)],PHONE:[phone]],FAX:[(   )    -],ADDRESS:[[address] [place] [address]],SCHEDULEDAPPT:[05/25/2023],SCHEDULEDAPPTTIME:[02:15 PM]]

## 2023-05-13 NOTE — DIETITIAN INITIAL EVALUATION ADULT - NS FNS DIET ORDER
Diet, Consistent Carbohydrate w/Evening Snack:   Supplement Feeding Modality:  Oral  Ensure Enlive Cans or Servings Per Day:  1       Frequency:  Three Times a day (05-12-23 @ 16:25)

## 2023-05-13 NOTE — PHYSICAL THERAPY INITIAL EVALUATION ADULT - PERTINENT HX OF CURRENT PROBLEM, REHAB EVAL
89 y/o M F with PMH of DM, HTN, lumbar compression fracture, and b/l hip replacements presenting with left hip pain after a fall. Patient states she was going into the lobby of her building and the door was heavy and it swung back and hit her which caused her to fall backwards on her left side. She since then has had severe left hip pain. She denies LOC, head trauma, or chest pain/palpitations at the time of the event. She has hx of mechanical falls, she has previous hip fracture with prosthesis in place. She also has hx of L2 compression fracture hx along with osteonecrosis and was due to have a cortisone injection outpatient but did not make it to her appointment.     At this time she denies HA, dizziness, neck pain, chest pain, dyspnea, abd pain, N/V/D, Reports left hip pain, no pain on right hip or other joint pain.     Patient admitted for left periprosthetic fracture of the proximal left femur.

## 2023-05-13 NOTE — PHYSICAL THERAPY INITIAL EVALUATION ADULT - ADDITIONAL COMMENTS
Pt currently resides alone in elevator apt, ramp access to enter. Primarily amb w/ rollator. Experienced multiple falls within past 6 months. Indep w/ showering and dressing tasks. Has HHA 5 days a week from 10am-3pm to assist w/ home management.

## 2023-05-13 NOTE — DISCHARGE NOTE PROVIDER - NSDCMRMEDTOKEN_GEN_ALL_CORE_FT
acetaminophen 325 mg oral tablet: 3 tab(s) orally every 8 hours  aspirin 81 mg oral delayed release tablet: 1 tab(s) orally 2 times a day  bisacodyl 10 mg rectal suppository: 1 suppository(ies) rectal once a day, As needed, If no bowel movement  coloplast barrier cream: Apply topically to affected area 1 to 3 times a day     Disp: 2 standard tubes  metFORMIN 1000 mg oral tablet: 1 tab(s) orally 2 times a day  methocarbamol 750 mg oral tablet: 1 tab(s) orally 4 times a day, As Needed   oxyCODONE 5 mg oral tablet: 1 tab(s) orally every 4 hours, As needed, Severe Pain (7 - 10)   acetaminophen 325 mg oral tablet: 2 tab(s) orally every 6 hours  aspirin 81 mg oral delayed release tablet: 1 tab(s) orally 2 times a day  bisacodyl 10 mg rectal suppository: 1 suppository(ies) rectal once a day, As needed, If no bowel movement  coloplast barrier cream: Apply topically to affected area 1 to 3 times a day     Disp: 2 standard tubes  cyclobenzaprine 5 mg oral tablet: 1 tab(s) orally 3 times a day as needed for  muscle spasm  gabapentin 100 mg oral capsule: 1 cap(s) orally 3 times a day  ketorolac 10 mg oral tablet: 1 tab(s) orally every 6 hours As needed Moderate Pain (4 - 6)  Lidocare Pain Relief Patch 4% topical film: Apply topically to affected area once a day as needed for left hip pain  metFORMIN 1000 mg oral tablet: 1 tab(s) orally 2 times a day  oxyCODONE 5 mg oral tablet: 1 tab(s) orally every 4 hours, As needed, Severe Pain (7 - 10)  pantoprazole 40 mg oral delayed release tablet: 1 tab(s) orally once a day (before a meal)   acetaminophen 325 mg oral tablet: 2 tab(s) orally every 6 hours  bisacodyl 10 mg rectal suppository: 1 suppository(ies) rectal once a day, As needed, If no bowel movement  coloplast barrier cream: Apply topically to affected area 1 to 3 times a day     Disp: 2 standard tubes  cyclobenzaprine 5 mg oral tablet: 1 tab(s) orally 3 times a day as needed for  muscle spasm  gabapentin 100 mg oral capsule: 1 cap(s) orally 3 times a day  ketorolac 10 mg oral tablet: 1 tab(s) orally every 8 hours as needed for Moderate Pain (4 - 6)  Lidocare Pain Relief Patch 4% topical film: Apply topically to affected area once a day as needed for left hip pain  metFORMIN 1000 mg oral tablet: 1 tab(s) orally 2 times a day  oxyCODONE 5 mg oral tablet: 1 tab(s) orally every 6 hours as needed for , Severe Pain (7 - 10)  pantoprazole 40 mg oral delayed release tablet: 1 tab(s) orally once a day (before a meal)

## 2023-05-13 NOTE — DISCHARGE NOTE PROVIDER - DETAILS OF MALNUTRITION DIAGNOSIS/DIAGNOSES
This patient has been assessed with a concern for Malnutrition and was treated during this hospitalization for the following Nutrition diagnosis/diagnoses:     -  05/13/2023: Severe protein-calorie malnutrition

## 2023-05-13 NOTE — DIETITIAN INITIAL EVALUATION ADULT - OTHER CALCULATIONS
Based on Standards of Care pt within % IBW (110%) thus actual body weight used for all calculations (110.4#). Needs adjusted for advanced age and malnutrition.

## 2023-05-13 NOTE — PHYSICAL THERAPY INITIAL EVALUATION ADULT - MANUAL MUSCLE TESTING RESULTS, REHAB EVAL
Grossly 3/5 throughout RLE and BUE based on ability to perform movement against gravity. Grossly 2/5 throughout LLE pivotal movements primarily limited by pain.

## 2023-05-13 NOTE — DIETITIAN NUTRITION RISK NOTIFICATION - TREATMENT: THE FOLLOWING DIET HAS BEEN RECOMMENDED
Diet, Consistent Carbohydrate w/Evening Snack:   Supplement Feeding Modality:  Oral  Ensure Enlive Cans or Servings Per Day:  1       Frequency:  Three Times a day (05-12-23 @ 16:25) [Active]

## 2023-05-13 NOTE — DIETITIAN INITIAL EVALUATION ADULT - PERTINENT LABORATORY DATA
05-13    140  |  106  |  18  ----------------------------<  122<H>  4.0   |  26  |  0.82    Ca    9.2      13 May 2023 05:30  Phos  3.5     05-13  Mg     1.7     05-13    TPro  6.7  /  Alb  3.9  /  TBili  0.4  /  DBili  x   /  AST  19  /  ALT  18  /  AlkPhos  79  05-11  POCT Blood Glucose.: 120 mg/dL (05-13-23 @ 09:08)  A1C with Estimated Average Glucose Result: 6.7 % (05-13-23 @ 05:30)

## 2023-05-13 NOTE — DISCHARGE NOTE PROVIDER - CARE PROVIDERS DIRECT ADDRESSES
[email or web address] ,[email or web address]

## 2023-05-13 NOTE — DIETITIAN INITIAL EVALUATION ADULT - PERTINENT MEDS FT
MEDICATIONS  (STANDING):  dextrose 5%. 1000 milliLiter(s) (50 mL/Hr) IV Continuous <Continuous>  dextrose 5%. 1000 milliLiter(s) (100 mL/Hr) IV Continuous <Continuous>  dextrose 50% Injectable 25 Gram(s) IV Push once  dextrose 50% Injectable 12.5 Gram(s) IV Push once  dextrose 50% Injectable 25 Gram(s) IV Push once  enoxaparin Injectable 40 milliGRAM(s) SubCutaneous every 24 hours  glucagon  Injectable 1 milliGRAM(s) IntraMuscular once  insulin lispro (ADMELOG) corrective regimen sliding scale   SubCutaneous three times a day before meals  lidocaine   4% Patch 1 Patch Transdermal every 24 hours    MEDICATIONS  (PRN):  acetaminophen     Tablet .. 650 milliGRAM(s) Oral every 6 hours PRN Temp greater or equal to 38C (100.4F), Mild Pain (1 - 3)  dextrose Oral Gel 15 Gram(s) Oral once PRN Blood Glucose LESS THAN 70 milliGRAM(s)/deciliter  ketorolac   Injectable 15 milliGRAM(s) IV Push every 8 hours PRN Moderate Pain (4 - 6)  oxyCODONE    IR 5 milliGRAM(s) Oral every 6 hours PRN Severe Pain (7 - 10)

## 2023-05-13 NOTE — DISCHARGE NOTE PROVIDER - CARE PROVIDER_API CALL
Jim Medina)  Orthopaedic Surgery  [address] [place] [address]  Phone: [phone]  Fax: [phone]  Follow Up Time: 1 month   Jim Stewart)  [address]  Phone: [phone]  Fax: (   )    -  Scheduled Appointment: 05/25/2023 02:15 PM

## 2023-05-13 NOTE — PHYSICAL THERAPY INITIAL EVALUATION ADULT - BED MOBILITY LIMITATIONS, REHAB EVAL
Pt unable to tolerate full sitting at EOB - tolerated short sitting at EOB only 2/2 complaints of increasing pain w/ movement./decreased ability to use legs for bridging/pushing/impaired ability to control trunk for mobility

## 2023-05-14 LAB
ANION GAP SERPL CALC-SCNC: 10 MMOL/L — SIGNIFICANT CHANGE UP (ref 5–17)
BUN SERPL-MCNC: 19 MG/DL — SIGNIFICANT CHANGE UP (ref 7–23)
CALCIUM SERPL-MCNC: 9.3 MG/DL — SIGNIFICANT CHANGE UP (ref 8.4–10.5)
CHLORIDE SERPL-SCNC: 102 MMOL/L — SIGNIFICANT CHANGE UP (ref 96–108)
CO2 SERPL-SCNC: 27 MMOL/L — SIGNIFICANT CHANGE UP (ref 22–31)
CREAT SERPL-MCNC: 0.74 MG/DL — SIGNIFICANT CHANGE UP (ref 0.5–1.3)
EGFR: 79 ML/MIN/1.73M2 — SIGNIFICANT CHANGE UP
GLUCOSE BLDC GLUCOMTR-MCNC: 104 MG/DL — HIGH (ref 70–99)
GLUCOSE BLDC GLUCOMTR-MCNC: 108 MG/DL — HIGH (ref 70–99)
GLUCOSE BLDC GLUCOMTR-MCNC: 148 MG/DL — HIGH (ref 70–99)
GLUCOSE SERPL-MCNC: 123 MG/DL — HIGH (ref 70–99)
HCT VFR BLD CALC: 29.9 % — LOW (ref 34.5–45)
HGB BLD-MCNC: 9.7 G/DL — LOW (ref 11.5–15.5)
MAGNESIUM SERPL-MCNC: 1.9 MG/DL — SIGNIFICANT CHANGE UP (ref 1.6–2.6)
MCHC RBC-ENTMCNC: 29.7 PG — SIGNIFICANT CHANGE UP (ref 27–34)
MCHC RBC-ENTMCNC: 32.4 GM/DL — SIGNIFICANT CHANGE UP (ref 32–36)
MCV RBC AUTO: 91.4 FL — SIGNIFICANT CHANGE UP (ref 80–100)
NRBC # BLD: 0 /100 WBCS — SIGNIFICANT CHANGE UP (ref 0–0)
PHOSPHATE SERPL-MCNC: 3.6 MG/DL — SIGNIFICANT CHANGE UP (ref 2.5–4.5)
PLATELET # BLD AUTO: 253 K/UL — SIGNIFICANT CHANGE UP (ref 150–400)
POTASSIUM SERPL-MCNC: 3.9 MMOL/L — SIGNIFICANT CHANGE UP (ref 3.5–5.3)
POTASSIUM SERPL-SCNC: 3.9 MMOL/L — SIGNIFICANT CHANGE UP (ref 3.5–5.3)
RBC # BLD: 3.27 M/UL — LOW (ref 3.8–5.2)
RBC # FLD: 18 % — HIGH (ref 10.3–14.5)
SODIUM SERPL-SCNC: 139 MMOL/L — SIGNIFICANT CHANGE UP (ref 135–145)
WBC # BLD: 9.38 K/UL — SIGNIFICANT CHANGE UP (ref 3.8–10.5)
WBC # FLD AUTO: 9.38 K/UL — SIGNIFICANT CHANGE UP (ref 3.8–10.5)

## 2023-05-14 PROCEDURE — 99232 SBSQ HOSP IP/OBS MODERATE 35: CPT | Mod: GC

## 2023-05-14 PROCEDURE — 93010 ELECTROCARDIOGRAM REPORT: CPT

## 2023-05-14 RX ADMIN — GABAPENTIN 100 MILLIGRAM(S): 400 CAPSULE ORAL at 15:09

## 2023-05-14 RX ADMIN — GABAPENTIN 100 MILLIGRAM(S): 400 CAPSULE ORAL at 06:57

## 2023-05-14 RX ADMIN — OXYCODONE HYDROCHLORIDE 5 MILLIGRAM(S): 5 TABLET ORAL at 03:02

## 2023-05-14 RX ADMIN — Medication 650 MILLIGRAM(S): at 20:08

## 2023-05-14 RX ADMIN — PANTOPRAZOLE SODIUM 40 MILLIGRAM(S): 20 TABLET, DELAYED RELEASE ORAL at 06:57

## 2023-05-14 RX ADMIN — GABAPENTIN 100 MILLIGRAM(S): 400 CAPSULE ORAL at 21:38

## 2023-05-14 RX ADMIN — LIDOCAINE 1 PATCH: 4 CREAM TOPICAL at 15:09

## 2023-05-14 RX ADMIN — Medication 15 MILLIGRAM(S): at 20:08

## 2023-05-14 RX ADMIN — OXYCODONE HYDROCHLORIDE 5 MILLIGRAM(S): 5 TABLET ORAL at 02:10

## 2023-05-14 RX ADMIN — Medication 15 MILLIGRAM(S): at 11:37

## 2023-05-14 RX ADMIN — CYCLOBENZAPRINE HYDROCHLORIDE 5 MILLIGRAM(S): 10 TABLET, FILM COATED ORAL at 06:57

## 2023-05-14 RX ADMIN — LIDOCAINE 1 PATCH: 4 CREAM TOPICAL at 19:53

## 2023-05-14 RX ADMIN — CYCLOBENZAPRINE HYDROCHLORIDE 5 MILLIGRAM(S): 10 TABLET, FILM COATED ORAL at 15:09

## 2023-05-14 RX ADMIN — ENOXAPARIN SODIUM 40 MILLIGRAM(S): 100 INJECTION SUBCUTANEOUS at 19:43

## 2023-05-14 RX ADMIN — Medication 15 MILLIGRAM(S): at 11:07

## 2023-05-14 RX ADMIN — CYCLOBENZAPRINE HYDROCHLORIDE 5 MILLIGRAM(S): 10 TABLET, FILM COATED ORAL at 21:38

## 2023-05-14 NOTE — OCCUPATIONAL THERAPY INITIAL EVALUATION ADULT - DIAGNOSIS, OT EVAL
Pt. admitted to [facility] s/p mechanical fall, dx of left periprosthetic fracture of the proximal left femur. Upon assessment, pt. demo impairments in BUE/BLE strength, activity tolerance, balance and postural control as well as increased pain and difficulty following TTWB to LLE.

## 2023-05-14 NOTE — OCCUPATIONAL THERAPY INITIAL EVALUATION ADULT - ADDITIONAL COMMENTS
per pt. and medical record, pt currently resides alone in elevator apt, ramp access to enter. Primarily amb w/ rollator. Experienced multiple falls within past 6 months. Indep w/ ADLs. Has HHA 5 days a week from 10am-3pm to assist w/ home management.

## 2023-05-14 NOTE — OCCUPATIONAL THERAPY INITIAL EVALUATION ADULT - PERTINENT HX OF CURRENT PROBLEM, REHAB EVAL
89 y/o M F with PMH of DM, HTN, lumbar compression fracture, and b/l hip replacements presenting with left hip pain after a fall. Patient states she was going into the lobby of her building and the door was heavy and it swung back and hit her which caused her to fall backwards on her left side. She since then has had severe left hip pain. She denies LOC, head trauma, or chest pain/palpitations at the time of the event. She has hx of mechanical falls, she has previous hip fracture with prosthesis in place. She also has hx of L2 compression fracture hx along with osteonecrosis and was due to have a cortisone injection outpatient but did not make it to her appointment.

## 2023-05-14 NOTE — OCCUPATIONAL THERAPY INITIAL EVALUATION ADULT - MODIFIED CLINICAL TEST OF SENSORY INTEGRATION IN BALANCE TEST
Attempts to perform side steps or shuffling with RLE, however pt. unable at this time, Mod VCs to maintain TTWB LLE
Straight, Heterosexual

## 2023-05-14 NOTE — PROGRESS NOTE ADULT - SUBJECTIVE AND OBJECTIVE BOX
SUBJECTIVE  Patient is a 88y old Female who presents with a chief complaint of FALL: pain greatly improed at this time. No other acute complaints.     Vital Signs Last 24 Hrs  T(C): 36.5 (14 May 2023 05:44), Max: 36.9 (13 May 2023 21:26)  T(F): 97.7 (14 May 2023 05:44), Max: 98.4 (13 May 2023 21:26)  HR: 76 (14 May 2023 05:44) (70 - 88)  BP: 159/75 (14 May 2023 05:44) (147/52 - 159/75)  BP(mean): --  RR: 18 (14 May 2023 05:44) (16 - 18)  SpO2: 97% (14 May 2023 05:44) (97% - 100%)    Parameters below as of 14 May 2023 05:44  Patient On (Oxygen Delivery Method): room air          PHYSICAL EXAM:  General: Moderately distressed, AAOx3  HEENT: atraumatic, normocephalic  Pulmonary: clear to auscultation bilaterally; No wheeze  Cardiovascular: Regular rate and rhythm; no murmurs, rubs or gallops. Normal S1S2  Gastrointestinal: Soft, nontender, nondistended; bowel sounds present  Musculoskeletal: 2+ peripheral pulses, no clubbing, cyanosis or edema ecchymosis on left hip.   Neurology: Pt. alert and oriented, fluent speech, limited movement of left leg 2/2 fracture.   Skin: no rashes or lesions                            9.0    9.54  )-----------( 242      ( 13 May 2023 05:30 )             28.5   05-13    140  |  106  |  18  ----------------------------<  122<H>  4.0   |  26  |  0.82    Ca    9.2      13 May 2023 05:30  Phos  3.5     05-13  Mg     1.7     05-13

## 2023-05-14 NOTE — OCCUPATIONAL THERAPY INITIAL EVALUATION ADULT - PLANNED THERAPY INTERVENTIONS, OT EVAL
ADL retraining/IADL retraining/balance training/bed mobility training/cognitive, visual perceptual/strengthening/transfer training

## 2023-05-14 NOTE — OCCUPATIONAL THERAPY INITIAL EVALUATION ADULT - GENERAL OBSERVATIONS, REHAB EVAL
OT IE Complete. ANEESH Amanda clearing pt. for OOB. Pt. received seated EOB, +heplock, +bed alarm in NAD, agreeable to therapy session,

## 2023-05-15 LAB
GLUCOSE BLDC GLUCOMTR-MCNC: 117 MG/DL — HIGH (ref 70–99)
GLUCOSE BLDC GLUCOMTR-MCNC: 119 MG/DL — HIGH (ref 70–99)
GLUCOSE BLDC GLUCOMTR-MCNC: 210 MG/DL — HIGH (ref 70–99)

## 2023-05-15 PROCEDURE — 99239 HOSP IP/OBS DSCHRG MGMT >30: CPT | Mod: GC

## 2023-05-15 RX ORDER — GABAPENTIN 400 MG/1
1 CAPSULE ORAL
Qty: 0 | Refills: 0 | DISCHARGE
Start: 2023-05-15

## 2023-05-15 RX ORDER — KETOROLAC TROMETHAMINE 30 MG/ML
10 SYRINGE (ML) INJECTION EVERY 6 HOURS
Refills: 0 | Status: DISCONTINUED | OUTPATIENT
Start: 2023-05-15 | End: 2023-05-16

## 2023-05-15 RX ORDER — OXYCODONE HYDROCHLORIDE 5 MG/1
5 TABLET ORAL EVERY 6 HOURS
Refills: 0 | Status: DISCONTINUED | OUTPATIENT
Start: 2023-05-15 | End: 2023-05-17

## 2023-05-15 RX ORDER — CYCLOBENZAPRINE HYDROCHLORIDE 10 MG/1
1 TABLET, FILM COATED ORAL
Qty: 0 | Refills: 0 | DISCHARGE
Start: 2023-05-15

## 2023-05-15 RX ORDER — KETOROLAC TROMETHAMINE 30 MG/ML
1 SYRINGE (ML) INJECTION
Qty: 0 | Refills: 0 | DISCHARGE
Start: 2023-05-15

## 2023-05-15 RX ORDER — LIDOCAINE 4 G/100G
1 CREAM TOPICAL
Qty: 0 | Refills: 0 | DISCHARGE
Start: 2023-05-15

## 2023-05-15 RX ORDER — PANTOPRAZOLE SODIUM 20 MG/1
1 TABLET, DELAYED RELEASE ORAL
Qty: 0 | Refills: 0 | DISCHARGE
Start: 2023-05-15

## 2023-05-15 RX ADMIN — OXYCODONE HYDROCHLORIDE 5 MILLIGRAM(S): 5 TABLET ORAL at 23:22

## 2023-05-15 RX ADMIN — PANTOPRAZOLE SODIUM 40 MILLIGRAM(S): 20 TABLET, DELAYED RELEASE ORAL at 06:25

## 2023-05-15 RX ADMIN — GABAPENTIN 100 MILLIGRAM(S): 400 CAPSULE ORAL at 06:25

## 2023-05-15 RX ADMIN — CYCLOBENZAPRINE HYDROCHLORIDE 5 MILLIGRAM(S): 10 TABLET, FILM COATED ORAL at 21:44

## 2023-05-15 RX ADMIN — Medication 650 MILLIGRAM(S): at 23:22

## 2023-05-15 RX ADMIN — GABAPENTIN 100 MILLIGRAM(S): 400 CAPSULE ORAL at 14:07

## 2023-05-15 RX ADMIN — Medication 650 MILLIGRAM(S): at 21:44

## 2023-05-15 RX ADMIN — ENOXAPARIN SODIUM 40 MILLIGRAM(S): 100 INJECTION SUBCUTANEOUS at 17:04

## 2023-05-15 RX ADMIN — CYCLOBENZAPRINE HYDROCHLORIDE 5 MILLIGRAM(S): 10 TABLET, FILM COATED ORAL at 06:25

## 2023-05-15 RX ADMIN — CYCLOBENZAPRINE HYDROCHLORIDE 5 MILLIGRAM(S): 10 TABLET, FILM COATED ORAL at 14:07

## 2023-05-15 RX ADMIN — GABAPENTIN 100 MILLIGRAM(S): 400 CAPSULE ORAL at 21:44

## 2023-05-15 RX ADMIN — Medication 4: at 14:07

## 2023-05-15 RX ADMIN — OXYCODONE HYDROCHLORIDE 5 MILLIGRAM(S): 5 TABLET ORAL at 22:45

## 2023-05-15 NOTE — PROGRESS NOTE ADULT - SUBJECTIVE AND OBJECTIVE BOX
Ortho Note    Pt seen and examined on morning rounds. Pt endorses that she is able to mobilize OOB on her own. Only able to take a few steps with PT with pain.  Denies CP, SOB, N/V, numbness/tingling     Vital Signs Last 24 Hrs  T(C): --  T(F): --  HR: --  BP: --  BP(mean): --  RR: --  SpO2: --  I&O's Summary      Physical Exam:  LLE  Skin intact no open injuries; TTP to right hip/groin  Unable to SLR or flex/ext at knee 2/2 pain, PF/DF intact, toes mobile  EHL/TA/GS intact to motor 5/5  SILT Saph/sofie/sp/dp/tib  Palpable DP & PT  Brisk cap refill distally                          9.7    9.38  )-----------( 253      ( 14 May 2023 11:11 )             29.9     05-14    139  |  102  |  19  ----------------------------<  123<H>  3.9   |  27  |  0.74    Ca    9.3      14 May 2023 11:11  Phos  3.6     05-14  Mg     1.9     05-14        A/P: 88yFemale with [place] B  periprosthetic hip fracture.    -No acute orthopedic surgical intervention at this time  -Recommend PT/OT   -Toe touch weight bearing LLE 4-6 weeks  -Pain rx per primary  -DVT ppx per primary  -Follow up with Dr. Stewart outpatient    Adalberto Matt, PGY-2  Ortho Pager [phone]

## 2023-05-15 NOTE — CONSULT NOTE ADULT - ASSESSMENT
IM    88 y o M F with PMH of DM, HTN, lumbar compression fracture, and b/l hip replacements presenting with left hip pain after a fall, found to have left periprosthetic fracture of the proximal left femur.     Nutritional Assessment:  · Nutritional Assessment	This patient has been assessed with a concern for Malnutrition and has been determined to have a diagnosis/diagnoses of Severe protein-calorie malnutrition.    This patient is being managed with:   Diet Consistent Carbohydrate w/Evening Snack-  Supplement Feeding Modality:  Oral  Glucerna Shake Cans or Servings Per Day:  2       Frequency:  Daily  Entered: May 13 2023 11:32AM    Problem/Plan - 1:  ·  Problem: Fracture, proximal femur.   ·  Plan: - patient sustained left periprosthetic fracture of the proximal left femur seen on CT scan after fall   - ortho consulted: no acute surgical intervention     Plan   - pain control: tylenol mild pain, toradol moderate (using sparingly), oxycodone severe pain   - gabapenin 100mg TID and flexeril 5mg TID.   - PT/OT  - lidocaine patch   - ICP/OOB as tolerated.    Problem/Plan - 2:  ·  Problem: Leukocytosis.   ·  Plan: Resolved.    Problem/Plan - 3:  ·  Problem: Type 2 diabetes mellitus.   ·  Plan: - on metformin at home, continue with moderate sliding scale  - monitor FSG, goal 150-180, adjust insulin regimen as needed.    Problem/Plan - 4:  ·  Problem: HTN (hypertension).   ·  Plan: - hx of HTN, however not on any medications   - believe elevated BP likely 2/2 pain and anxiety   - continue to monitor BP and pain control as above   - if consistently elevated and pain is controlled , can consider starting BP med.    Problem/Plan - 5:  ·  Problem: Preventive measure.   ·  Plan: F: none  E: replete as needed  N: consistent carb   DVT: lovenox

## 2023-05-15 NOTE — PROGRESS NOTE ADULT - SUBJECTIVE AND OBJECTIVE BOX
CRYSTAL MONAE 88y Female  MRN#: [id number]   CODE STATUS: FULL      SUBJECTIVE  Patient is a 88y old Female who presents with a chief complaint of fall, left hip fracture (15 May 2023 13:03)  Currently admitted to medicine with the primary diagnosis of Fracture, proximal femur    Today is hospital day 3d, and this morning she is laying in bed comfortably and reports overall improvement in her leg pain.     Present Today:           Horne Catheter (x)No/ ()Yes? Indication:          Central Line (x)No/ ()Yes? Indication:          IV Fluids (x)No/ ()Yes? Type:  Rate:  Indication:      OBJECTIVE  PAST MEDICAL & SURGICAL HISTORY  Diabetes    Hypertension    History of bilateral hip arthroplasty      Home Meds:  acetaminophen 325 mg oral tablet: 3 tab(s) orally every 8 hours  aspirin 81 mg oral delayed release tablet: 1 tab(s) orally 2 times a day  bisacodyl 10 mg rectal suppository: 1 suppository(ies) rectal once a day, As needed, If no bowel movement  coloplast barrier cream: Apply topically to affected area 1 to 3 times a day     Disp: 2 standard tubes  metFORMIN 1000 mg oral tablet: 1 tab(s) orally 2 times a day  methocarbamol 750 mg oral tablet: 1 tab(s) orally 4 times a day, As Needed   oxyCODONE 5 mg oral tablet: 1 tab(s) orally every 4 hours, As needed, Severe Pain (7 - 10)    ALLERGIES:  No Known Allergies    MEDICATIONS:  STANDING MEDICATIONS  cyclobenzaprine 5 milliGRAM(s) Oral three times a day  dextrose 5%. 1000 milliLiter(s) IV Continuous <Continuous>  dextrose 5%. 1000 milliLiter(s) IV Continuous <Continuous>  dextrose 50% Injectable 12.5 Gram(s) IV Push once  dextrose 50% Injectable 25 Gram(s) IV Push once  dextrose 50% Injectable 25 Gram(s) IV Push once  enoxaparin Injectable 40 milliGRAM(s) SubCutaneous every 24 hours  gabapentin 100 milliGRAM(s) Oral three times a day  glucagon  Injectable 1 milliGRAM(s) IntraMuscular once  insulin lispro (ADMELOG) corrective regimen sliding scale   SubCutaneous three times a day before meals  lidocaine   4% Patch 1 Patch Transdermal every 24 hours  pantoprazole    Tablet 40 milliGRAM(s) Oral before breakfast    PRN MEDICATIONS  acetaminophen     Tablet .. 650 milliGRAM(s) Oral every 6 hours PRN  dextrose Oral Gel 15 Gram(s) Oral once PRN  ketorolac   Injectable 15 milliGRAM(s) IV Push every 8 hours PRN  oxyCODONE    IR 5 milliGRAM(s) Oral every 6 hours PRN      VITAL SIGNS: Last 24 Hours  T(C): 36.5 (15 May 2023 09:11), Max: 36.8 (14 May 2023 15:32)  T(F): 97.7 (15 May 2023 09:11), Max: 98.2 (14 May 2023 15:32)  HR: 75 (15 May 2023 09:11) (75 - 96)  BP: 134/59 (15 May 2023 09:11) (134/59 - 158/75)  BP(mean): --  RR: 18 (15 May 2023 09:11) (18 - 20)  SpO2: 98% (15 May 2023 09:11) (97% - 98%)    LABS:                        9.7    9.38  )-----------( 253      ( 14 May 2023 11:11 )             29.9     05-14    139  |  102  |  19  ----------------------------<  123<H>  3.9   |  27  |  0.74    Ca    9.3      14 May 2023 11:11  Phos  3.6     05-14  Mg     1.9     05-14                    RADIOLOGY:      PHYSICAL EXAM:  General: NAD, AAOx3  HEENT: atraumatic, normocephalic  Pulmonary: clear to auscultation bilaterally; No wheeze  Cardiovascular: Regular rate and rhythm; no murmurs, rubs or gallops. Normal S1S2  Gastrointestinal: Soft, nontender, nondistended; bowel sounds present  Musculoskeletal: 2+ peripheral pulses, no clubbing, cyanosis or edema, ecchymosis on left hip.   Neurology: Pt. alert and oriented, fluent speech, able to move all extremities  Skin: no rashes or lesions

## 2023-05-16 PROBLEM — I10 ESSENTIAL (PRIMARY) HYPERTENSION: Chronic | Status: ACTIVE | Noted: 2023-05-12

## 2023-05-16 LAB
GLUCOSE BLDC GLUCOMTR-MCNC: 126 MG/DL — HIGH (ref 70–99)
GLUCOSE BLDC GLUCOMTR-MCNC: 153 MG/DL — HIGH (ref 70–99)
GLUCOSE BLDC GLUCOMTR-MCNC: 166 MG/DL — HIGH (ref 70–99)
GLUCOSE BLDC GLUCOMTR-MCNC: 238 MG/DL — HIGH (ref 70–99)

## 2023-05-16 PROCEDURE — 99232 SBSQ HOSP IP/OBS MODERATE 35: CPT | Mod: GC

## 2023-05-16 RX ORDER — IBUPROFEN 200 MG
400 TABLET ORAL EVERY 6 HOURS
Refills: 0 | Status: DISCONTINUED | OUTPATIENT
Start: 2023-05-16 | End: 2023-05-17

## 2023-05-16 RX ADMIN — GABAPENTIN 100 MILLIGRAM(S): 400 CAPSULE ORAL at 13:56

## 2023-05-16 RX ADMIN — GABAPENTIN 100 MILLIGRAM(S): 400 CAPSULE ORAL at 06:07

## 2023-05-16 RX ADMIN — ENOXAPARIN SODIUM 40 MILLIGRAM(S): 100 INJECTION SUBCUTANEOUS at 18:25

## 2023-05-16 RX ADMIN — PANTOPRAZOLE SODIUM 40 MILLIGRAM(S): 20 TABLET, DELAYED RELEASE ORAL at 06:08

## 2023-05-16 RX ADMIN — CYCLOBENZAPRINE HYDROCHLORIDE 5 MILLIGRAM(S): 10 TABLET, FILM COATED ORAL at 06:07

## 2023-05-16 RX ADMIN — Medication 2: at 18:25

## 2023-05-16 RX ADMIN — OXYCODONE HYDROCHLORIDE 5 MILLIGRAM(S): 5 TABLET ORAL at 23:53

## 2023-05-16 RX ADMIN — GABAPENTIN 100 MILLIGRAM(S): 400 CAPSULE ORAL at 22:39

## 2023-05-16 RX ADMIN — OXYCODONE HYDROCHLORIDE 5 MILLIGRAM(S): 5 TABLET ORAL at 23:01

## 2023-05-16 RX ADMIN — CYCLOBENZAPRINE HYDROCHLORIDE 5 MILLIGRAM(S): 10 TABLET, FILM COATED ORAL at 13:56

## 2023-05-16 RX ADMIN — CYCLOBENZAPRINE HYDROCHLORIDE 5 MILLIGRAM(S): 10 TABLET, FILM COATED ORAL at 22:39

## 2023-05-16 RX ADMIN — Medication 4: at 13:54

## 2023-05-16 NOTE — PROGRESS NOTE ADULT - SUBJECTIVE AND OBJECTIVE BOX
CRYSTAL MONAE 88y Female  MRN#: [id number]   CODE STATUS: FULL      SUBJECTIVE  Patient is a 88y old Female who presents with a chief complaint of fall, left hip fracture (16 May 2023 12:30)  Currently admitted to medicine with the primary diagnosis of Fracture, proximal femur    Today is hospital day 4d, and this morning she is sitting in bed with improved pain.     Present Today:           Horne Catheter (x)No/ ()Yes? Indication:          Central Line (x)No/ ()Yes? Indication:          IV Fluids (x)No/ ()Yes? Type:  Rate:  Indication:      OBJECTIVE  PAST MEDICAL & SURGICAL HISTORY  Diabetes    Hypertension    History of bilateral hip arthroplasty      Home Meds:  acetaminophen 325 mg oral tablet: 2 tab(s) orally every 6 hours  bisacodyl 10 mg rectal suppository: 1 suppository(ies) rectal once a day, As needed, If no bowel movement  coloplast barrier cream: Apply topically to affected area 1 to 3 times a day     Disp: 2 standard tubes  cyclobenzaprine 5 mg oral tablet: 1 tab(s) orally 3 times a day as needed for  muscle spasm  gabapentin 100 mg oral capsule: 1 cap(s) orally 3 times a day  ketorolac 10 mg oral tablet: 1 tab(s) orally every 8 hours as needed for Moderate Pain (4 - 6)  Lidocare Pain Relief Patch 4% topical film: Apply topically to affected area once a day as needed for left hip pain  metFORMIN 1000 mg oral tablet: 1 tab(s) orally 2 times a day  oxyCODONE 5 mg oral tablet: 1 tab(s) orally every 6 hours as needed for , Severe Pain (7 - 10)  pantoprazole 40 mg oral delayed release tablet: 1 tab(s) orally once a day (before a meal)    ALLERGIES:  No Known Allergies    MEDICATIONS:  STANDING MEDICATIONS  cyclobenzaprine 5 milliGRAM(s) Oral three times a day  dextrose 5%. 1000 milliLiter(s) IV Continuous <Continuous>  dextrose 5%. 1000 milliLiter(s) IV Continuous <Continuous>  dextrose 50% Injectable 12.5 Gram(s) IV Push once  dextrose 50% Injectable 25 Gram(s) IV Push once  dextrose 50% Injectable 25 Gram(s) IV Push once  enoxaparin Injectable 40 milliGRAM(s) SubCutaneous every 24 hours  gabapentin 100 milliGRAM(s) Oral three times a day  glucagon  Injectable 1 milliGRAM(s) IntraMuscular once  insulin lispro (ADMELOG) corrective regimen sliding scale   SubCutaneous three times a day before meals  lidocaine   4% Patch 1 Patch Transdermal every 24 hours  pantoprazole    Tablet 40 milliGRAM(s) Oral before breakfast    PRN MEDICATIONS  acetaminophen     Tablet .. 650 milliGRAM(s) Oral every 6 hours PRN  dextrose Oral Gel 15 Gram(s) Oral once PRN  ibuprofen  Tablet. 400 milliGRAM(s) Oral every 6 hours PRN  oxyCODONE    IR 5 milliGRAM(s) Oral every 6 hours PRN      VITAL SIGNS: Last 24 Hours  T(C): 36.3 (16 May 2023 10:50), Max: 36.3 (15 May 2023 15:20)  T(F): 97.4 (16 May 2023 10:50), Max: 97.4 (15 May 2023 15:20)  HR: 85 (16 May 2023 10:50) (72 - 85)  BP: 147/63 (16 May 2023 10:50) (143/64 - 159/71)  BP(mean): --  RR: 18 (16 May 2023 10:50) (18 - 19)  SpO2: 98% (16 May 2023 10:50) (95% - 99%)    LABS:                        RADIOLOGY:      PHYSICAL EXAM:  General: NAD, AAOx3  HEENT: atraumatic, normocephalic  Pulmonary: clear to auscultation bilaterally; No wheeze  Cardiovascular: Regular rate and rhythm; no murmurs, rubs or gallops. Normal S1S2  Gastrointestinal: Soft, nontender, nondistended; bowel sounds present  Musculoskeletal: 2+ peripheral pulses, no clubbing, cyanosis or edema, ecchymosis over left hip and medial groin. Stable from prior  Neurology: Pt. alert and oriented, fluent speech, able to move all extremities  Skin: no rashes or lesions

## 2023-05-16 NOTE — PROGRESS NOTE ADULT - SUBJECTIVE AND OBJECTIVE BOX
Ortho Note    Pt seen and examined on morning rounds. Pt comfortable without complaints, pain controlled.  Denies CP, SOB, N/V, numbness/tingling     Vital Signs Last 24 Hrs  T(C): 36.3 (05-16-23 @ 05:52), Max: 36.3 (05-16-23 @ 05:52)  T(F): 97.4 (05-16-23 @ 05:52), Max: 97.4 (05-16-23 @ 05:52)  HR: 85 (05-16-23 @ 05:52) (85 - 85)  BP: 148/74 (05-16-23 @ 05:52) (148/74 - 148/74)  BP(mean): --  RR: 19 (05-16-23 @ 05:52) (19 - 19)  SpO2: 99% (05-16-23 @ 05:52) (99% - 99%)  I&O's Summary      Physical Exam:  LLE  Skin intact no open injuries; TTP to right hip/groin  Unable to SLR or flex/ext at knee 2/2 pain, PF/DF intact, toes mobile  EHL/TA/GS intact to motor 5/5  SILT Saph/sofie/sp/dp/tib  Palpable DP & PT  Brisk cap refill distally                          9.7    9.38  )-----------( 253      ( 14 May 2023 11:11 )             29.9     05-14    139  |  102  |  19  ----------------------------<  123<H>  3.9   |  27  |  0.74    Ca    9.3      14 May 2023 11:11  Phos  3.6     05-14  Mg     1.9     05-14        A/P: 88yFemale with [place] B  periprosthetic hip fracture.    -No acute orthopedic surgical intervention at this time  -Recommend PT/OT   -Toe touch weight bearing LLE 4-6 weeks  -Pain rx per primary  -DVT ppx per primary  -Follow up with Dr. Stewart outpatient      Adalberto Matt, PGY-2  Ortho Pager [phone]

## 2023-05-17 ENCOUNTER — TRANSCRIPTION ENCOUNTER (OUTPATIENT)
Age: 88
End: 2023-05-17

## 2023-05-17 VITALS
HEART RATE: 98 BPM | RESPIRATION RATE: 18 BRPM | DIASTOLIC BLOOD PRESSURE: 62 MMHG | TEMPERATURE: 98 F | OXYGEN SATURATION: 98 % | SYSTOLIC BLOOD PRESSURE: 108 MMHG

## 2023-05-17 LAB
GLUCOSE BLDC GLUCOMTR-MCNC: 121 MG/DL — HIGH (ref 70–99)
GLUCOSE BLDC GLUCOMTR-MCNC: 125 MG/DL — HIGH (ref 70–99)
GLUCOSE BLDC GLUCOMTR-MCNC: 155 MG/DL — HIGH (ref 70–99)

## 2023-05-17 PROCEDURE — 83036 HEMOGLOBIN GLYCOSYLATED A1C: CPT

## 2023-05-17 PROCEDURE — 84100 ASSAY OF PHOSPHORUS: CPT

## 2023-05-17 PROCEDURE — 85730 THROMBOPLASTIN TIME PARTIAL: CPT

## 2023-05-17 PROCEDURE — 99232 SBSQ HOSP IP/OBS MODERATE 35: CPT | Mod: GC

## 2023-05-17 PROCEDURE — 80048 BASIC METABOLIC PNL TOTAL CA: CPT

## 2023-05-17 PROCEDURE — 99285 EMERGENCY DEPT VISIT HI MDM: CPT | Mod: 25

## 2023-05-17 PROCEDURE — 36415 COLL VENOUS BLD VENIPUNCTURE: CPT

## 2023-05-17 PROCEDURE — 97535 SELF CARE MNGMENT TRAINING: CPT

## 2023-05-17 PROCEDURE — 85027 COMPLETE CBC AUTOMATED: CPT

## 2023-05-17 PROCEDURE — 70450 CT HEAD/BRAIN W/O DYE: CPT

## 2023-05-17 PROCEDURE — 97165 OT EVAL LOW COMPLEX 30 MIN: CPT

## 2023-05-17 PROCEDURE — 85610 PROTHROMBIN TIME: CPT

## 2023-05-17 PROCEDURE — 73552 X-RAY EXAM OF FEMUR 2/>: CPT

## 2023-05-17 PROCEDURE — 73700 CT LOWER EXTREMITY W/O DYE: CPT

## 2023-05-17 PROCEDURE — 83735 ASSAY OF MAGNESIUM: CPT

## 2023-05-17 PROCEDURE — 73562 X-RAY EXAM OF KNEE 3: CPT

## 2023-05-17 PROCEDURE — 97161 PT EVAL LOW COMPLEX 20 MIN: CPT

## 2023-05-17 PROCEDURE — 82962 GLUCOSE BLOOD TEST: CPT

## 2023-05-17 PROCEDURE — 96376 TX/PRO/DX INJ SAME DRUG ADON: CPT

## 2023-05-17 PROCEDURE — 96375 TX/PRO/DX INJ NEW DRUG ADDON: CPT

## 2023-05-17 PROCEDURE — 97530 THERAPEUTIC ACTIVITIES: CPT

## 2023-05-17 PROCEDURE — 96374 THER/PROPH/DIAG INJ IV PUSH: CPT

## 2023-05-17 PROCEDURE — 80053 COMPREHEN METABOLIC PANEL: CPT

## 2023-05-17 PROCEDURE — 93005 ELECTROCARDIOGRAM TRACING: CPT

## 2023-05-17 PROCEDURE — 83605 ASSAY OF LACTIC ACID: CPT

## 2023-05-17 PROCEDURE — 81003 URINALYSIS AUTO W/O SCOPE: CPT

## 2023-05-17 PROCEDURE — 85025 COMPLETE CBC W/AUTO DIFF WBC: CPT

## 2023-05-17 PROCEDURE — 73502 X-RAY EXAM HIP UNI 2-3 VIEWS: CPT

## 2023-05-17 PROCEDURE — 97116 GAIT TRAINING THERAPY: CPT

## 2023-05-17 PROCEDURE — 71045 X-RAY EXAM CHEST 1 VIEW: CPT

## 2023-05-17 RX ADMIN — Medication 400 MILLIGRAM(S): at 14:01

## 2023-05-17 RX ADMIN — CYCLOBENZAPRINE HYDROCHLORIDE 5 MILLIGRAM(S): 10 TABLET, FILM COATED ORAL at 13:01

## 2023-05-17 RX ADMIN — Medication 2: at 13:27

## 2023-05-17 RX ADMIN — OXYCODONE HYDROCHLORIDE 5 MILLIGRAM(S): 5 TABLET ORAL at 05:52

## 2023-05-17 RX ADMIN — PANTOPRAZOLE SODIUM 40 MILLIGRAM(S): 20 TABLET, DELAYED RELEASE ORAL at 05:54

## 2023-05-17 RX ADMIN — OXYCODONE HYDROCHLORIDE 5 MILLIGRAM(S): 5 TABLET ORAL at 07:27

## 2023-05-17 RX ADMIN — GABAPENTIN 100 MILLIGRAM(S): 400 CAPSULE ORAL at 05:52

## 2023-05-17 RX ADMIN — GABAPENTIN 100 MILLIGRAM(S): 400 CAPSULE ORAL at 13:01

## 2023-05-17 RX ADMIN — Medication 400 MILLIGRAM(S): at 13:01

## 2023-05-17 RX ADMIN — CYCLOBENZAPRINE HYDROCHLORIDE 5 MILLIGRAM(S): 10 TABLET, FILM COATED ORAL at 05:52

## 2023-05-17 RX ADMIN — LIDOCAINE 1 PATCH: 4 CREAM TOPICAL at 13:00

## 2023-05-17 NOTE — CHART NOTE - NSCHARTNOTEFT_GEN_A_CORE
Admitting Diagnosis:   Patient is a 88y old  Female who presents with a chief complaint of fall, left hip fracture (17 May 2023 13:41)      PAST MEDICAL & SURGICAL HISTORY:  Diabetes  Hypertension  History of bilateral hip arthroplasty      Current Nutrition Order: Consistent carbohydrates, Glucerna BID       PO Intake: Good (%) [  x ]  Fair (50-75%) [   ] Poor (<25%) [   ]    GI Issues: Denies NVDC    Skin Integrity: +1 L hip edema      CAPILLARY BLOOD GLUCOSE      POCT Blood Glucose.: 155 mg/dL (17 May 2023 13:23)  POCT Blood Glucose.: 121 mg/dL (17 May 2023 09:29)  POCT Blood Glucose.: 166 mg/dL (16 May 2023 21:57)  POCT Blood Glucose.: 153 mg/dL (16 May 2023 17:49)      Medications:  MEDICATIONS  (STANDING):  cyclobenzaprine 5 milliGRAM(s) Oral three times a day  dextrose 5%. 1000 milliLiter(s) (50 mL/Hr) IV Continuous <Continuous>  dextrose 5%. 1000 milliLiter(s) (100 mL/Hr) IV Continuous <Continuous>  dextrose 50% Injectable 25 Gram(s) IV Push once  dextrose 50% Injectable 12.5 Gram(s) IV Push once  dextrose 50% Injectable 25 Gram(s) IV Push once  enoxaparin Injectable 40 milliGRAM(s) SubCutaneous every 24 hours  gabapentin 100 milliGRAM(s) Oral three times a day  glucagon  Injectable 1 milliGRAM(s) IntraMuscular once  insulin lispro (ADMELOG) corrective regimen sliding scale   SubCutaneous three times a day before meals  lidocaine   4% Patch 1 Patch Transdermal every 24 hours  pantoprazole    Tablet 40 milliGRAM(s) Oral before breakfast    MEDICATIONS  (PRN):  acetaminophen     Tablet .. 650 milliGRAM(s) Oral every 6 hours PRN Temp greater or equal to 38C (100.4F), Mild Pain (1 - 3)  dextrose Oral Gel 15 Gram(s) Oral once PRN Blood Glucose LESS THAN 70 milliGRAM(s)/deciliter  ibuprofen  Tablet. 400 milliGRAM(s) Oral every 6 hours PRN Moderate Pain (4 - 6)  oxyCODONE    IR 5 milliGRAM(s) Oral every 6 hours PRN Severe Pain (7 - 10)      Weight:  Height for BMI (FEET)	5 Feet  Height for BMI (INCHES)	0 Inch(s)  Height for BMI (CENTIMETERS)	152.4 Centimeter(s)  Weight for BMI (lbs)	110.4 lb  Weight for BMI (kg)	50.1 kg  Body Mass Index	21.5    Weight Change:     Nutrition Focused Physical Exam: Completed [   ]  Not Pertinent [   ]  Muscle Wasting- Temporal [   ]  Clavicle/Pectoral [   ]  Shoulder/Deltoid [   ]  Scapula [   ]  Interosseous [   ]  Quadriceps [   ]  Gastrocnemius [   ]  Fat Wasting- Orbital [   ]  Buccal [   ]  Triceps [   ]  Rib [   ]  Suspect [PCM] 2/2 to physical assessment, [poor intake], and [wt loss]; please see malnutrition chart note.    Estimated energy needs:   Estimated Energy Needs Weight (lbs)	110.4 lb  Estimated Energy Needs Weight (kg)	50 kg  Estimated Energy Needs From (herber/kg)	30  Estimated Energy Needs To (herber/kg)	35  Estimated Energy Needs Calculated From (herber/kg)	1500  Estimated Energy Needs Calculated To (ehrber/kg)	1750    Estimated Protein Needs Weight (lbs)	110.4 lb  Estimated Protein Needs Weight (kg)	50 kg  Estimated Protein Needs From (g/kg)	1.2  Estimated Protein Needs To (g/kg)	1.4  Estimated Protein Needs Calculated From (g/kg)	60  Estimated Protein Needs Calculated To (g/kg)	70    Subjective: 89 y/o M F with PMH of DM, HTN, lumbar compression fracture, and b/l hip replacements presenting with left hip pain after a fall. Patient states she was going into the lobby of her building and the door was heavy and it swung back and hit her which caused her to fall backwards on her left side. She since then has had severe left hip pain. She denies LOC, head trauma, or chest pain/palpitations at the time of the event. She has hx of mechanical falls, she has previous hip fracture with prosthesis in place. She also has hx of L2 compression fracture hx along with osteonecrosis and was due to have a cortisone injection outpatient but did not make it to her appointment.     Pt seen at bedside for follow up assessment. Current diet order: consistent carbohydrates, Glucerna BID. Observed breakfast tray, consumed 100% and is drinking some of the Glucerna. BS elevated-insulin regimen as ordered. RD to f/u prn.    Previous Nutrition Diagnosis: Sevre PCM in context of chronic illness r/t decreased appetite/PO intake AEB <75% of nutrition needs >1 month, severe muscle loss.    Active [  x ]  Resolved [   ]    Goal: Pt to meet at least 75% of nutritional needs consistently     Recommendations:  1. Continue with diet order-continue Glucerna 2x/day (220 kcal,10 g protein per serving)   2. Encourage pt to meed nutritional needs as able   3. Monitor labs: electrolytes, cmp, fingesticks  4. Monitor weights   5. Pain and bowel regimen per team   6. Will continue to assess/honor food preferences as able    Education: deferred at this time    Risk Level: High [   ] Moderate [ x  ] Low [   ]

## 2023-05-17 NOTE — PROGRESS NOTE ADULT - REASON FOR ADMISSION
fall, left hip fracture

## 2023-05-17 NOTE — PROGRESS NOTE ADULT - PROVIDER SPECIALTY LIST ADULT
Internal Medicine
Rehab Medicine
Orthopedics
Internal Medicine

## 2023-05-17 NOTE — PROGRESS NOTE ADULT - SUBJECTIVE AND OBJECTIVE BOX
Ortho Note    Pt seen and examined on morning rounds. Pt complaining of L hip soreness this morning. Didn't mobilize with PT yesterday because they came during lunch time. Encouraged frequent oob with PT  Denies CP, SOB, N/V, numbness/tingling     Vital Signs Last 24 Hrs  T(C): 36.7 (05-17-23 @ 05:37), Max: 36.7 (05-17-23 @ 05:37)  T(F): 98.1 (05-17-23 @ 05:37), Max: 98.1 (05-17-23 @ 05:37)  HR: 88 (05-17-23 @ 05:37) (88 - 88)  BP: 148/73 (05-17-23 @ 05:37) (148/73 - 148/73)  BP(mean): --  RR: 18 (05-17-23 @ 05:37) (18 - 18)  SpO2: 97% (05-17-23 @ 05:37) (97% - 97%)  I&O's Summary      Physical Exam:  LLE  Skin intact no open injuries; TTP to right hip/groin  Unable to SLR or flex/ext at knee 2/2 pain, PF/DF intact, toes mobile  EHL/TA/GS intact to motor 5/5  SILT Saph/sofie/sp/dp/tib  Palpable DP & PT  Brisk cap refill distally                A/P: 88yFemale with [place] B  periprosthetic hip fracture.  -No acute orthopedic surgical intervention at this time  -Recommend PT/OT   -Toe touch weight bearing LLE 4-6 weeks  -Pain rx per primary  -DVT ppx per primary  -Follow up with Dr. Stewart outpatient  - continue frequent OOB with PT    Adalberto Matt, PGY-2  Ortho Pager [phone]

## 2023-05-17 NOTE — PROGRESS NOTE ADULT - PROBLEM SELECTOR PROBLEM 1
Fracture, proximal femur

## 2023-05-17 NOTE — PROGRESS NOTE ADULT - PROBLEM SELECTOR PLAN 1
- patient sustained left periprosthetic fracture of the proximal left femur seen on CT scan after fall   - ortho consulted: no acute surgical intervention     Plan   - pain control: tylenol mild pain, toradol moderate (using sparingly), oxycodone severe pain   - cont. gabapenin 100mg TID and flexeril 5mg TID.   - PT recs EMELY  - lidocaine patch   - ICP/OOB as tolerated
- patient sustained left periprosthetic fracture of the proximal left femur seen on CT scan after fall   - ortho consulted: no acute surgical intervention     Plan   - pain control: tylenol mild pain, ibuprofen 400mg for moderate pain, oxycodone severe pain   - cont. gabapenin 100mg TID and flexeril 5mg TID.   - d/c toradol today and decrease to ibuprofen as above.   - PT recs EMELY  - lidocaine patch   - ICP/OOB as tolerated
- patient sustained left periprosthetic fracture of the proximal left femur seen on CT scan after fall   - ortho consulted: no acute surgical intervention     Plan   - pain control: tylenol mild pain, toradol moderate (using sparingly), oxycodone severe pain   - gabapenin 100mg TID and flexeril 5mg TID.   - PT/OT  - lidocaine patch   - ICP/OOB as tolerated
- patient sustained left periprosthetic fracture of the proximal left femur seen on CT scan after fall   - ortho consulted: no acute surgical intervention     Plan   - pain control: tylenol mild pain, ibuprofen 400mg for moderate pain, oxycodone severe pain   - cont. gabapenin 100mg TID and flexeril 5mg TID.   - d/c toradol today and decrease to ibuprofen as above.   - PT recs EMELY  - lidocaine patch   - ICP/OOB as tolerated
- patient sustained left periprosthetic fracture of the proximal left femur seen on CT scan after fall   - ortho consulted: no acute surgical intervention     Plan   - pain control: tylenol mild pain, toradol moderate (using sparingly), oxycodone severe pain   - Add gabapenin 100mg TID and flexeril 5mg TID.   - PT/OT  - lidocaine patch   - ICP/OOB as tolerated

## 2023-05-17 NOTE — PROGRESS NOTE ADULT - PROBLEM SELECTOR PLAN 5
F: none  E: replete as needed  N: consistent carb   DVT: lovenox     Dispo: pending PT
F: none  E: replete as needed  N: consistent carb   DVT: lovenox     Dispo: pending EMELY
F: none  E: replete as needed  N: consistent carb   DVT: lovenox     Dispo: pending PT
F: none  E: replete as needed  N: consistent carb   DVT: lovenox     Dispo: pending EMELY
F: none  E: replete as needed  N: consistent carb   DVT: lovenox     Dispo: pending EMELY

## 2023-05-17 NOTE — PROGRESS NOTE ADULT - SUBJECTIVE AND OBJECTIVE BOX
Physical Medicine and Rehabilitation Progress Note :       Patient is a 88y old  Female who presents with a chief complaint of fall, left hip fracture (17 May 2023 07:20)      HPI:  87 y/o M F with PMH of DM, HTN, lumbar compression fracture, and b/l hip replacements presenting with left hip pain after a fall. Patient states she was going into the lobby of her building and the door was heavy and it swung back and hit her which caused her to fall backwards on her left side. She since then has had severe left hip pain. She denies LOC, head trauma, or chest pain/palpitations at the time of the event. She has hx of mechanical falls, she has previous hip fracture with prosthesis in place. She also has hx of L2 compression fracture hx along with osteonecrosis and was due to have a cortisone injection outpatient but did not make it to her appointment.     At this time she denies HA, dizziness, neck pain, chest pain, dyspnea, abd pain, N/V/D, Reports left hip pain, no pain on right hip or other joint pain.     Patient admitted for left periprosthetic fracture of the proximal left femur.     (12 May 2023 12:46)                Vital Signs Last 24 Hrs  T(C): 36.7 (17 May 2023 09:37), Max: 36.8 (16 May 2023 15:29)  T(F): 98 (17 May 2023 09:37), Max: 98.3 (16 May 2023 15:29)  HR: 90 (17 May 2023 09:37) (88 - 104)  BP: 115/66 (17 May 2023 09:37) (115/66 - 156/68)  BP(mean): --  RR: 18 (17 May 2023 09:37) (18 - 18)  SpO2: 95% (17 May 2023 09:37) (95% - 99%)    Parameters below as of 17 May 2023 09:37  Patient On (Oxygen Delivery Method): room air        MEDICATIONS  (STANDING):  cyclobenzaprine 5 milliGRAM(s) Oral three times a day  dextrose 5%. 1000 milliLiter(s) (50 mL/Hr) IV Continuous <Continuous>  dextrose 5%. 1000 milliLiter(s) (100 mL/Hr) IV Continuous <Continuous>  dextrose 50% Injectable 12.5 Gram(s) IV Push once  dextrose 50% Injectable 25 Gram(s) IV Push once  dextrose 50% Injectable 25 Gram(s) IV Push once  enoxaparin Injectable 40 milliGRAM(s) SubCutaneous every 24 hours  gabapentin 100 milliGRAM(s) Oral three times a day  glucagon  Injectable 1 milliGRAM(s) IntraMuscular once  insulin lispro (ADMELOG) corrective regimen sliding scale   SubCutaneous three times a day before meals  lidocaine   4% Patch 1 Patch Transdermal every 24 hours  pantoprazole    Tablet 40 milliGRAM(s) Oral before breakfast    MEDICATIONS  (PRN):  acetaminophen     Tablet .. 650 milliGRAM(s) Oral every 6 hours PRN Temp greater or equal to 38C (100.4F), Mild Pain (1 - 3)  dextrose Oral Gel 15 Gram(s) Oral once PRN Blood Glucose LESS THAN 70 milliGRAM(s)/deciliter  ibuprofen  Tablet. 400 milliGRAM(s) Oral every 6 hours PRN Moderate Pain (4 - 6)  oxyCODONE    IR 5 milliGRAM(s) Oral every 6 hours PRN Severe Pain (7 - 10)       Functional Status Assessment :   5/16/2023      Pain Assessment/Number Scale (0-10) Adult  Presence of Pain: complains of pain/discomfort  Body Location: LLE  Pain Rating (0-10): Rest: 5 (moderate pain)  Pain Rating (0-10): Activity: 6 (moderate pain)    Safety      AM-PAC Functional Assessment: Daily Activity  Type of Assessment: Daily assessment  Putting on and taking off regular lower body clothing?: 2 = A lot of assistance  Bathing (including washing, rinsing, drying)?: 2 = A lot of assistance  Toileting, which includes using toilet, bedpan or urinal?: 2 = A lot of assistance  Putting on and taking off regular upper body clothing?: 2 = A lot of assistance  Take care of personal grooming such as brushing teeth?: 3 = A little assistance  Eating meals?: 4 = No assist / stand by assistance  Score: 15   Row Comment: Ask the patient "How much help from another person do you currently need? (If the patient hasn't done an activity recently, how much help from another person do you think he/she needs if he/she tried?)    Cognitive/Neuro      Cognitive/Neuro/Behavioral  Cognitive/Neuro/Behavioral [WDL Definition: Alert; opens eyes spontaneously; arouses to voice or touch; oriented x 4; follows commands; speech spontaneous, logical; purposeful motor response; behavior appropriate to situation]: WDL    Language Assistance  Preferred Language to Address Healthcare Preferred Language to Address Healthcare: English    Therapeutic Interventions      Sit-Stand Transfer Training  Transfer Training Sit-to-Stand Transfer: moderate assist (50% patient effort);  2 person assist;  verbal cues;  toe touch weight-bearing   LLE   rolling walker  Transfer Training Stand-to-Sit Transfer: minimum assist (75% patient effort);  1 person assist;  verbal cues;  toe touch weight-bearing   LLE   rolling walker  Sit-to-Stand Transfer Training Transfer Safety Analysis: decreased balance;  decreased weight-shifting ability;  inability to maintain weight-bearing restrictions w/o assist;  cognitive, decreased safety awareness;  decreased strength;  impaired balance    Therapeutic Exercise  Therapeutic Exercise Detail: patient engaged in stand pivot transfer from bed<-> commode with Max Ax2 and RW, decreased command following noted- attempts to perform herself     Lower Body Dressing Training  Lower Body Dressing Training Charges: don b/l socks   Lower Body Dressing Training Assistance: maximum assist (25% patient effort);  1 person assist;  impaired balance;  decreased strength;  pain    Upper Body Dressing Training  Upper Body Dressing Training Charges: don gown   Upper Body Dressing Training Assistance: moderate assist (50% patient effort);  1 person assist;  verbal cues;  decreased strength;  impaired balance    Toilet Training  Toilet Training Charges: pericare and clothing management   Toilet Training Assistance: moderate assist (50% patient effort);  1 person assist;  verbal cues;  decreased strength;  impaired balance;  pain;  impaired postural control        PM&R Impression : as above    Current Disposition Plan Recommendations :    subacute rehab placement

## 2023-05-17 NOTE — PROGRESS NOTE ADULT - NUTRITIONAL ASSESSMENT
This patient has been assessed with a concern for Malnutrition and has been determined to have a diagnosis/diagnoses of Severe protein-calorie malnutrition.    This patient is being managed with:   Diet Consistent Carbohydrate w/Evening Snack-  Supplement Feeding Modality:  Oral  Glucerna Shake Cans or Servings Per Day:  2       Frequency:  Daily  Entered: May 13 2023 11:32AM  

## 2023-05-17 NOTE — PROGRESS NOTE ADULT - PROBLEM SELECTOR PLAN 2
Resolved.  - Leukocytosis on admission with neutrophilic predominance, likely reactive after fall and fracture   - UA without evidence of infection- no reported infectious symptoms per patient  - afebrile, no cough, no dysuria, CXR without consolidation or infiltrate  - continue to monitor/trend cbc
Resolved.
Resolved.  - Leukocytosis on admission with neutrophilic predominance, likely reactive after fall and fracture   - UA without evidence of infection- no reported infectious symptoms per patient  - afebrile, no cough, no dysuria, CXR without consolidation or infiltrate  - continue to monitor/trend cbc

## 2023-05-17 NOTE — PROGRESS NOTE ADULT - SUBJECTIVE AND OBJECTIVE BOX
CRYSTAL MONAE 88y Female  MRN#: [id number]   CODE STATUS: FULL      SUBJECTIVE  Patient is a 88y old Female who presents with a chief complaint of fall, left hip fracture (16 May 2023 12:30)  Currently admitted to medicine with the primary diagnosis of Fracture, proximal femur    Today is hospital day 4d, Pt seen at bedside     Present Today:           Horne Catheter (x)No/ ()Yes? Indication:          Central Line (x)No/ ()Yes? Indication:          IV Fluids (x)No/ ()Yes? Type:  Rate:  Indication:      OBJECTIVE  PAST MEDICAL & SURGICAL HISTORY  Diabetes    Hypertension    History of bilateral hip arthroplasty      Home Meds:  acetaminophen 325 mg oral tablet: 2 tab(s) orally every 6 hours  bisacodyl 10 mg rectal suppository: 1 suppository(ies) rectal once a day, As needed, If no bowel movement  coloplast barrier cream: Apply topically to affected area 1 to 3 times a day     Disp: 2 standard tubes  cyclobenzaprine 5 mg oral tablet: 1 tab(s) orally 3 times a day as needed for  muscle spasm  gabapentin 100 mg oral capsule: 1 cap(s) orally 3 times a day  ketorolac 10 mg oral tablet: 1 tab(s) orally every 8 hours as needed for Moderate Pain (4 - 6)  Lidocare Pain Relief Patch 4% topical film: Apply topically to affected area once a day as needed for left hip pain  metFORMIN 1000 mg oral tablet: 1 tab(s) orally 2 times a day  oxyCODONE 5 mg oral tablet: 1 tab(s) orally every 6 hours as needed for , Severe Pain (7 - 10)  pantoprazole 40 mg oral delayed release tablet: 1 tab(s) orally once a day (before a meal)    ALLERGIES:  No Known Allergies    MEDICATIONS:  STANDING MEDICATIONS  cyclobenzaprine 5 milliGRAM(s) Oral three times a day  dextrose 5%. 1000 milliLiter(s) IV Continuous <Continuous>  dextrose 5%. 1000 milliLiter(s) IV Continuous <Continuous>  dextrose 50% Injectable 12.5 Gram(s) IV Push once  dextrose 50% Injectable 25 Gram(s) IV Push once  dextrose 50% Injectable 25 Gram(s) IV Push once  enoxaparin Injectable 40 milliGRAM(s) SubCutaneous every 24 hours  gabapentin 100 milliGRAM(s) Oral three times a day  glucagon  Injectable 1 milliGRAM(s) IntraMuscular once  insulin lispro (ADMELOG) corrective regimen sliding scale   SubCutaneous three times a day before meals  lidocaine   4% Patch 1 Patch Transdermal every 24 hours  pantoprazole    Tablet 40 milliGRAM(s) Oral before breakfast    PRN MEDICATIONS  acetaminophen     Tablet .. 650 milliGRAM(s) Oral every 6 hours PRN  dextrose Oral Gel 15 Gram(s) Oral once PRN  ibuprofen  Tablet. 400 milliGRAM(s) Oral every 6 hours PRN  oxyCODONE    IR 5 milliGRAM(s) Oral every 6 hours PRN      VITAL SIGNS: Last 24 Hours  T(C): 36.3 (16 May 2023 10:50), Max: 36.3 (15 May 2023 15:20)  T(F): 97.4 (16 May 2023 10:50), Max: 97.4 (15 May 2023 15:20)  HR: 85 (16 May 2023 10:50) (72 - 85)  BP: 147/63 (16 May 2023 10:50) (143/64 - 159/71)  BP(mean): --  RR: 18 (16 May 2023 10:50) (18 - 19)  SpO2: 98% (16 May 2023 10:50) (95% - 99%)    LABS:                        RADIOLOGY:      PHYSICAL EXAM:  General: NAD, AAOx3  HEENT: atraumatic, normocephalic  Pulmonary: clear to auscultation bilaterally; No wheeze  Cardiovascular: Regular rate and rhythm; no murmurs, rubs or gallops. Normal S1S2  Gastrointestinal: Soft, nontender, nondistended; bowel sounds present  Musculoskeletal: 2+ peripheral pulses, no clubbing, cyanosis or edema, ecchymosis over left hip and medial groin. Stable from prior  Neurology: Pt. alert and oriented, fluent speech, able to move all extremities  Skin: no rashes or lesions   CRYSTAL MONAE 88y Female  MRN#: [id number]   CODE STATUS: FULL      SUBJECTIVE  Patient is a 88y old Female who presents with a chief complaint of fall, left hip fracture (16 May 2023 12:30)  Currently admitted to medicine with the primary diagnosis of Fracture, proximal femur     Pt seen at bedside, states pain continues to improve, experienced some throbbing leg pain that was ameliorated with pain regimen, able to ambulate to the commode without issue. ROS otherwise negative     Present Today:           Horne Catheter (x)No/ ()Yes? Indication:          Central Line (x)No/ ()Yes? Indication:          IV Fluids (x)No/ ()Yes? Type:  Rate:  Indication:      OBJECTIVE  PAST MEDICAL & SURGICAL HISTORY  Diabetes    Hypertension    History of bilateral hip arthroplasty      Home Meds:  acetaminophen 325 mg oral tablet: 2 tab(s) orally every 6 hours  bisacodyl 10 mg rectal suppository: 1 suppository(ies) rectal once a day, As needed, If no bowel movement  coloplast barrier cream: Apply topically to affected area 1 to 3 times a day     Disp: 2 standard tubes  cyclobenzaprine 5 mg oral tablet: 1 tab(s) orally 3 times a day as needed for  muscle spasm  gabapentin 100 mg oral capsule: 1 cap(s) orally 3 times a day  ketorolac 10 mg oral tablet: 1 tab(s) orally every 8 hours as needed for Moderate Pain (4 - 6)  Lidocare Pain Relief Patch 4% topical film: Apply topically to affected area once a day as needed for left hip pain  metFORMIN 1000 mg oral tablet: 1 tab(s) orally 2 times a day  oxyCODONE 5 mg oral tablet: 1 tab(s) orally every 6 hours as needed for , Severe Pain (7 - 10)  pantoprazole 40 mg oral delayed release tablet: 1 tab(s) orally once a day (before a meal)    ALLERGIES:  No Known Allergies    MEDICATIONS:  STANDING MEDICATIONS  cyclobenzaprine 5 milliGRAM(s) Oral three times a day  dextrose 5%. 1000 milliLiter(s) IV Continuous <Continuous>  dextrose 5%. 1000 milliLiter(s) IV Continuous <Continuous>  dextrose 50% Injectable 12.5 Gram(s) IV Push once  dextrose 50% Injectable 25 Gram(s) IV Push once  dextrose 50% Injectable 25 Gram(s) IV Push once  enoxaparin Injectable 40 milliGRAM(s) SubCutaneous every 24 hours  gabapentin 100 milliGRAM(s) Oral three times a day  glucagon  Injectable 1 milliGRAM(s) IntraMuscular once  insulin lispro (ADMELOG) corrective regimen sliding scale   SubCutaneous three times a day before meals  lidocaine   4% Patch 1 Patch Transdermal every 24 hours  pantoprazole    Tablet 40 milliGRAM(s) Oral before breakfast    PRN MEDICATIONS  acetaminophen     Tablet .. 650 milliGRAM(s) Oral every 6 hours PRN  dextrose Oral Gel 15 Gram(s) Oral once PRN  ibuprofen  Tablet. 400 milliGRAM(s) Oral every 6 hours PRN  oxyCODONE    IR 5 milliGRAM(s) Oral every 6 hours PRN      VITAL SIGNS: Last 24 Hours  T(C): 36.3 (16 May 2023 10:50), Max: 36.3 (15 May 2023 15:20)  T(F): 97.4 (16 May 2023 10:50), Max: 97.4 (15 May 2023 15:20)  HR: 85 (16 May 2023 10:50) (72 - 85)  BP: 147/63 (16 May 2023 10:50) (143/64 - 159/71)  BP(mean): --  RR: 18 (16 May 2023 10:50) (18 - 19)  SpO2: 98% (16 May 2023 10:50) (95% - 99%)    LABS:       RADIOLOGY:      PHYSICAL EXAM:  General: NAD, AAOx3  HEENT: atraumatic, normocephalic  Pulmonary: clear to auscultation bilaterally; No wheeze  Cardiovascular: Regular rate and rhythm; no murmurs, rubs or gallops. Normal S1S2  Gastrointestinal: Soft, nontender, nondistended; bowel sounds present  Musculoskeletal: 2+ peripheral pulses, no clubbing, cyanosis or edema, ecchymosis over left hip and medial groin. Stable from prior  Neurology: Pt. alert and oriented, fluent speech, able to move all extremities  Skin: no rashes or lesions

## 2023-05-17 NOTE — DISCHARGE NOTE NURSING/CASE MANAGEMENT/SOCIAL WORK - PATIENT PORTAL LINK FT
You can access the FollowMyHealth Patient Portal offered by [facility] by registering at the following website: [email or web address]. By joining [facility] FollowMyHealth portal, you will also be able to view your health information using other applications (apps) compatible with our system.

## 2023-05-17 NOTE — PROGRESS NOTE ADULT - ASSESSMENT
IM    88 y o M F with PMH of DM, HTN, lumbar compression fracture, and b/l hip replacements presenting with left hip pain after a fall, found to have left periprosthetic fracture of the proximal left femur.       Nutritional Assessment:  · Nutritional Assessment	This patient has been assessed with a concern for Malnutrition and has been determined to have a diagnosis/diagnoses of Severe protein-calorie malnutrition.    This patient is being managed with:   Diet Consistent Carbohydrate w/Evening Snack-  Supplement Feeding Modality:  Oral  Glucerna Shake Cans or Servings Per Day:  2       Frequency:  Daily  Entered: May 13 2023 11:32AM    Problem/Plan - 1:  ·  Problem: Fracture, proximal femur.   ·  Plan: - patient sustained left periprosthetic fracture of the proximal left femur seen on CT scan after fall   - ortho consulted: no acute surgical intervention     Plan   - pain control: tylenol mild pain, ibuprofen 400mg for moderate pain, oxycodone severe pain   - cont. gabapenin 100mg TID and flexeril 5mg TID.   - d/c toradol today and decrease to ibuprofen as above.   - PT recs [facility]  - lidocaine patch   - ICP/OOB as tolerated.    Problem/Plan - 2:  ·  Problem: Leukocytosis.   ·  Plan: Resolved.  - Leukocytosis on admission with neutrophilic predominance, likely reactive after fall and fracture   - UA without evidence of infection- no reported infectious symptoms per patient  - afebrile, no cough, no dysuria, CXR without consolidation or infiltrate  - continue to monitor/trend cbc.    Problem/Plan - 3:  ·  Problem: Type 2 diabetes mellitus.   ·  Plan: - on metformin at home, continue with moderate sliding scale  - monitor FSG, goal 150-180, adjust insulin regimen as needed.    Problem/Plan - 4:  ·  Problem: HTN (hypertension).   ·  Plan: - hx of HTN, however not on any medications   - believe elevated BP likely 2/2 pain and anxiety   - continue to monitor BP and pain control as above   - if consistently elevated and pain is controlled , can consider starting BP med.    Problem/Plan - 5:  ·  Problem: Preventive measure.   ·  Plan: F: none  E: replete as needed  N: consistent carb   DVT: lovenox     Dispo: pending [facility].    
87 y/o M F with PMH of DM, HTN, lumbar compression fracture, and b/l hip replacements presenting with left hip pain after a fall, found to have left periprosthetic fracture of the proximal left femur. 

## 2023-05-23 DIAGNOSIS — S72.102A UNSPECIFIED TROCHANTERIC FRACTURE OF LEFT FEMUR, INITIAL ENCOUNTER FOR CLOSED FRACTURE: ICD-10-CM
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
## 2023-05-23 DIAGNOSIS — Z96.643 PRESENCE OF ARTIFICIAL HIP JOINT, BILATERAL: ICD-10-CM

## 2023-05-23 DIAGNOSIS — I10 ESSENTIAL (PRIMARY) HYPERTENSION: ICD-10-CM

## 2023-05-23 DIAGNOSIS — E43 UNSPECIFIED SEVERE PROTEIN-CALORIE MALNUTRITION: ICD-10-CM

## 2023-05-23 DIAGNOSIS — E11.9 TYPE 2 DIABETES MELLITUS WITHOUT COMPLICATIONS: ICD-10-CM
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
## 2023-06-09 NOTE — ED ADULT NURSE NOTE - NS ED NURSE LEVEL OF CONSCIOUSNESS SPEECH
MICHI Sahu Speaking Coherently

## 2023-06-15 ENCOUNTER — RESULT REVIEW (OUTPATIENT)
Age: 88
End: 2023-06-15

## 2023-06-15 ENCOUNTER — OUTPATIENT (OUTPATIENT)
Dept: OUTPATIENT SERVICES | Facility: HOSPITAL | Age: 88
LOS: 1 days | End: 2023-06-15
Payer: MEDICARE

## 2023-06-15 ENCOUNTER — APPOINTMENT (OUTPATIENT)
Dept: ORTHOPEDIC SURGERY | Facility: CLINIC | Age: 88
End: 2023-06-15
Payer: MEDICARE

## 2023-06-15 VITALS
HEART RATE: 70 BPM | BODY MASS INDEX: 22.19 KG/M2 | OXYGEN SATURATION: 99 % | HEIGHT: 60 IN | DIASTOLIC BLOOD PRESSURE: 78 MMHG | SYSTOLIC BLOOD PRESSURE: 166 MMHG | WEIGHT: 113 LBS

## 2023-06-15 DIAGNOSIS — S72.115G: ICD-10-CM

## 2023-06-15 DIAGNOSIS — S72.143A DISPLACED INTERTROCHANTERIC FRACTURE OF UNSPECIFIED FEMUR, INITIAL ENCOUNTER FOR CLOSED FRACTURE: ICD-10-CM

## 2023-06-15 DIAGNOSIS — Z96.643 PRESENCE OF ARTIFICIAL HIP JOINT, BILATERAL: Chronic | ICD-10-CM

## 2023-06-15 PROCEDURE — 73590 X-RAY EXAM OF LOWER LEG: CPT

## 2023-06-15 PROCEDURE — 99213 OFFICE O/P EST LOW 20 MIN: CPT

## 2023-06-15 PROCEDURE — 73590 X-RAY EXAM OF LOWER LEG: CPT | Mod: 26,LT

## 2023-06-15 PROCEDURE — 73502 X-RAY EXAM HIP UNI 2-3 VIEWS: CPT

## 2023-06-15 PROCEDURE — 73502 X-RAY EXAM HIP UNI 2-3 VIEWS: CPT | Mod: 26,LT

## 2023-06-18 PROBLEM — S72.143A: Status: ACTIVE | Noted: 2020-06-26

## 2023-06-18 NOTE — DISCUSSION/SUMMARY
[de-identified] : All medical record entries made by "residents name" acting as a scribe for this encounter under the direction of "Dr. Szymanski." I have reviewed the chart and agree that the record accurately reflects my personal performance of the history, physical exam, assessment and plan. I have also personally directed, reviewed and agreed with the chart.\par

## 2023-06-18 NOTE — ASSESSMENT
[FreeTextEntry1] : 88F s/p fall w/L Hip Nondisplaced [place] B1 PP Hip Fx treated nonoperatively TTWB however has been WBAT at rehab\par - D/w patient of complaince with therapists and TTWB status, otherwise risk displacement and indication for surgical fixation\par - Given pain w/ambulation, will recommend another 4 weeks of TTWB\par - Please f/u in clinic in 4 weeks\par - Pain control\par - Ambulate w/walker assistance\par

## 2023-07-13 ENCOUNTER — APPOINTMENT (OUTPATIENT)
Dept: ORTHOPEDIC SURGERY | Facility: CLINIC | Age: 88
End: 2023-07-13
Payer: MEDICARE

## 2023-07-13 PROCEDURE — 73502 X-RAY EXAM HIP UNI 2-3 VIEWS: CPT | Mod: 26

## 2023-07-13 PROCEDURE — 99212 OFFICE O/P EST SF 10 MIN: CPT

## 2023-07-13 NOTE — DISCUSSION/SUMMARY
[de-identified] : \par \par 88F s/p fall w/L Hip Nondisplaced [place] B1 PP Hip Fx treated nonoperatively has been WBAT without issues. \par - WBAT\par - Please f/u in clinic in 4 weeks\par - Pain control\par - Ambulate w/walker assistance\par \par \par \par All medical record entries made by Amado Zhang acting as a scribe for this encounter under the direction of "Dr. Szymanski." I have reviewed the chart and agree that the record accurately reflects my personal performance of the history, physical exam, assessment and plan. I have also personally directed, reviewed and agreed with the chart.

## 2023-07-13 NOTE — HISTORY OF PRESENT ILLNESS
[de-identified] : 88F presenting for follow up of periprosthetic hip fx\par doing well. No intervalk medical issues. \par now back home from rehab with medical attendant \par has been able to get around much better.

## 2023-07-13 NOTE — PHYSICAL EXAM
[de-identified] : General: AAOx3, NAD\par LLE: Mild/MOderate pain w/ROM of L hip/femur\par Minimal point tenderness over L proximal femur\par No pain w/ROM of knee\par NVID\par Medium sized bruise over L Distal tibia without skin breakdown  [de-identified] : interval healing of olecranon fracture

## 2023-07-26 ENCOUNTER — APPOINTMENT (OUTPATIENT)
Dept: INTERNAL MEDICINE | Facility: CLINIC | Age: 88
End: 2023-07-26
Payer: MEDICARE

## 2023-07-26 VITALS
HEART RATE: 92 BPM | SYSTOLIC BLOOD PRESSURE: 139 MMHG | TEMPERATURE: 95.2 F | HEIGHT: 60 IN | OXYGEN SATURATION: 97 % | DIASTOLIC BLOOD PRESSURE: 62 MMHG

## 2023-07-26 DIAGNOSIS — Z99.3 DEPENDENCE ON WHEELCHAIR: ICD-10-CM

## 2023-07-26 DIAGNOSIS — Z96.649 PERIPROSTHETIC FRACTURE AROUND OTHER INTERNAL PROSTHETIC JOINT, INITIAL ENCOUNTER: ICD-10-CM

## 2023-07-26 DIAGNOSIS — M97.8XXA PERIPROSTHETIC FRACTURE AROUND OTHER INTERNAL PROSTHETIC JOINT, INITIAL ENCOUNTER: ICD-10-CM

## 2023-07-26 PROCEDURE — 99214 OFFICE O/P EST MOD 30 MIN: CPT

## 2023-07-26 RX ORDER — SULFAMETHOXAZOLE AND TRIMETHOPRIM 800; 160 MG/1; MG/1
800-160 TABLET ORAL TWICE DAILY
Qty: 6 | Refills: 1 | Status: DISCONTINUED | COMMUNITY
Start: 2021-10-12 | End: 2023-07-26

## 2023-07-26 RX ORDER — METFORMIN HYDROCHLORIDE 1000 MG/1
1000 TABLET, COATED ORAL
Qty: 180 | Refills: 3 | Status: DISCONTINUED | COMMUNITY
Start: 2021-11-29 | End: 2023-07-26

## 2023-07-26 RX ORDER — NITROFURANTOIN (MONOHYDRATE/MACROCRYSTALS) 25; 75 MG/1; MG/1
100 CAPSULE ORAL
Qty: 10 | Refills: 0 | Status: DISCONTINUED | COMMUNITY
Start: 2022-08-24 | End: 2023-07-26

## 2023-07-26 NOTE — PHYSICAL EXAM
[Normal] : affect was normal and insight and judgment were intact [de-identified] : Bilateral lumbar paraspinal muscle tenderness [de-identified] : Marked limitation of passive as well as active range of motion in left hip (including both flexion and external rotation) due to pain. [de-identified] : Antalgic gait due to left hip pain

## 2023-08-09 ENCOUNTER — NON-APPOINTMENT (OUTPATIENT)
Age: 88
End: 2023-08-09

## 2023-09-14 ENCOUNTER — RESULT REVIEW (OUTPATIENT)
Age: 88
End: 2023-09-14

## 2023-09-14 ENCOUNTER — OUTPATIENT (OUTPATIENT)
Dept: OUTPATIENT SERVICES | Facility: HOSPITAL | Age: 88
LOS: 1 days | End: 2023-09-14
Payer: MEDICARE

## 2023-09-14 ENCOUNTER — APPOINTMENT (OUTPATIENT)
Dept: ORTHOPEDIC SURGERY | Facility: CLINIC | Age: 88
End: 2023-09-14

## 2023-09-14 DIAGNOSIS — Z96.643 PRESENCE OF ARTIFICIAL HIP JOINT, BILATERAL: Chronic | ICD-10-CM

## 2023-09-14 PROCEDURE — 73502 X-RAY EXAM HIP UNI 2-3 VIEWS: CPT | Mod: 26,LT

## 2023-09-14 PROCEDURE — 73502 X-RAY EXAM HIP UNI 2-3 VIEWS: CPT

## 2023-09-15 DIAGNOSIS — G82.20 PARAPLEGIA, UNSPECIFIED: ICD-10-CM

## 2023-09-15 DIAGNOSIS — M54.17 SPINAL STENOSIS, LUMBOSACRAL REGION: ICD-10-CM

## 2023-09-15 DIAGNOSIS — M48.07 SPINAL STENOSIS, LUMBOSACRAL REGION: ICD-10-CM

## 2023-10-26 NOTE — ED ADULT NURSE NOTE - NS ED NURSE RECORD ANOTHER VITAL SIGN
LIZ to:    Sister Jnenifer: [phone]  Mom Katy  ; [phone]    Writer provided updates with plan of care recommendations.  They asked numerous questions of which Writer was able to answer.  They are aware of the LIZ and asked for copies of which Writer did not provided. (As those would have to go to the Pt).  They expressed concern that 2 copies had to be filled out as one set was accidentally thrown away.  Pt expressed paranoia over this to family.  They additionally requested that the Pt's wedding ring go home with them per Pt request.  Writer informed RN.   Yes

## 2023-11-08 ENCOUNTER — APPOINTMENT (OUTPATIENT)
Dept: INTERNAL MEDICINE | Facility: CLINIC | Age: 88
End: 2023-11-08

## 2023-11-09 RX ORDER — METFORMIN HYDROCHLORIDE 1000 MG/1
1000 TABLET, EXTENDED RELEASE ORAL DAILY
Qty: 90 | Refills: 3 | Status: ACTIVE | COMMUNITY
Start: 2023-03-23 | End: 1900-01-01

## 2023-11-21 ENCOUNTER — APPOINTMENT (OUTPATIENT)
Dept: INTERNAL MEDICINE | Facility: CLINIC | Age: 88
End: 2023-11-21
Payer: MEDICARE

## 2023-11-21 ENCOUNTER — LABORATORY RESULT (OUTPATIENT)
Age: 88
End: 2023-11-21

## 2023-11-21 VITALS
BODY MASS INDEX: 23.16 KG/M2 | WEIGHT: 118 LBS | HEIGHT: 60 IN | TEMPERATURE: 97.3 F | HEART RATE: 85 BPM | DIASTOLIC BLOOD PRESSURE: 75 MMHG | SYSTOLIC BLOOD PRESSURE: 174 MMHG | OXYGEN SATURATION: 99 %

## 2023-11-21 DIAGNOSIS — M48.07 SPINAL STENOSIS, LUMBOSACRAL REGION: ICD-10-CM

## 2023-11-21 DIAGNOSIS — M54.50 LOW BACK PAIN, UNSPECIFIED: ICD-10-CM

## 2023-11-21 DIAGNOSIS — I10 ESSENTIAL (PRIMARY) HYPERTENSION: ICD-10-CM

## 2023-11-21 DIAGNOSIS — M25.552 PAIN IN LEFT HIP: ICD-10-CM

## 2023-11-21 DIAGNOSIS — E11.9 TYPE 2 DIABETES MELLITUS W/OUT COMPLICATIONS: ICD-10-CM

## 2023-11-21 DIAGNOSIS — Z00.00 ENCOUNTER FOR GENERAL ADULT MEDICAL EXAMINATION W/OUT ABNORMAL FINDINGS: ICD-10-CM

## 2023-11-21 DIAGNOSIS — R26.89 OTHER ABNORMALITIES OF GAIT AND MOBILITY: ICD-10-CM

## 2023-11-21 DIAGNOSIS — G89.29 LOW BACK PAIN, UNSPECIFIED: ICD-10-CM

## 2023-11-21 DIAGNOSIS — S72.141A DISPLACED INTERTROCHANTERIC FRACTURE OF RIGHT FEMUR, INITIAL ENCOUNTER FOR CLOSED FRACTURE: ICD-10-CM

## 2023-11-21 DIAGNOSIS — M81.0 AGE-RELATED OSTEOPOROSIS W/OUT CURRENT PATHOLOGICAL FRACTURE: ICD-10-CM

## 2023-11-21 DIAGNOSIS — E55.9 VITAMIN D DEFICIENCY, UNSPECIFIED: ICD-10-CM

## 2023-11-21 PROCEDURE — 36415 COLL VENOUS BLD VENIPUNCTURE: CPT

## 2023-11-21 PROCEDURE — G0439: CPT

## 2023-11-21 PROCEDURE — 99213 OFFICE O/P EST LOW 20 MIN: CPT | Mod: 25

## 2023-11-21 RX ORDER — LOSARTAN POTASSIUM 50 MG/1
50 TABLET, FILM COATED ORAL DAILY
Qty: 90 | Refills: 3 | Status: ACTIVE | COMMUNITY
Start: 2023-11-21 | End: 1900-01-01

## 2023-11-21 RX ORDER — CLOTRIMAZOLE AND BETAMETHASONE DIPROPIONATE 10; .5 MG/G; MG/G
1-0.05 CREAM TOPICAL TWICE DAILY
Qty: 1 | Refills: 1 | Status: DISCONTINUED | COMMUNITY
Start: 2020-07-22 | End: 2023-11-21

## 2023-11-21 RX ORDER — CAMPHOR 0.45 %
25 GEL (GRAM) TOPICAL
Qty: 1 | Refills: 1 | Status: DISCONTINUED | COMMUNITY
Start: 2021-02-26 | End: 2023-11-21

## 2023-11-22 LAB
25(OH)D3 SERPL-MCNC: 32.2 NG/ML
ALBUMIN SERPL ELPH-MCNC: 4.6 G/DL
ALP BLD-CCNC: 107 U/L
ALT SERPL-CCNC: 5 U/L
ANION GAP SERPL CALC-SCNC: 14 MMOL/L
APPEARANCE: CLEAR
AST SERPL-CCNC: 11 U/L
BASOPHILS # BLD AUTO: 0.05 K/UL
BASOPHILS NFR BLD AUTO: 0.7 %
BILIRUB SERPL-MCNC: 0.3 MG/DL
BILIRUBIN URINE: NEGATIVE
BLOOD URINE: NEGATIVE
BUN SERPL-MCNC: 18 MG/DL
CALCIUM SERPL-MCNC: 9.4 MG/DL
CHLORIDE SERPL-SCNC: 101 MMOL/L
CO2 SERPL-SCNC: 26 MMOL/L
COLOR: YELLOW
CREAT SERPL-MCNC: 0.79 MG/DL
EGFR: 73 ML/MIN/1.73M2
EOSINOPHIL # BLD AUTO: 0.22 K/UL
EOSINOPHIL NFR BLD AUTO: 3.1 %
ESTIMATED AVERAGE GLUCOSE: 163 MG/DL
GLUCOSE QUALITATIVE U: NEGATIVE MG/DL
GLUCOSE SERPL-MCNC: 147 MG/DL
HBA1C MFR BLD HPLC: 7.3 %
HCT VFR BLD CALC: 35.9 %
HGB BLD-MCNC: 11.6 G/DL
IMM GRANULOCYTES NFR BLD AUTO: 0.3 %
KETONES URINE: NEGATIVE MG/DL
LEUKOCYTE ESTERASE URINE: ABNORMAL
LYMPHOCYTES # BLD AUTO: 1.8 K/UL
LYMPHOCYTES NFR BLD AUTO: 25.1 %
MAN DIFF?: NORMAL
MCHC RBC-ENTMCNC: 28.2 PG
MCHC RBC-ENTMCNC: 32.3 GM/DL
MCV RBC AUTO: 87.1 FL
MONOCYTES # BLD AUTO: 0.46 K/UL
MONOCYTES NFR BLD AUTO: 6.4 %
NEUTROPHILS # BLD AUTO: 4.61 K/UL
NEUTROPHILS NFR BLD AUTO: 64.4 %
NITRITE URINE: POSITIVE
PH URINE: 6
PLATELET # BLD AUTO: 287 K/UL
POTASSIUM SERPL-SCNC: 4.2 MMOL/L
PROT SERPL-MCNC: 7 G/DL
PROTEIN URINE: NORMAL MG/DL
RBC # BLD: 4.12 M/UL
RBC # FLD: 17.5 %
SODIUM SERPL-SCNC: 140 MMOL/L
SPECIFIC GRAVITY URINE: 1.01
UROBILINOGEN URINE: 0.2 MG/DL
WBC # FLD AUTO: 7.16 K/UL

## 2023-12-14 ENCOUNTER — APPOINTMENT (OUTPATIENT)
Dept: ORTHOPEDIC SURGERY | Facility: CLINIC | Age: 88
End: 2023-12-14

## 2024-01-04 ENCOUNTER — APPOINTMENT (OUTPATIENT)
Dept: ORTHOPEDIC SURGERY | Facility: CLINIC | Age: 89
End: 2024-01-04

## 2024-01-17 RX ORDER — HYDROXYZINE HYDROCHLORIDE 10 MG/1
10 TABLET ORAL AT BEDTIME
Qty: 30 | Refills: 2 | Status: DISCONTINUED | COMMUNITY
Start: 2021-02-22 | End: 2024-01-17

## 2024-01-17 RX ORDER — IBUPROFEN 400 MG/1
400 TABLET, FILM COATED ORAL EVERY 8 HOURS
Qty: 21 | Refills: 0 | Status: DISCONTINUED | COMMUNITY
Start: 2021-07-23 | End: 2024-01-17

## 2024-01-17 RX ORDER — CYCLOBENZAPRINE HYDROCHLORIDE 10 MG/1
10 TABLET, FILM COATED ORAL
Qty: 25 | Refills: 1 | Status: DISCONTINUED | COMMUNITY
Start: 2021-07-23 | End: 2024-01-17

## 2024-01-17 RX ORDER — MUPIROCIN 20 MG/G
2 OINTMENT TOPICAL
Qty: 1 | Refills: 3 | Status: DISCONTINUED | COMMUNITY
Start: 2021-01-14 | End: 2024-01-17

## 2024-01-30 VITALS
DIASTOLIC BLOOD PRESSURE: 75 MMHG | SYSTOLIC BLOOD PRESSURE: 208 MMHG | RESPIRATION RATE: 16 BRPM | HEART RATE: 125 BPM | TEMPERATURE: 97 F | OXYGEN SATURATION: 98 %

## 2024-01-30 LAB
ALBUMIN SERPL ELPH-MCNC: 3.8 G/DL — SIGNIFICANT CHANGE UP (ref 3.4–5)
ALP SERPL-CCNC: 111 U/L — SIGNIFICANT CHANGE UP (ref 40–120)
ALT FLD-CCNC: 16 U/L — SIGNIFICANT CHANGE UP (ref 12–42)
ANION GAP SERPL CALC-SCNC: 12 MMOL/L — SIGNIFICANT CHANGE UP (ref 9–16)
APTT BLD: 28.6 SEC — SIGNIFICANT CHANGE UP (ref 24.5–35.6)
AST SERPL-CCNC: 18 U/L — SIGNIFICANT CHANGE UP (ref 15–37)
BILIRUB SERPL-MCNC: 0.3 MG/DL — SIGNIFICANT CHANGE UP (ref 0.2–1.2)
BUN SERPL-MCNC: 22 MG/DL — SIGNIFICANT CHANGE UP (ref 7–23)
CALCIUM SERPL-MCNC: 9.2 MG/DL — SIGNIFICANT CHANGE UP (ref 8.5–10.5)
CHLORIDE SERPL-SCNC: 101 MMOL/L — SIGNIFICANT CHANGE UP (ref 96–108)
CO2 SERPL-SCNC: 26 MMOL/L — SIGNIFICANT CHANGE UP (ref 22–31)
CREAT SERPL-MCNC: 1.07 MG/DL — SIGNIFICANT CHANGE UP (ref 0.5–1.3)
EGFR: 51 ML/MIN/1.73M2 — LOW
GLUCOSE SERPL-MCNC: 177 MG/DL — HIGH (ref 70–99)
HCT VFR BLD CALC: 35.2 % — SIGNIFICANT CHANGE UP (ref 34.5–45)
HGB BLD-MCNC: 11.4 G/DL — LOW (ref 11.5–15.5)
INR BLD: 0.95 — SIGNIFICANT CHANGE UP (ref 0.85–1.18)
MAGNESIUM SERPL-MCNC: 1.8 MG/DL — SIGNIFICANT CHANGE UP (ref 1.6–2.6)
MCHC RBC-ENTMCNC: 27.7 PG — SIGNIFICANT CHANGE UP (ref 27–34)
MCHC RBC-ENTMCNC: 32.4 GM/DL — SIGNIFICANT CHANGE UP (ref 32–36)
MCV RBC AUTO: 85.4 FL — SIGNIFICANT CHANGE UP (ref 80–100)
NRBC # BLD: 0 /100 WBCS — SIGNIFICANT CHANGE UP (ref 0–0)
PLATELET # BLD AUTO: 302 K/UL — SIGNIFICANT CHANGE UP (ref 150–400)
POTASSIUM SERPL-MCNC: 3.9 MMOL/L — SIGNIFICANT CHANGE UP (ref 3.5–5.3)
POTASSIUM SERPL-SCNC: 3.9 MMOL/L — SIGNIFICANT CHANGE UP (ref 3.5–5.3)
PROT SERPL-MCNC: 7.6 G/DL — SIGNIFICANT CHANGE UP (ref 6.4–8.2)
PROTHROM AB SERPL-ACNC: 10.8 SEC — SIGNIFICANT CHANGE UP (ref 9.5–13)
RBC # BLD: 4.12 M/UL — SIGNIFICANT CHANGE UP (ref 3.8–5.2)
RBC # FLD: 16.4 % — HIGH (ref 10.3–14.5)
SODIUM SERPL-SCNC: 139 MMOL/L — SIGNIFICANT CHANGE UP (ref 132–145)
TROPONIN I, HIGH SENSITIVITY RESULT: 26.8 NG/L — SIGNIFICANT CHANGE UP
WBC # BLD: 13.17 K/UL — HIGH (ref 3.8–10.5)
WBC # FLD AUTO: 13.17 K/UL — HIGH (ref 3.8–10.5)

## 2024-01-30 PROCEDURE — 99285 EMERGENCY DEPT VISIT HI MDM: CPT

## 2024-01-30 PROCEDURE — 72170 X-RAY EXAM OF PELVIS: CPT | Mod: 26

## 2024-01-30 PROCEDURE — 71045 X-RAY EXAM CHEST 1 VIEW: CPT | Mod: 26

## 2024-01-30 RX ORDER — LABETALOL HCL 100 MG
10 TABLET ORAL ONCE
Refills: 0 | Status: COMPLETED | OUTPATIENT
Start: 2024-01-30 | End: 2024-01-30

## 2024-01-30 RX ORDER — NIFEDIPINE 30 MG
60 TABLET, EXTENDED RELEASE 24 HR ORAL ONCE
Refills: 0 | Status: DISCONTINUED | OUTPATIENT
Start: 2024-01-30 | End: 2024-01-31

## 2024-01-30 RX ORDER — MORPHINE SULFATE 50 MG/1
2 CAPSULE, EXTENDED RELEASE ORAL ONCE
Refills: 0 | Status: DISCONTINUED | OUTPATIENT
Start: 2024-01-30 | End: 2024-01-30

## 2024-01-30 RX ADMIN — MORPHINE SULFATE 2 MILLIGRAM(S): 50 CAPSULE, EXTENDED RELEASE ORAL at 23:08

## 2024-01-30 RX ADMIN — Medication 10 MILLIGRAM(S): at 23:49

## 2024-01-30 NOTE — ED PROVIDER NOTE - PROGRESS NOTE DETAILS
Talked with cardiology attending Dr. Hampton to determine if this is a STEMI and he states does not meet STEMI criteria. Talked with CCU attending Dr. Rosen who states pt does not require CCU Talked with Dr. Hampton, cardiology attending and patient will be admitted to cardio tele

## 2024-01-30 NOTE — ED ADULT TRIAGE NOTE - CHIEF COMPLAINT QUOTE
Pt BIBA c/o chronic bilateral lower extremity pain. Pt noted to be hypertensive in triage. Denies cp.

## 2024-01-30 NOTE — ED PROVIDER NOTE - PHYSICAL EXAMINATION
Const: agitated and trying to climb out of the bed   Eyes: PERRL, no conjunctival injection  HENT:  Neck supple without meningismus   CV: tachycardic, Warm, well-perfused extremities  RESP: CTA B/L, no tachypnea   GI: soft, non-tender, non-distended  MSK: No gross deformities appreciated, no LE swelling   Skin: Warm, dry. No rashes

## 2024-01-30 NOTE — ED ADULT NURSE NOTE - OBJECTIVE STATEMENT
pt moving about in bed trying to get out of bed, talking about "the pain in my legs", pt placed in back of dept from triage asked cady if we could move her to view her easily as im concerned pt will get out of bed and fall " I just need to stand up", pt moved to bed 6 , dr muro also reviewing pt, pt not answering any questions as fixated on her legs , pt came in with a walker, no swelling or redness noted to lower legs, pt placed on cardiac monitor , dr modi pf bp

## 2024-01-30 NOTE — ED PROVIDER NOTE - OBJECTIVE STATEMENT
87 yo F with PMH of DM and HTN presents to the ED for 3 days of b/l leg pain. Pt states she has cotton mouth and needs water. Pt denies HA, SOB, CP, palpitations, fevers, chills or abdominal pain.    Patient is a poor historian.

## 2024-01-30 NOTE — ED PROVIDER NOTE - CLINICAL SUMMARY MEDICAL DECISION MAKING FREE TEXT BOX
87 yo M presented to the ED for days of leg pain. EKG done is concerning for ischemia. Talked with cardiology attending who does not believe this to be a STEMI. Pt severely hypertensive concerning for hypertensive emergency. will do labs, cxr, ekg.

## 2024-01-30 NOTE — ED ADULT NURSE NOTE - TEMPLATE LIST FOR HEAD TO TOE ASSESSMENT
LAST SEEN       NEXT APPOINTMENT       LAST FILL   11.5.19  2.5.20   4.19.19 2 bottle-1 refill General

## 2024-01-31 ENCOUNTER — NON-APPOINTMENT (OUTPATIENT)
Age: 89
End: 2024-01-31

## 2024-01-31 ENCOUNTER — INPATIENT (INPATIENT)
Facility: HOSPITAL | Age: 89
LOS: 2 days | Discharge: NURSING, REHAB OR LONG-TERM CARE | DRG: 305 | End: 2024-02-03
Attending: INTERNAL MEDICINE | Admitting: INTERNAL MEDICINE
Payer: MEDICARE

## 2024-01-31 DIAGNOSIS — Z96.642 PRESENCE OF LEFT ARTIFICIAL HIP JOINT: Chronic | ICD-10-CM

## 2024-01-31 DIAGNOSIS — E11.9 TYPE 2 DIABETES MELLITUS WITHOUT COMPLICATIONS: ICD-10-CM

## 2024-01-31 DIAGNOSIS — I10 ESSENTIAL (PRIMARY) HYPERTENSION: ICD-10-CM

## 2024-01-31 DIAGNOSIS — N39.0 URINARY TRACT INFECTION, SITE NOT SPECIFIED: ICD-10-CM
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
## 2024-01-31 DIAGNOSIS — R94.31 ABNORMAL ELECTROCARDIOGRAM [ECG] [EKG]: ICD-10-CM

## 2024-01-31 DIAGNOSIS — M97.8XXA PERIPROSTHETIC FRACTURE AROUND OTHER INTERNAL PROSTHETIC JOINT, INITIAL ENCOUNTER: ICD-10-CM

## 2024-01-31 DIAGNOSIS — Z96.7 PRESENCE OF OTHER BONE AND TENDON IMPLANTS: Chronic | ICD-10-CM

## 2024-01-31 DIAGNOSIS — Z96.643 PRESENCE OF ARTIFICIAL HIP JOINT, BILATERAL: Chronic | ICD-10-CM

## 2024-01-31 LAB
A1C WITH ESTIMATED AVERAGE GLUCOSE RESULT: 7 % — HIGH (ref 4–5.6)
ALBUMIN SERPL ELPH-MCNC: 3.3 G/DL — LOW (ref 3.4–5)
ALP SERPL-CCNC: 93 U/L — SIGNIFICANT CHANGE UP (ref 40–120)
ALT FLD-CCNC: 14 U/L — SIGNIFICANT CHANGE UP (ref 12–42)
ANION GAP SERPL CALC-SCNC: 10 MMOL/L — SIGNIFICANT CHANGE UP (ref 9–16)
APPEARANCE UR: ABNORMAL
APTT BLD: 24.2 SEC — LOW (ref 24.5–35.6)
APTT BLD: 29.7 SEC — SIGNIFICANT CHANGE UP (ref 24.5–35.6)
AST SERPL-CCNC: 16 U/L — SIGNIFICANT CHANGE UP (ref 15–37)
BACTERIA # UR AUTO: PRESENT /HPF — SIGNIFICANT CHANGE UP
BASOPHILS # BLD AUTO: 0.05 K/UL — SIGNIFICANT CHANGE UP (ref 0–0.2)
BASOPHILS NFR BLD AUTO: 0.4 % — SIGNIFICANT CHANGE UP (ref 0–2)
BILIRUB DIRECT SERPL-MCNC: 0.1 MG/DL — SIGNIFICANT CHANGE UP (ref 0–0.3)
BILIRUB INDIRECT FLD-MCNC: 0.4 MG/DL — SIGNIFICANT CHANGE UP (ref 0.2–1)
BILIRUB SERPL-MCNC: 0.5 MG/DL — SIGNIFICANT CHANGE UP (ref 0.2–1.2)
BILIRUB UR-MCNC: NEGATIVE — SIGNIFICANT CHANGE UP
BUN SERPL-MCNC: 21 MG/DL — SIGNIFICANT CHANGE UP (ref 7–23)
CALCIUM SERPL-MCNC: 8.9 MG/DL — SIGNIFICANT CHANGE UP (ref 8.5–10.5)
CHLORIDE SERPL-SCNC: 103 MMOL/L — SIGNIFICANT CHANGE UP (ref 96–108)
CHOLEST SERPL-MCNC: 164 MG/DL — SIGNIFICANT CHANGE UP
CK MB BLD-MCNC: 2.55 % — SIGNIFICANT CHANGE UP
CK MB CFR SERPL CALC: 2.8 NG/ML — SIGNIFICANT CHANGE UP (ref 0.5–3.6)
CK MB CFR SERPL CALC: 6.8 NG/ML — HIGH (ref 0–6.7)
CK MB CFR SERPL CALC: 6.9 NG/ML — HIGH (ref 0–6.7)
CK SERPL-CCNC: 110 U/L — SIGNIFICANT CHANGE UP (ref 26–192)
CK SERPL-CCNC: 147 U/L — SIGNIFICANT CHANGE UP (ref 25–170)
CK SERPL-CCNC: 155 U/L — SIGNIFICANT CHANGE UP (ref 25–170)
CO2 SERPL-SCNC: 23 MMOL/L — SIGNIFICANT CHANGE UP (ref 22–31)
COLOR SPEC: YELLOW — SIGNIFICANT CHANGE UP
CREAT SERPL-MCNC: 0.95 MG/DL — SIGNIFICANT CHANGE UP (ref 0.5–1.3)
DIFF PNL FLD: ABNORMAL
EGFR: 58 ML/MIN/1.73M2 — LOW
EOSINOPHIL # BLD AUTO: 0.09 K/UL — SIGNIFICANT CHANGE UP (ref 0–0.5)
EOSINOPHIL NFR BLD AUTO: 0.8 % — SIGNIFICANT CHANGE UP (ref 0–6)
EPI CELLS # UR: 2 — SIGNIFICANT CHANGE UP
ESTIMATED AVERAGE GLUCOSE: 154 MG/DL — HIGH (ref 68–114)
FLUAV H1 2009 PAND RNA SPEC QL NAA+PROBE: SIGNIFICANT CHANGE UP
FLUAV H1 RNA SPEC QL NAA+PROBE: SIGNIFICANT CHANGE UP
FLUAV H3 RNA SPEC QL NAA+PROBE: SIGNIFICANT CHANGE UP
FLUAV SUBTYP SPEC NAA+PROBE: SIGNIFICANT CHANGE UP
FLUBV RNA SPEC QL NAA+PROBE: SIGNIFICANT CHANGE UP
GLUCOSE BLDC GLUCOMTR-MCNC: 117 MG/DL — HIGH (ref 70–99)
GLUCOSE BLDC GLUCOMTR-MCNC: 218 MG/DL — HIGH (ref 70–99)
GLUCOSE SERPL-MCNC: 125 MG/DL — HIGH (ref 70–99)
GLUCOSE UR QL: NEGATIVE MG/DL — SIGNIFICANT CHANGE UP
HCT VFR BLD CALC: 32.4 % — LOW (ref 34.5–45)
HDLC SERPL-MCNC: 88 MG/DL — SIGNIFICANT CHANGE UP
HGB BLD-MCNC: 10.4 G/DL — LOW (ref 11.5–15.5)
IMM GRANULOCYTES NFR BLD AUTO: 0.3 % — SIGNIFICANT CHANGE UP (ref 0–0.9)
INR BLD: 0.95 — SIGNIFICANT CHANGE UP (ref 0.85–1.18)
INR BLD: 0.96 — SIGNIFICANT CHANGE UP (ref 0.85–1.18)
KETONES UR-MCNC: NEGATIVE MG/DL — SIGNIFICANT CHANGE UP
LACTATE BLDV-MCNC: 0.9 MMOL/L — SIGNIFICANT CHANGE UP (ref 0.5–2)
LEUKOCYTE ESTERASE UR-ACNC: ABNORMAL
LIPID PNL WITH DIRECT LDL SERPL: 67 MG/DL — SIGNIFICANT CHANGE UP
LYMPHOCYTES # BLD AUTO: 2.49 K/UL — SIGNIFICANT CHANGE UP (ref 1–3.3)
LYMPHOCYTES # BLD AUTO: 20.8 % — SIGNIFICANT CHANGE UP (ref 13–44)
MAGNESIUM SERPL-MCNC: 1.8 MG/DL — SIGNIFICANT CHANGE UP (ref 1.6–2.6)
MCHC RBC-ENTMCNC: 27.9 PG — SIGNIFICANT CHANGE UP (ref 27–34)
MCHC RBC-ENTMCNC: 32.1 GM/DL — SIGNIFICANT CHANGE UP (ref 32–36)
MCV RBC AUTO: 86.9 FL — SIGNIFICANT CHANGE UP (ref 80–100)
MONOCYTES # BLD AUTO: 0.65 K/UL — SIGNIFICANT CHANGE UP (ref 0–0.9)
MONOCYTES NFR BLD AUTO: 5.4 % — SIGNIFICANT CHANGE UP (ref 2–14)
NEUTROPHILS # BLD AUTO: 8.66 K/UL — HIGH (ref 1.8–7.4)
NEUTROPHILS NFR BLD AUTO: 72.3 % — SIGNIFICANT CHANGE UP (ref 43–77)
NITRITE UR-MCNC: POSITIVE
NON HDL CHOLESTEROL: 77 MG/DL — SIGNIFICANT CHANGE UP
NRBC # BLD: 0 /100 WBCS — SIGNIFICANT CHANGE UP (ref 0–0)
NT-PROBNP SERPL-SCNC: 2303 PG/ML — HIGH
PH UR: 5.5 — SIGNIFICANT CHANGE UP (ref 5–8)
PLATELET # BLD AUTO: 257 K/UL — SIGNIFICANT CHANGE UP (ref 150–400)
POTASSIUM SERPL-MCNC: 3.5 MMOL/L — SIGNIFICANT CHANGE UP (ref 3.5–5.3)
POTASSIUM SERPL-SCNC: 3.5 MMOL/L — SIGNIFICANT CHANGE UP (ref 3.5–5.3)
PROCALCITONIN SERPL-MCNC: 0.05 NG/ML — SIGNIFICANT CHANGE UP (ref 0.02–0.1)
PROT SERPL-MCNC: 6.7 G/DL — SIGNIFICANT CHANGE UP (ref 6.4–8.2)
PROT UR-MCNC: 30 MG/DL
PROTHROM AB SERPL-ACNC: 10.8 SEC — SIGNIFICANT CHANGE UP (ref 9.5–13)
PROTHROM AB SERPL-ACNC: 10.9 SEC — SIGNIFICANT CHANGE UP (ref 9.5–13)
RAPID RVP RESULT: SIGNIFICANT CHANGE UP
RBC # BLD: 3.73 M/UL — LOW (ref 3.8–5.2)
RBC # FLD: 16.4 % — HIGH (ref 10.3–14.5)
RBC CASTS # UR COMP ASSIST: 27 /HPF — HIGH (ref 0–4)
SARS-COV-2 RNA SPEC QL NAA+PROBE: SIGNIFICANT CHANGE UP
SODIUM SERPL-SCNC: 136 MMOL/L — SIGNIFICANT CHANGE UP (ref 132–145)
SP GR SPEC: 1.02 — SIGNIFICANT CHANGE UP (ref 1–1.03)
TRIGL SERPL-MCNC: 43 MG/DL — SIGNIFICANT CHANGE UP
TROPONIN I, HIGH SENSITIVITY RESULT: 59.9 NG/L — HIGH
TROPONIN T, HIGH SENSITIVITY RESULT: 35 NG/L — SIGNIFICANT CHANGE UP (ref 0–51)
TROPONIN T, HIGH SENSITIVITY RESULT: 38 NG/L — SIGNIFICANT CHANGE UP (ref 0–51)
TSH SERPL-MCNC: 3.76 UIU/ML — HIGH (ref 0.36–3.74)
UROBILINOGEN FLD QL: 1 MG/DL — SIGNIFICANT CHANGE UP (ref 0.2–1)
WBC # BLD: 11.98 K/UL — HIGH (ref 3.8–10.5)
WBC # FLD AUTO: 11.98 K/UL — HIGH (ref 3.8–10.5)
WBC UR QL: SIGNIFICANT CHANGE UP /HPF (ref 0–5)

## 2024-01-31 PROCEDURE — 93306 TTE W/DOPPLER COMPLETE: CPT | Mod: 26

## 2024-01-31 RX ORDER — PANTOPRAZOLE SODIUM 20 MG/1
40 TABLET, DELAYED RELEASE ORAL
Refills: 0 | Status: DISCONTINUED | OUTPATIENT
Start: 2024-01-31 | End: 2024-02-03

## 2024-01-31 RX ORDER — SODIUM CHLORIDE 9 MG/ML
1000 INJECTION, SOLUTION INTRAVENOUS
Refills: 0 | Status: DISCONTINUED | OUTPATIENT
Start: 2024-01-31 | End: 2024-02-03

## 2024-01-31 RX ORDER — GLUCAGON INJECTION, SOLUTION 0.5 MG/.1ML
1 INJECTION, SOLUTION SUBCUTANEOUS ONCE
Refills: 0 | Status: DISCONTINUED | OUTPATIENT
Start: 2024-01-31 | End: 2024-02-03

## 2024-01-31 RX ORDER — POTASSIUM CHLORIDE 20 MEQ
40 PACKET (EA) ORAL ONCE
Refills: 0 | Status: DISCONTINUED | OUTPATIENT
Start: 2024-01-31 | End: 2024-01-31

## 2024-01-31 RX ORDER — ACETAMINOPHEN 500 MG
650 TABLET ORAL EVERY 6 HOURS
Refills: 0 | Status: DISCONTINUED | OUTPATIENT
Start: 2024-01-31 | End: 2024-01-31

## 2024-01-31 RX ORDER — CEFTRIAXONE 500 MG/1
1000 INJECTION, POWDER, FOR SOLUTION INTRAMUSCULAR; INTRAVENOUS EVERY 24 HOURS
Refills: 0 | Status: DISCONTINUED | OUTPATIENT
Start: 2024-01-31 | End: 2024-02-03

## 2024-01-31 RX ORDER — INSULIN LISPRO 100/ML
VIAL (ML) SUBCUTANEOUS
Refills: 0 | Status: DISCONTINUED | OUTPATIENT
Start: 2024-01-31 | End: 2024-02-03

## 2024-01-31 RX ORDER — DEXTROSE 50 % IN WATER 50 %
15 SYRINGE (ML) INTRAVENOUS ONCE
Refills: 0 | Status: DISCONTINUED | OUTPATIENT
Start: 2024-01-31 | End: 2024-02-03

## 2024-01-31 RX ORDER — GABAPENTIN 400 MG/1
100 CAPSULE ORAL THREE TIMES A DAY
Refills: 0 | Status: DISCONTINUED | OUTPATIENT
Start: 2024-01-31 | End: 2024-02-03

## 2024-01-31 RX ORDER — HEPARIN SODIUM 5000 [USP'U]/ML
5000 INJECTION INTRAVENOUS; SUBCUTANEOUS EVERY 12 HOURS
Refills: 0 | Status: DISCONTINUED | OUTPATIENT
Start: 2024-01-31 | End: 2024-02-03

## 2024-01-31 RX ORDER — MAGNESIUM OXIDE 400 MG ORAL TABLET 241.3 MG
400 TABLET ORAL ONCE
Refills: 0 | Status: COMPLETED | OUTPATIENT
Start: 2024-01-31 | End: 2024-01-31

## 2024-01-31 RX ORDER — ACETAMINOPHEN 500 MG
650 TABLET ORAL EVERY 6 HOURS
Refills: 0 | Status: DISCONTINUED | OUTPATIENT
Start: 2024-01-31 | End: 2024-02-03

## 2024-01-31 RX ORDER — LOSARTAN POTASSIUM 100 MG/1
50 TABLET, FILM COATED ORAL DAILY
Refills: 0 | Status: DISCONTINUED | OUTPATIENT
Start: 2024-01-31 | End: 2024-02-03

## 2024-01-31 RX ORDER — DEXTROSE 50 % IN WATER 50 %
25 SYRINGE (ML) INTRAVENOUS ONCE
Refills: 0 | Status: DISCONTINUED | OUTPATIENT
Start: 2024-01-31 | End: 2024-02-03

## 2024-01-31 RX ORDER — GABAPENTIN 400 MG/1
300 CAPSULE ORAL THREE TIMES A DAY
Refills: 0 | Status: DISCONTINUED | OUTPATIENT
Start: 2024-01-31 | End: 2024-01-31

## 2024-01-31 RX ORDER — LIDOCAINE 4 G/100G
1 CREAM TOPICAL DAILY
Refills: 0 | Status: DISCONTINUED | OUTPATIENT
Start: 2024-01-31 | End: 2024-02-03

## 2024-01-31 RX ORDER — DEXTROSE 50 % IN WATER 50 %
12.5 SYRINGE (ML) INTRAVENOUS ONCE
Refills: 0 | Status: DISCONTINUED | OUTPATIENT
Start: 2024-01-31 | End: 2024-02-03

## 2024-01-31 RX ORDER — METOPROLOL TARTRATE 50 MG
25 TABLET ORAL
Refills: 0 | Status: DISCONTINUED | OUTPATIENT
Start: 2024-01-31 | End: 2024-02-03

## 2024-01-31 RX ORDER — INFLUENZA VIRUS VACCINE 15; 15; 15; 15 UG/.5ML; UG/.5ML; UG/.5ML; UG/.5ML
0.7 SUSPENSION INTRAMUSCULAR ONCE
Refills: 0 | Status: DISCONTINUED | OUTPATIENT
Start: 2024-01-31 | End: 2024-02-03

## 2024-01-31 RX ADMIN — CEFTRIAXONE 100 MILLIGRAM(S): 500 INJECTION, POWDER, FOR SOLUTION INTRAMUSCULAR; INTRAVENOUS at 16:03

## 2024-01-31 RX ADMIN — Medication 25 MILLIGRAM(S): at 18:36

## 2024-01-31 RX ADMIN — PANTOPRAZOLE SODIUM 40 MILLIGRAM(S): 20 TABLET, DELAYED RELEASE ORAL at 07:01

## 2024-01-31 RX ADMIN — LOSARTAN POTASSIUM 50 MILLIGRAM(S): 100 TABLET, FILM COATED ORAL at 07:01

## 2024-01-31 RX ADMIN — GABAPENTIN 100 MILLIGRAM(S): 400 CAPSULE ORAL at 05:28

## 2024-01-31 RX ADMIN — HEPARIN SODIUM 5000 UNIT(S): 5000 INJECTION INTRAVENOUS; SUBCUTANEOUS at 06:26

## 2024-01-31 RX ADMIN — Medication 4: at 21:37

## 2024-01-31 RX ADMIN — LIDOCAINE 1 PATCH: 4 CREAM TOPICAL at 19:00

## 2024-01-31 RX ADMIN — GABAPENTIN 100 MILLIGRAM(S): 400 CAPSULE ORAL at 21:36

## 2024-01-31 RX ADMIN — LIDOCAINE 1 PATCH: 4 CREAM TOPICAL at 16:03

## 2024-01-31 RX ADMIN — GABAPENTIN 100 MILLIGRAM(S): 400 CAPSULE ORAL at 16:02

## 2024-01-31 RX ADMIN — MAGNESIUM OXIDE 400 MG ORAL TABLET 400 MILLIGRAM(S): 241.3 TABLET ORAL at 05:28

## 2024-01-31 RX ADMIN — HEPARIN SODIUM 5000 UNIT(S): 5000 INJECTION INTRAVENOUS; SUBCUTANEOUS at 18:36

## 2024-01-31 NOTE — H&P ADULT - REASON FOR ADMISSION
Transfer from [facility] for HTN management with new ECG changes compared to 5/2023

## 2024-01-31 NOTE — ED ADULT NURSE REASSESSMENT NOTE - NS ED NURSE REASSESS COMMENT FT1
Pt ambulated back from bathroom, was unable to provide urine sample  assisted back to stretcher and placed back on CM  pending admission to [facility]

## 2024-01-31 NOTE — PHYSICAL THERAPY INITIAL EVALUATION ADULT - PERTINENT HX OF CURRENT PROBLEM, REHAB EVAL
88 year old female initially presented at [facility] after mechanical fall at home with negative headstrike c/b Lt hip pain. Per patient, she has been having multiple falls at home and endorses generalized weakness -- geoffrey when ambulating. For this recent mechanical fall event, she was opening the door and then fell on her Lt hip. While at [facility] ED, VS notable for HTN with /100 and sinus tachycardia to 110s 2/2 pain. Pt was then transferred to [facility] cardiology service for further monitoring and management.

## 2024-01-31 NOTE — PATIENT PROFILE ADULT - FALL HARM RISK - HARM RISK INTERVENTIONS

## 2024-01-31 NOTE — H&P ADULT - NSHPPHYSICALEXAM_GEN_ALL_CORE
T(C): 36.3 (01-30-24 @ 22:25), Max: 36.3 (01-30-24 @ 22:25)  HR: 78 (01-31-24 @ 00:18) (78 - 125)  BP: 109/55 (01-31-24 @ 00:18) (109/55 - 210/100)  RR: 16 (01-31-24 @ 00:18) (16 - 16)  SpO2: 97% (01-31-24 @ 00:18) (97% - 98%)  Wt(kg): --    Appearance: NAD  HEENT:   Normal oral mucosa,  EOMI	  Neck: Supple,  - JVD; No Carotid Bruit and 2+ pulses B/L  Cardiovascular: Normal S1 S2,  No murmurs  Respiratory: Lungs clear to auscultation/No Rales, Rhonchi, Wheezing	  Gastrointestinal:  Soft, Non-tender, + BS	  Skin: No rashes, No ecchymoses, No cyanosis  Extremities: Normal range of motion, No clubbing, cyanosis or edema  Vascular: Femoral pulses 2+ b/l without bruit, DP 1+ b/l, PT 1+ b/l  Neurologic: Non-focal  Psychiatry: A & O x 3, Mood & affect appropriate

## 2024-01-31 NOTE — H&P ADULT - HISTORY OF PRESENT ILLNESS
A&O X    Full Code    87 y/o female, POOR HISTORIAN  with PMHx of DM2, HTN (hx of non compliant with medication), Arthritis, hx Basal Cell Carcinoma, hx of UTI,  Lumbar Compression Fx, b/l hip replacement (most recent 5/30/20 R Intertrochanteric Femur Fx) with 5/17/2023 closed Periprosthetic proximal Left Femur Fx  [facility] to hx of falls, presents as a transfer this morning from [facility] with uncontrolled HTN and new ECG changes, Sinus Tachy  TWI V4-V6 JESSICA AvR compared to 5/2023.  Pt. originally presented to [facility] yesterday evening complaining of 3 days of b/l leg pain and dry mouth.   During evaluation pt. became agitated, screaming trying to get out of bed 2/2 to b/l LE pain and dry mouth.  Patient's BP noted to be 210/100, she was given Labetalol 10mg IV X1 and Morphine 2mg IV X1 decreasing /80.  ECG was performed revealing concerns for ischemia and was transferred to [facility] for cardiac workup to rule out ACS and HTN management.   Pt. denies fever, chills, HA, cough, chest pain, SOB, palpitations, diaphoresis, dizziness, abdominal discomfort and n/v/d.    ER labs H/H 11/35.2 Plts 302, WBC 13.2, K+ 3.9 (supplemented Potassium Chloride 40 mEq), Mg 1.8 (supplemented with Mag Oxide 400mg POX1), BUN/Cr 22/1.07, Blood Glucose 177.  VSS 88F with hx of HTN, DM2, recurrent UTIs, osteoarthritis, chronic back pain, lumbar compression fx, recurrent falls c/b Lt hip fx s/p Lt hip hemiarthroplasty (2017), Rt intertrochanteric femoral fx s/p IMF (5/30/20), and Lt femoral periprosthetic fx (5/2023, medically managed), who initially presented at [facility] after mechanical fall at home with negative headstrike c/b Lt hip pain.    Per patient, she has been having multiple falls at home and endorses generalized weakness -- geoffrey when ambulating. For this recent mechanical fall event, she was opening the door and then fell on her Lt hip; denies headstrike, concurrent lightheadedness/dizziness, palpitations, CP/pressure, and SOB. Additionally, pt also denies any fever, chills, fatigue, headache, dizziness/lightheadedness, CP/pressure, palpitations, SOB, abdominal discomfort, N/V/D, decreased appetite, extremity pain/edema, or recent sick contacts/travel.    While at [facility] ED, VS notable for HTN  with /100 and sinus tachycardia to 110s 2/2 pain. Labs on admission notable for leukocytosis with WBC 13, elevated BNP 2.3K, and elevated hsTrop T 60; EKG with new TWI in V4-6 and JESSICA in aVR. CXR unremarkable and Pelvic XR negative for acute fracture/displacement. Infectious work-up notable for +UA; negative RVP. Pt received Labetalol 10mg IV x1 and Morphine 2mg IV x1 with subsequent improvement of pain, BP, and HR. Pt was then transferred to [facility] cardiology service for further monitoring and management.

## 2024-01-31 NOTE — H&P ADULT - ASSESSMENT
# Fracture, proximal femur  Patient sustained left periprosthetic fracture of the proximal left femur seen on CT scan after fall.  Ortho consulted, do not recommend acute surgical intervention.  Patient was evaluated by PT, recommend [facility].    - Toe touch weight bearing LLE 4-6 weeks  - Follow up with Dr. Stewart outpatient  - Continue pain control: Tylenol 650mg po q6hrs for mild pain, Toradol 10mg po q8hrs PRN for moderate (using sparingly), oxycodone IR 5 mg q6hrs for severe pain   - Hold home Aspirin 81mg while patient in getting Toradol for pain.  Once pain improved and does not require NSAIDs please restart Aspirin 81mg   - Continue Gabapentin 100mg TID, flexeril 5mg TID, lidocaine patch.   - Continue PT/OT daily   - ICP/OOB as tolerated.    # Leukocytosis  RESOLVED- Leukocytosis on admission with neutrophilic predominance, likely reactive after fall and fracture.  UA without evidence of infection- no reported infectious symptoms per patient.  Patient remained afebrile, no cough, no dysuria, CXR without consolidation or infiltrate.      # Type 2 diabetes mellitus  Patient with history of diabetes, home medications metformin.  Continue home medications.     # HTN (hypertension)  Patient with history of hypertension, however not on any medications at home.  BP was likely initially elevated in setting of pain but removed over the last day.  Will continue to monitor off medications.       Patient was discharged to: [facility]  New medications:  Tylenol 650mg q6hrs for mild pain, Toradol 15mg q6hrs PRN for moderate (using sparingly), oxycodone IR 5 mg q6hrs for severe pain, Gabapenin 100mg TID, Flexeril 5mg TID Lidocaine patch   Changes to old medications:  none   Medications that were stopped: none   Items to Follow up as outpatient: none   Physical exam at time of discharge:     88F with hx of HTN, DM2, recurrent UTIs, osteoarthritis, chronic back pain, lumbar compression fx, recurrent falls c/b Lt hip fx s/p Lt hip hemiarthroplasty (2017), Rt intertrochanteric femoral fx s/p IMF (5/30/20), and Lt femoral periprosthetic fx (5/2023, medically managed), who initially presented at [facility] after mechanical fall at home with negative headstrike c/b Lt hip pain and uncontrolled HTN. Undergoing HTN management and ischemic evaluation.

## 2024-01-31 NOTE — H&P ADULT - SKIN COMMENTS
Wound noted on Lt dorsal aspect of hand between 2nd and 3rd digits 2/2 dog bite -- +redness and tenderness with hard palpation; no warmth or expressable drainage noted

## 2024-01-31 NOTE — PATIENT PROFILE ADULT - FUNCTIONAL ASSESSMENT - BASIC MOBILITY 6.
3-calculated by average/Not able to assess (calculate score using [facility] averaging method)

## 2024-01-31 NOTE — PHYSICAL THERAPY INITIAL EVALUATION ADULT - ADDITIONAL COMMENTS
amb with rollator. Doesn't use the shower, sponge bathes. Home health aid 5 hours x 5 days. Patient has a wheelchair. Does her own grocery shopping and ambulates in the community without her home health aid.

## 2024-01-31 NOTE — H&P ADULT - NSICDXPASTMEDICALHX_GEN_ALL_CORE_FT
PAST MEDICAL HISTORY:  Chronic back pain     DM2 (diabetes mellitus, type 2)     ECG abnormal     H/O bilateral hip replacements     History of UTI     Hypertension     Lumbar compression fracture     Multiple falls      PAST MEDICAL HISTORY:  Chronic back pain     DM2 (diabetes mellitus, type 2)     History of UTI     Hypertension     Lumbar compression fracture     Multiple falls     Periprosthetic hip fracture

## 2024-01-31 NOTE — H&P ADULT - NSHPREVIEWOFSYSTEMS_GEN_ALL_CORE
GENERAL, CONSTITUTIONAL : denies recent weight loss, fever, chills  EYES, VISION: denies changes in vision   EARS, NOSE, THROAT: denies hearing loss  HEART, CARDIOVASCULAR: denies chest pain, arrhythmia, palpitations, SOB,  LE edema, claudication  RESPIRATORY: Denies cough, SOB, wheezing, PND, orthopnea  GASTROINTESTINAL: Denies abdominal pain, heartburn, bloody stool, dark tarry stool  GENITOURINARY: Denies frequent urination, urgency  MUSCULOSKELETAL admits to  joint pain  restricted motion, musculoskeletal pain.   SKIN & INTEGUMENTARY Denies rashes, sores, blisters, blisters, growths.  NEUROLOGICAL: Denies numbness or tingling sensations, sensation loss, burning.   PSYCHIATRIC: Denies nervousness, anxiety, depression  ENDOCRINE Denies heat or cold intolerance, excessive thirst  HEMATOLOGIC/LYMPHATIC: Denies abnormal bleeding, bleeding of any kind

## 2024-01-31 NOTE — H&P ADULT - PROBLEM SELECTOR PLAN 2
- Hx of recent Lt femoral periprosthetic fx admission in 5/2023 -- medical management and not deemed as acute surgical candidate per Ortho notes  - Pain management as above

## 2024-01-31 NOTE — H&P ADULT - PROBLEM SELECTOR PLAN 1
- Pelvic XR (1/31/24): negative for acute fx/displacement   - Pain management: Tylenol 650mg q6hrs + Lidocaine patch

## 2024-01-31 NOTE — H&P ADULT - PROBLEM SELECTOR PLAN 3
- EKG on admission with new TWI in V4-6 and JESSICA in aVR  - Trend cardiac enzymes to peak   - F/up repeat EKG   - If changes unresolved, consider ischemic eval with NST vs. CCTA

## 2024-01-31 NOTE — H&P ADULT - NSICDXPASTSURGICALHX_GEN_ALL_CORE_FT
PAST SURGICAL HISTORY:  History of bilateral hip arthroplasty      PAST SURGICAL HISTORY:  Fixation hardware in lower extremity     History of bilateral hip arthroplasty     History of left hip hemiarthroplasty

## 2024-01-31 NOTE — H&P ADULT - BMI (KG/M2)
Patient Care Team:  Chacha Pineda MD as PCP - General (Family Medicine)  Harlan Downing MD as Consulting Physician (Cardiology)  Elliott Chawla MD as Consulting Physician (Otolaryngology)    Chief complaint: Ascending aortic aneurysm    Subjective     History of Present Illness  Patient is a 84 y.o. male with a past medical history including hypertension, hyperlipidemia, chronic back pain, BPH, basal cell carcinoma, atrial fibrillation and hypothyroidism. He denies tobacco, ETOH, or illicit drug use. Patient presented to the ER on 10/2/23 with complaints of chest pain that had been ongoing for a few hours prior to presentation. The pain began after he ate a greasy meal. He reports that he has had episodes of chest pain/off and on over the last 6 months but work up has essentially been negative. He has a known history of aortic aneurysm. CT of the chest showed this to be stable, measuring 4.6 cm at maximum diameter per my independent measurement. CT of the abdomen and pelvis noted distention of the gallbladder with gallstones. Ultrasound confirmed cholecystitis. Surgery was consulted, and the plan is for laparoscopic cholecystectomy once INR has normalized. Patient was on warfarin prior to admission. We were asked to see the patient for evaluation of his ascending aortic aneurysm.     Review of Systems   Constitutional:  Negative for activity change and fatigue.   HENT:  Negative for dental problem and hearing loss.    Respiratory:  Positive for chest tightness.    Cardiovascular:  Positive for chest pain. Negative for palpitations and leg swelling.   Gastrointestinal:  Positive for abdominal pain.   Endocrine: Negative for polydipsia, polyphagia and polyuria.      Past Medical History:   Diagnosis Date    AAA (abdominal aortic aneurysm) without rupture     Aneurysm of thoracic aorta     CT CHEST 8/2021 [facility]     Arthritis     Basal cell carcinoma     RIGHT LOWER LID     BPH (benign  prostatic hyperplasia)     Chronic lumbar pain     Degenerative arthritis of lumbar spine     Degenerative cervical disc     Disease of thyroid gland     Elevated rheumatoid factor     History of atrial fibrillation     Hyperlipidemia     Hypertension     Hypogonadism in male     Muscle strain of left upper back     PRESCRIBED PREDNISONE DOSE PACK     On anticoagulant therapy     Pulmonary nodule     6 MM - REPEAT CT SCAN IN ONE YEAR, WATCHING     Refused influenza vaccine     Scoliosis     Vitamin D deficiency      Past Surgical History:   Procedure Laterality Date    ANKLE FUSION Right     CARDIAC CATHETERIZATION      CARDIAC CATHETERIZATION N/A 7/2/2021    Procedure: Left Heart Cath;  Surgeon: Harlan Downing MD;  Location: [facility] CATH [facility]VASIVE LOCATION;  Service: Cardiology;  Laterality: N/A;    CARDIAC CATHETERIZATION N/A 7/2/2021    Procedure: Coronary angiography;  Surgeon: Harlan Downing MD;  Location:  [facility] CATH INVASIVE LOCATION;  Service: Cardiology;  Laterality: N/A;    CARDIAC CATHETERIZATION N/A 7/2/2021    Procedure: Left ventriculography;  Surgeon: Harlan Downing MD;  Location: [facility] CATH [facility]IVE LOCATION;  Service: Cardiology;  Laterality: N/A;    CARDIAC CATHETERIZATION N/A 12/30/2022    Procedure: Left Heart Cath check inr;  Surgeon: Harlan Downing MD;  Location: [facility] CATH [facility] LOC[facility];  Service: Cardiology;  Laterality: N/A;    CARDIAC CATHETERIZATION N/A 12/30/2022    Procedure: Coronary angiography;  Surgeon: Harlan Downing MD;  Location: [facility] CATH [facility]IVE LOCATION;  Service: Cardiology;  Laterality: N/A;    CARDIAC CATHETERIZATION N/A 12/30/2022    Procedure: Left ventriculography;  Surgeon: Harlan Downing MD;  Location: [facility] CATH [facility]IVE LOC[facility];  Service: Cardiology;  Laterality: N/A;    HOBBS TUBE INSERTION Right 10/19/2021    Procedure: RIGHT SECOND STAGE CAST TAKEDOWN RECONSTRUCTION;  Surgeon: Brian Bhatt MD;   Location: [facility];  Service: Ophthalmology;  Laterality: Right;    ENTROPIAN REPAIR Right 9/21/2021    Procedure: RIGHT LOWER LID EXCISION OF BASAL CELL CARCINOMA WITH FROZEN SECTION RIGHT LOWER LID RECONSTRUCTION WITH CAST FLAP, REPAIR OF CANULICULIS, AND FULL THICKNESS SKIN GRAFT;  Surgeon: Brian Bhatt MD;  Location: [facility];  Service: Ophthalmology;  Laterality: Right;    INGUINAL HERNIA REPAIR Right     REPLACEMENT TOTAL KNEE Bilateral 2000    ROTATOR CUFF REPAIR Left     ROTATOR CUFF REPAIR Right     SKIN BIOPSY      VASECTOMY       Family History   Problem Relation Age of Onset    Hypertension Father     Heart attack Father     Heart attack Brother     Alcohol abuse Brother     Hypertension Brother     Hypertension Paternal Grandmother     Hypertension Paternal Grandfather     Anemia Mother     Arthritis Mother     Hypertension Mother     Hypertension Maternal Grandmother     Heart disease Other         FH in males before age 55    Malig Hyperthermia Neg Hx      Social History     Tobacco Use    Smoking status: Never    Smokeless tobacco: Never   Vaping Use    Vaping Use: Never used   Substance Use Topics    Alcohol use: No    Drug use: No     Medications Prior to Admission   Medication Sig Dispense Refill Last Dose    acetaminophen (TYLENOL) 325 MG tablet Take 2 tablets by mouth Every 4 (Four) Hours As Needed for Mild Pain .       amiodarone (PACERONE) 200 MG tablet Take 1/2 (one-half) tablet by mouth once daily 45 tablet 2     aspirin 81 MG EC tablet Take 1 tablet by mouth Daily.       doxazosin (CARDURA) 2 MG tablet TAKE 1 TABLET BY MOUTH ONCE DAILY AT NIGHT 90 tablet 0     esomeprazole (nexIUM) 20 MG capsule Take 1 capsule by mouth Every Morning Before Breakfast.       hydrALAZINE (APRESOLINE) 25 MG tablet Take 1 tablet by mouth 3 (Three) Times a Day. 270 tablet 1     isosorbide mononitrate (IMDUR) 30 MG 24 hr tablet Take 1 tablet by mouth once daily 90 tablet 2     levothyroxine  "(SYNTHROID, LEVOTHROID) 112 MCG tablet Take 1 tablet by mouth once daily 90 tablet 1     nebivolol (BYSTOLIC) 10 MG tablet Take 0.5 tablets by mouth Daily.       olmesartan-hydrochlorothiazide (BENICAR HCT) 40-25 MG per tablet TAKE ONE TABLET BY MOUTH DAILY 90 tablet 0     rosuvastatin (CRESTOR) 5 MG tablet Take 1 tablet by mouth once daily 90 tablet 1     warfarin (COUMADIN) 5 MG tablet Take 1 tablet by mouth once daily 45 tablet 1     warfarin (COUMADIN) 7.5 MG tablet Take 1 tablet by mouth Every Night. 90 tablet 1      amiodarone, 100 mg, Oral, Daily  aspirin, 81 mg, Oral, Daily  hydrALAZINE, 25 mg, Oral, TID  isosorbide mononitrate, 30 mg, Oral, Daily  levothyroxine, 112 mcg, Oral, Daily  nebivolol, 5 mg, Oral, Daily  pantoprazole, 40 mg, Oral, Q AM  piperacillin-tazobactam, 3.375 g, Intravenous, Q8H  rosuvastatin, 5 mg, Oral, Daily  senna-docusate sodium, 2 tablet, Oral, BID  sodium chloride, 10 mL, Intravenous, Q12H  terazosin, 2 mg, Oral, Nightly      Allergies:  Patient has no known allergies.    Objective      Vital Signs  Temp:  [97.5 øF (36.4 øC)-99.9 øF (37.7 øC)] 97.5 øF (36.4 øC)  Heart Rate:  [74-94] 81  Resp:  [16-20] 16  BP: (102-131)/(66-71) 131/71    Flowsheet Rows      Flowsheet Row First Filed Value   Admission Height 182.9 cm (72\") Documented at 10/02/2023 1908   Admission Weight 93 kg (205 lb) Documented at 10/02/2023 1908          182.9 cm (72\")    Physical Exam  Constitutional:       General: He is not in acute distress.     Appearance: Normal appearance. He is not toxic-appearing.   HENT:      Head: Normocephalic and atraumatic.      Nose: Nose normal. No congestion or rhinorrhea.      Mouth/Throat:      Mouth: Mucous membranes are moist.      Pharynx: Oropharynx is clear.   Eyes:      Pupils: Pupils are equal, round, and reactive to light.   Cardiovascular:      Rate and Rhythm: Normal rate and regular rhythm.   Pulmonary:      Effort: Pulmonary effort is normal.      Breath sounds: Normal " breath sounds.   Abdominal:      General: Bowel sounds are normal.      Palpations: Abdomen is soft.      Tenderness: There is abdominal tenderness.   Musculoskeletal:         General: No swelling. Normal range of motion.   Skin:     General: Skin is warm and dry.      Capillary Refill: Capillary refill takes less than 2 seconds.   Neurological:      General: No focal deficit present.      Mental Status: He is alert and oriented to person, place, and time. Mental status is at baseline.       Results Review:   Lab Results (last 24 hours)       Procedure Component Value Units Date/Time    High Sensitivity Troponin T 2Hr [[id number]]  (Abnormal) Collected: 10/04/23 0802    Specimen: Blood Updated: 10/04/23 0832     HS Troponin T 28 ng/L      Troponin T Delta -2 ng/L     Narrative:      High Sensitive Troponin T Reference Range:  <10.0 ng/L- Negative Female for AMI  <15.0 ng/L- Negative Male for AMI  >=10 - Abnormal Female indicating possible myocardial injury.  >=15 - Abnormal Male indicating possible myocardial injury.   Clinicians would have to utilize clinical acumen, EKG, Troponin, and serial changes to determine if it is an Acute Myocardial Infarction or myocardial injury due to an underlying chronic condition.         Comprehensive Metabolic Panel [[id number]]  (Abnormal) Collected: 10/04/23 0338    Specimen: Blood Updated: 10/04/23 0443     Glucose 101 mg/dL      BUN 23 mg/dL      Creatinine 1.18 mg/dL      Sodium 137 mmol/L      Potassium 3.1 mmol/L      Chloride 101 mmol/L      CO2 26.0 mmol/L      Calcium 8.4 mg/dL      Total Protein 5.5 g/dL      Albumin 3.2 g/dL      ALT (SGPT) 11 U/L      AST (SGOT) 11 U/L      Alkaline Phosphatase 43 U/L      Total Bilirubin 1.1 mg/dL      Globulin 2.3 gm/dL      A/G Ratio 1.4 g/dL      BUN/Creatinine Ratio 19.5     Anion Gap 10.0 mmol/L      eGFR 61.6 mL/min/1.73     Narrative:      GFR Normal >60  Chronic Kidney Disease <60  Kidney Failure <15    The GFR formula is only  valid for adults with stable renal function between ages 18 and 70.    High Sensitivity Troponin T [[id number]]  (Abnormal) Collected: 10/04/23 0338    Specimen: Blood Updated: 10/04/23 0434     HS Troponin T 30 ng/L     Narrative:      High Sensitive Troponin T Reference Range:  <10.0 ng/L- Negative Female for AMI  <15.0 ng/L- Negative Male for AMI  >=10 - Abnormal Female indicating possible myocardial injury.  >=15 - Abnormal Male indicating possible myocardial injury.   Clinicians would have to utilize clinical acumen, EKG, Troponin, and serial changes to determine if it is an Acute Myocardial Infarction or myocardial injury due to an underlying chronic condition.         Protime-INR [[id number]]  (Abnormal) Collected: 10/04/23 0338    Specimen: Blood Updated: 10/04/23 0426     Protime 23.5 Seconds      INR 2.04    CBC & Differential [[id number]]  (Abnormal) Collected: 10/04/23 0338    Specimen: Blood Updated: 10/04/23 0416    Narrative:      The following orders were created for panel order CBC & Differential.  Procedure                               Abnormality         Status                     ---------                               -----------         ------                     CBC Auto Differential[[id number]]        Abnormal            Final result                 Please view results for these tests on the individual orders.    CBC Auto Differential [[id number]]  (Abnormal) Collected: 10/04/23 0338    Specimen: Blood Updated: 10/04/23 0416     WBC 17.92 10*3/mm3      RBC 3.90 10*6/mm3      Hemoglobin 12.7 g/dL      Hematocrit 38.5 %      MCV 98.7 fL      MCH 32.6 pg      MCHC 33.0 g/dL      RDW 11.8 %      RDW-SD 42.9 fl      MPV 12.1 fL      Platelets 162 10*3/mm3      Neutrophil % 90.3 %      Lymphocyte % 4.0 %      Monocyte % 4.8 %      Eosinophil % 0.0 %      Basophil % 0.2 %      Immature Grans % 0.7 %      Neutrophils, Absolute 16.19 10*3/mm3      Lymphocytes, Absolute 0.72 10*3/mm3      Monocytes,  Absolute 0.86 10*3/mm3      Eosinophils, Absolute 0.00 10*3/mm3      Basophils, Absolute 0.03 10*3/mm3      Immature Grans, Absolute 0.12 10*3/mm3      nRBC 0.0 /100 WBC     Protime-INR [[id number]]  (Abnormal) Collected: 10/03/23 1417    Specimen: Blood Updated: 10/03/23 1445     Protime 23.5 Seconds      INR 2.05          Assessment & Plan  - ascending aortic aneurysm--stable at 4.6 cm  - acute cholecystitis--plan for lap shaan late this week once INR normalized  - hypertension  - hyperlipidemia  - PAF--on chronic AC with warfarin  - BPH  - hypothyroidism--on levothyroxine   - chronic back pain    After chart review, it seems that his aortic aneurysm has been stable for some time. Will plan to follow up with him in 1 year in our aorta clinic with a CT of the chest without contrast. Our office will reach out to arrange follow up  Will sign off. Please call with any questions or concerns    Thank you for allowing us to participate in the care of this patient.      ALVA Archer  10/04/23  10:00 EDT           32.9

## 2024-01-31 NOTE — H&P ADULT - NSHPLABSRESULTS_GEN_ALL_CORE
11.4   13.17 )-----------( 302      ( 30 Jan 2024 22:57 )             35.2       01-30    139  |  101  |  22  ----------------------------<  177<H>  3.9   |  26  |  1.07    Ca    9.2      30 Jan 2024 22:57  Mg     1.8     01-30    TPro  7.6  /  Alb  3.8  /  TBili  0.3  /  DBili  x   /  AST  18  /  ALT  16  /  AlkPhos  111  01-30      PT/INR - ( 30 Jan 2024 22:59 )   PT: 10.8 sec;   INR: 0.95          PTT - ( 30 Jan 2024 22:59 )  PTT:28.6 sec          Urinalysis Basic - ( 30 Jan 2024 22:57 )    Color: x / Appearance: x / SG: x / pH: x  Gluc: 177 mg/dL / Ketone: x  / Bili: x / Urobili: x   Blood: x / Protein: x / Nitrite: x   Leuk Esterase: x / RBC: x / WBC x   Sq Epi: x / Non Sq Epi: x / Bacteria: x        EKG: Sinus Tachy  with TWI V4-V6 and JESSICA in AvR 10.4   11.98 )-----------( 257      ( 31 Jan 2024 05:30 )             32.4     01-31    136  |  103  |  21  ----------------------------<  125<H>  3.5   |  23  |  0.95    Ca    8.9      31 Jan 2024 05:30  Mg     1.8     01-31    TPro  6.7  /  Alb  3.3<L>  /  TBili  0.5  /  DBili  0.1  /  AST  16  /  ALT  14  /  AlkPhos  93  01-31    PT/INR - ( 31 Jan 2024 05:30 )   PT: 10.8 sec;   INR: 0.95          PTT - ( 31 Jan 2024 05:30 )  PTT:29.7 sec    hsTrop T: 59.9   BNP: 2303  Lactate: 0.9

## 2024-01-31 NOTE — PATIENT PROFILE ADULT - HOW PATIENT ADDRESSED, PROFILE
[FreeTextEntry1] : 49 year old male with prior hx of atrial fibrillation (2 episodes )after having alcohol , last  episode 2 years ago , hyperlipidemia , obesity , mild sleep apnea came for  follow up says  he is doing well , patient was diagnosed to have mild sleep apnea , not happy with cpap , which is interfering  with his sleep pattern so he stopped using it , \par \par \par Patient denies active complain \par \par \par patient had normal stress test without  stress induced arrhythmia ,  normal echo \par \par Patient did have hx of atrial fibrillation episode at age 38 when he had caffeine , alcohol,   again two years  after having alcohol  drinks ) converted to sinus rhythm \par \par patient does have elevated cholesterol , as well as HDL , patient does have hx mild sleep apnea , does not have day time fatigue , did gain 15 pounds ,   his blood work showed     he is taking red yeast rice . his blood pressure is controlled \par \par Patient exercise , does work out regularly , he has low heart rate  denies any dizziness or exertional fatigue Sakina

## 2024-01-31 NOTE — H&P ADULT - PROBLEM SELECTOR PLAN 6
- Monitor FS while on ISS (Home: Metformin 1000mg BID)   - F/up A1c    - Fluids: not indicated  - Replete Lytes PRN to K>4, Mg>2  - Diet: DASH   - DVT ppx: SQH   - GI ppx: PPI   - PT eval: pending  - SW/Dispo: Medically active

## 2024-01-31 NOTE — H&P ADULT - NSHPOUTPATIENTPROVIDERS_GEN_ALL_CORE
PCP Dr. Tami Norie[facility] [address] [facility]; Ortho Dr. Stewart CAROLYN Kenney

## 2024-02-01 ENCOUNTER — TRANSCRIPTION ENCOUNTER (OUTPATIENT)
Age: 89
End: 2024-02-01

## 2024-02-01 LAB
A1C WITH ESTIMATED AVERAGE GLUCOSE RESULT: 7.1 % — HIGH (ref 4–5.6)
ALBUMIN SERPL ELPH-MCNC: 3.2 G/DL — LOW (ref 3.3–5)
ALP SERPL-CCNC: 82 U/L — SIGNIFICANT CHANGE UP (ref 40–120)
ALT FLD-CCNC: 6 U/L — LOW (ref 10–45)
ANION GAP SERPL CALC-SCNC: 8 MMOL/L — SIGNIFICANT CHANGE UP (ref 5–17)
AST SERPL-CCNC: 14 U/L — SIGNIFICANT CHANGE UP (ref 10–40)
BILIRUB SERPL-MCNC: 0.3 MG/DL — SIGNIFICANT CHANGE UP (ref 0.2–1.2)
BUN SERPL-MCNC: 21 MG/DL — SIGNIFICANT CHANGE UP (ref 7–23)
CALCIUM SERPL-MCNC: 9.1 MG/DL — SIGNIFICANT CHANGE UP (ref 8.4–10.5)
CHLORIDE SERPL-SCNC: 102 MMOL/L — SIGNIFICANT CHANGE UP (ref 96–108)
CHOLEST SERPL-MCNC: 151 MG/DL — SIGNIFICANT CHANGE UP
CO2 SERPL-SCNC: 26 MMOL/L — SIGNIFICANT CHANGE UP (ref 22–31)
CREAT SERPL-MCNC: 0.92 MG/DL — SIGNIFICANT CHANGE UP (ref 0.5–1.3)
EGFR: 61 ML/MIN/1.73M2 — SIGNIFICANT CHANGE UP
ESTIMATED AVERAGE GLUCOSE: 157 MG/DL — HIGH (ref 68–114)
GLUCOSE BLDC GLUCOMTR-MCNC: 122 MG/DL — HIGH (ref 70–99)
GLUCOSE BLDC GLUCOMTR-MCNC: 163 MG/DL — HIGH (ref 70–99)
GLUCOSE BLDC GLUCOMTR-MCNC: 165 MG/DL — HIGH (ref 70–99)
GLUCOSE BLDC GLUCOMTR-MCNC: 90 MG/DL — SIGNIFICANT CHANGE UP (ref 70–99)
GLUCOSE SERPL-MCNC: 96 MG/DL — SIGNIFICANT CHANGE UP (ref 70–99)
HCT VFR BLD CALC: 30.6 % — LOW (ref 34.5–45)
HDLC SERPL-MCNC: 73 MG/DL — SIGNIFICANT CHANGE UP
HGB BLD-MCNC: 10 G/DL — LOW (ref 11.5–15.5)
LIPID PNL WITH DIRECT LDL SERPL: 69 MG/DL — SIGNIFICANT CHANGE UP
MAGNESIUM SERPL-MCNC: 1.9 MG/DL — SIGNIFICANT CHANGE UP (ref 1.6–2.6)
MCHC RBC-ENTMCNC: 28.9 PG — SIGNIFICANT CHANGE UP (ref 27–34)
MCHC RBC-ENTMCNC: 32.7 GM/DL — SIGNIFICANT CHANGE UP (ref 32–36)
MCV RBC AUTO: 88.4 FL — SIGNIFICANT CHANGE UP (ref 80–100)
NON HDL CHOLESTEROL: 78 MG/DL — SIGNIFICANT CHANGE UP
NRBC # BLD: 0 /100 WBCS — SIGNIFICANT CHANGE UP (ref 0–0)
PLATELET # BLD AUTO: 231 K/UL — SIGNIFICANT CHANGE UP (ref 150–400)
POTASSIUM SERPL-MCNC: 4.3 MMOL/L — SIGNIFICANT CHANGE UP (ref 3.5–5.3)
POTASSIUM SERPL-SCNC: 4.3 MMOL/L — SIGNIFICANT CHANGE UP (ref 3.5–5.3)
PROT SERPL-MCNC: 5.6 G/DL — LOW (ref 6–8.3)
RBC # BLD: 3.46 M/UL — LOW (ref 3.8–5.2)
RBC # FLD: 17.2 % — HIGH (ref 10.3–14.5)
SODIUM SERPL-SCNC: 136 MMOL/L — SIGNIFICANT CHANGE UP (ref 135–145)
TRIGL SERPL-MCNC: 47 MG/DL — SIGNIFICANT CHANGE UP
WBC # BLD: 9.3 K/UL — SIGNIFICANT CHANGE UP (ref 3.8–10.5)
WBC # FLD AUTO: 9.3 K/UL — SIGNIFICANT CHANGE UP (ref 3.8–10.5)

## 2024-02-01 RX ORDER — METOPROLOL TARTRATE 50 MG
1 TABLET ORAL
Qty: 60 | Refills: 0
Start: 2024-02-01 | End: 2024-03-01

## 2024-02-01 RX ORDER — CEFPODOXIME PROXETIL 100 MG
1 TABLET ORAL
Qty: 10 | Refills: 0
Start: 2024-02-01 | End: 2024-02-05

## 2024-02-01 RX ORDER — LOSARTAN POTASSIUM 100 MG/1
1 TABLET, FILM COATED ORAL
Qty: 30 | Refills: 0
Start: 2024-02-01 | End: 2024-03-01

## 2024-02-01 RX ORDER — MAGNESIUM OXIDE 400 MG ORAL TABLET 241.3 MG
400 TABLET ORAL ONCE
Refills: 0 | Status: COMPLETED | OUTPATIENT
Start: 2024-02-01 | End: 2024-02-01

## 2024-02-01 RX ADMIN — PANTOPRAZOLE SODIUM 40 MILLIGRAM(S): 20 TABLET, DELAYED RELEASE ORAL at 06:33

## 2024-02-01 RX ADMIN — GABAPENTIN 100 MILLIGRAM(S): 400 CAPSULE ORAL at 06:33

## 2024-02-01 RX ADMIN — GABAPENTIN 100 MILLIGRAM(S): 400 CAPSULE ORAL at 15:26

## 2024-02-01 RX ADMIN — LOSARTAN POTASSIUM 50 MILLIGRAM(S): 100 TABLET, FILM COATED ORAL at 06:33

## 2024-02-01 RX ADMIN — HEPARIN SODIUM 5000 UNIT(S): 5000 INJECTION INTRAVENOUS; SUBCUTANEOUS at 17:38

## 2024-02-01 RX ADMIN — LIDOCAINE 1 PATCH: 4 CREAM TOPICAL at 04:30

## 2024-02-01 RX ADMIN — GABAPENTIN 100 MILLIGRAM(S): 400 CAPSULE ORAL at 21:27

## 2024-02-01 RX ADMIN — CEFTRIAXONE 100 MILLIGRAM(S): 500 INJECTION, POWDER, FOR SOLUTION INTRAMUSCULAR; INTRAVENOUS at 17:39

## 2024-02-01 RX ADMIN — MAGNESIUM OXIDE 400 MG ORAL TABLET 400 MILLIGRAM(S): 241.3 TABLET ORAL at 17:38

## 2024-02-01 RX ADMIN — Medication 2: at 17:36

## 2024-02-01 RX ADMIN — Medication 25 MILLIGRAM(S): at 17:38

## 2024-02-01 RX ADMIN — Medication 25 MILLIGRAM(S): at 06:33

## 2024-02-01 RX ADMIN — Medication 2: at 22:07

## 2024-02-01 RX ADMIN — HEPARIN SODIUM 5000 UNIT(S): 5000 INJECTION INTRAVENOUS; SUBCUTANEOUS at 06:32

## 2024-02-01 NOTE — OCCUPATIONAL THERAPY INITIAL EVALUATION ADULT - PERTINENT HX OF CURRENT PROBLEM, REHAB EVAL
88F with hx of HTN, DM2, recurrent UTIs, osteoarthritis, chronic back pain, lumbar compression fx, recurrent falls c/b Lt hip fx s/p Lt hip hemiarthroplasty (2017), Rt intertrochanteric femoral fx s/p IMF (5/30/20), and Lt femoral periprosthetic fx (5/2023, medically managed), who initially presented at [facility] after mechanical fall at home with negative head-strike c/b Lt hip pain and uncontrolled HTN. Undergoing HTN management and ischemic evaluation.

## 2024-02-01 NOTE — DISCHARGE NOTE PROVIDER - PROVIDER TOKENS
PROVIDER:[TOKEN:[[id number]],FOLLOWUP:[2 weeks]] PROVIDER:[TOKEN:[[id number]],SCHEDULEDAPPT:[02/20/2024]]

## 2024-02-01 NOTE — OCCUPATIONAL THERAPY INITIAL EVALUATION ADULT - GENERAL OBSERVATIONS, REHAB EVAL
Pt's RN Sarah aware of intent to eval/tx; cleared Pt. Pt received EOB w/ breakfast tray - +heplock, tele, bed alarm. Pt agreeable to OT.

## 2024-02-01 NOTE — DISCHARGE NOTE PROVIDER - NSDCFUADDINST_GEN_ALL_CORE_FT
- SEEK IMMEDIATE MEDICAL CARE IF YOU HAVE ANY OF THE FOLLOWING SYMPTOMS: worsening chest pain, racing heart beat, unexplained jaw/neck/back pain, severe abdominal pain, shortness of breath, dizziness or lightheadedness, fainting, sweaty or clammy skin, vomiting, or coughing up blood. These symptoms may represent a serious problem that is an emergency. Do not wait to see if the symptoms will go away. Get medical help right away. Call 911 and do not drive yourself to the hospital.   - SEEK IMMEDIATE MEDICAL CARE IF YOU HAVE ANY OF THE FOLLOWING SYMPTOMS: worsening chest pain, racing heart beat, unexplained jaw/neck/back pain, severe abdominal pain, shortness of breath, dizziness or lightheadedness, fainting, sweaty or clammy skin, vomiting, or coughing up blood. These symptoms may represent a serious problem that is an emergency. Do not wait to see if the symptoms will go away. Get medical help right away. Call 911 and do not drive yourself to the hospital.    Please continue to follow a heart healthy diet, low in sodium, cholesterol and fats. We recommend a Mediterranean diet:  -Incorporate 2 or more servings per day of vegetables, fruits, and whole grains  -Increase intake of fish and legumes/beans to 2 or more servings per week  -Increase intake of healthy fats, such as olive oil and avocados, and have a handful of nuts/seeds most days  -Reduce red/processed meat consumption to 2 or fewer times per week

## 2024-02-01 NOTE — OCCUPATIONAL THERAPY INITIAL EVALUATION ADULT - MD ORDER
S/P Fall at home w/o headstrike or LOC S/P Fall at home w/o head strike or LOC  Hx of recent Lt femoral periprosthetic fx admission in 5/2023 no surgery per Ortho, +med management

## 2024-02-01 NOTE — DISCHARGE NOTE PROVIDER - NSDCFUSCHEDAPPT_GEN_ALL_CORE_FT
Pablito Garrett  [facility]  [facility] [address]  Scheduled Appointment: 02/20/2024

## 2024-02-01 NOTE — DISCHARGE NOTE PROVIDER - NSDCCPCAREPLAN_GEN_ALL_CORE_FT
PRINCIPAL DISCHARGE DIAGNOSIS  Diagnosis: Hypertension  Assessment and Plan of Treatment: - You were admitted to the hospital for Hypertensive emergency, your blood pressure was >200/100s likely because you were in pain after your fall and medication non-comlpiance.   -  You had an Echocardiagram (ultrasound of the heart) which was normal.   - Please continue taking Losartan 50mg daily and Metoprolol 25mg twice a day.   - Please follow up with Dr. Garrett in 2 weeks.   - For blood pressure that is too high or too low please see your doctor or go to the emergency room as necessary.        SECONDARY DISCHARGE DIAGNOSES  Diagnosis: DM2 (diabetes mellitus, type 2)  Assessment and Plan of Treatment: - Your Hemoglobin A1c is 7.1%. This number measures your average blood sugar level over the last three months.  - Please take Metformin 1000mg BID as listed for diabetes.   - Maintain a low carbohydrate, low sugar diet, exercise, monitor your fingerstick blood sugars regarly and follow up with your Endocrinologist/Primary Care Doctor.      Diagnosis: Accident due to mechanical fall without injury  Assessment and Plan of Treatment: - You came to the hospital because you fell while at home. The x-ray done at the hospital was negative a fracture or displacement. Please follow up with your primary care doctor.    Diagnosis: Acute UTI  Assessment and Plan of Treatment: - You were found to have a urinary tract infection and were treated with IV antibiotics. Please take Cefpodoxime 100mg twice a day for 5 days.    Diagnosis: Dog bite  Assessment and Plan of Treatment: - You came in to the hospital and had a dog back. We have cleaned the wound and dressed it. We treated the infection with IV antibiotics. Please take Cefopodoxime 100mg twice a day for 5 days.     PRINCIPAL DISCHARGE DIAGNOSIS  Diagnosis: Hypertension  Assessment and Plan of Treatment: - You were admitted to the hospital for Hypertensive emergency, your blood pressure was >200/100s likely because you were in pain after your fall and medication non-comlpiance.   -  You had an Echocardiagram (ultrasound of the heart) which was normal.   - Please continue taking Losartan 50mg daily. Metoprolol 25mg twice a day, and hydrochlorothiazide 12.5mg daily.   - Please follow up with Dr. Garrett in 2 weeks.   - For blood pressure that is too high or too low please see your doctor or go to the emergency room as necessary.        SECONDARY DISCHARGE DIAGNOSES  Diagnosis: DM2 (diabetes mellitus, type 2)  Assessment and Plan of Treatment: - Your Hemoglobin A1c is 7.1%. This number measures your average blood sugar level over the last three months.  - Please take Metformin 1000mg BID as listed for diabetes.   - Maintain a low carbohydrate, low sugar diet, exercise, monitor your fingerstick blood sugars regarly and follow up with your Endocrinologist/Primary Care Doctor.      Diagnosis: Accident due to mechanical fall without injury  Assessment and Plan of Treatment: - You came to the hospital because you fell while at home. The x-ray done at the hospital was negative a fracture or displacement. Please follow up with your primary care doctor.    Diagnosis: Acute UTI  Assessment and Plan of Treatment: - You were found to have a urinary tract infection and were treated with IV antibiotics. Please take Cefpodoxime 100mg twice a day for 5 days.    Diagnosis: Dog bite  Assessment and Plan of Treatment: - You came in to the hospital and had a dog back. We have cleaned the wound and dressed it. We treated the infection with IV antibiotics. Please take Cefopodoxime 100mg twice a day for 5 days.     PRINCIPAL DISCHARGE DIAGNOSIS  Diagnosis: Hypertension  Assessment and Plan of Treatment: - You were admitted to the hospital for Hypertensive emergency, your blood pressure was >200/100s likely because you were in pain after your fall and medication non-comlpiance.   -  You had an Echocardiagram (ultrasound of the heart) which was normal.   - Please continue taking Losartan 50mg daily. Metoprolol 25mg twice a day, and hydrochlorothiazide 12.5mg daily.   - Please follow up with Dr. Garrett on 2/2024 as scheduled.   - For blood pressure that is too high or too low please see your doctor or go to the emergency room as necessary.        SECONDARY DISCHARGE DIAGNOSES  Diagnosis: DM2 (diabetes mellitus, type 2)  Assessment and Plan of Treatment: - Your Hemoglobin A1c is 7.1%. This number measures your average blood sugar level over the last three months.  - Please take Metformin 1000mg BID as listed for diabetes.   - Maintain a low carbohydrate, low sugar diet, exercise, monitor your fingerstick blood sugars regarly and follow up with your Endocrinologist/Primary Care Doctor.      Diagnosis: Accident due to mechanical fall without injury  Assessment and Plan of Treatment: - You came to the hospital because you fell while at home. The x-ray done at the hospital was negative a fracture or displacement. Please follow up with your primary care doctor.    Diagnosis: Acute UTI  Assessment and Plan of Treatment: - You were found to have a urinary tract infection and were treated with IV antibiotics.    Diagnosis: Dog bite  Assessment and Plan of Treatment: - You came in to the hospital and had a dog back. We have cleaned the wound and dressed it. We treated the infection with IV antibiotics. Please take Cefopodoxime 100mg twice a day for 5 days.     PRINCIPAL DISCHARGE DIAGNOSIS  Diagnosis: Hypertension  Assessment and Plan of Treatment: - You were admitted to the hospital for Hypertensive emergency, your blood pressure was >200/100s likely because you were in pain after your fall and medication non-comlpiance.   -  You had an Echocardiogram (ultrasound of the heart) which was normal.   - Please continue taking Losartan 50mg daily. Metoprolol 25mg twice a day, and hydrochlorothiazide 25 mg daily.   - Please follow up with Dr. Garrett on 2/2024 as scheduled.   - For blood pressure that is too high or too low please see your doctor or go to the emergency room as necessary.        SECONDARY DISCHARGE DIAGNOSES  Diagnosis: DM2 (diabetes mellitus, type 2)  Assessment and Plan of Treatment: - Your Hemoglobin A1c is 7.1%. This number measures your average blood sugar level over the last three months.  - Please take Metformin 1000mg BID as listed for diabetes.   - Maintain a low carbohydrate, low sugar diet, exercise, monitor your fingerstick blood sugars regarly and follow up with your Endocrinologist/Primary Care Doctor.      Diagnosis: Accident due to mechanical fall without injury  Assessment and Plan of Treatment: - You came to the hospital because you fell while at home. The x-ray done at the hospital was negative a fracture or displacement. Please follow up with your primary care doctor.    Diagnosis: Acute UTI  Assessment and Plan of Treatment: - You were found to have a urinary tract infection and were treated with IV antibiotics. You were transitioned to oral antibiotics called cefpodoxime 100 mg twice daily for the remainder four days.    Diagnosis: Dog bite  Assessment and Plan of Treatment: - You came in to the hospital and had a dog back. We have cleaned the wound and dressed it. We treated the infection with IV antibiotics. Please take Cefopodoxime 100mg twice a day for 4 days.

## 2024-02-01 NOTE — DISCHARGE NOTE PROVIDER - HOSPITAL COURSE
**incomplete**     88F with hx of HTN, DM2, recurrent UTIs, osteoarthritis, chronic back pain, lumbar compression fx, recurrent falls c/b Lt hip fx s/p Lt hip hemiarthroplasty (2017), Rt intertrochanteric femoral fx s/p IMF (5/30/20), and Lt femoral periprosthetic fx (5/2023, medically managed), who initially presented at [facility] after mechanical fall at home with negative headstrike c/b Lt hip pain and uncontrolled HTN. Undergoing HTN management and ischemic evaluation. **incomplete**     88F with hx of HTN, DM2, recurrent UTIs, osteoarthritis, chronic back pain, lumbar compression fx, recurrent falls c/b Lt hip fx s/p Lt hip hemiarthroplasty (2017), Rt intertrochanteric femoral fx s/p IMF (5/30/20), and Lt femoral periprosthetic fx (5/2023, medically managed), who initially presented at [facility] after mechanical fall at home with negative headstrike c/b Lt hip pain and uncontrolled HTN. EKG remarkable for new TWI in V4-6 and JESSICA in aVR compared to previous EKG in May 2023.  Undergoing HTN management and ischemic evaluation.     During admission patient's troponin initially 38 -> 35 with negative CK/CKMB.  TTE 01/31/24: LVEF 55-60%, no RWMA, G1DD. Patient started on Losartan 50mg daily and Toprol XL 25mg BID. Patient's course also notable for +UA which is being treated with CTX 1g IV and dog bite on L hand which was cleansed and also being txed w/ CTX.  Patient was evaluated by PT/OT and recommended for ___.     No significant events on telemetry overnight. Repeat EKG without ischemic changes. Patient has been medically cleared for discharge as per Dr. Gautam.  Patient has been given appropriate discharge instructions including medication regimen and follow up. Medications that patient needs refills on have been e-prescribed to preferred pharmacy.     Temp HR BP RR SpO2  Gen: NAD, A&O x3  Cards: RRR, clear S1 and S2 without murmur  Pulm: CTA B/L without w/r/r  Right __: No hematoma or ooze, peripheral pulses 2+ B/L  Abd: soft, NT  Ext: no LE edema or ulcerations B/L     88F with hx of HTN, DM2, recurrent UTIs, osteoarthritis, chronic back pain, lumbar compression fx, recurrent falls c/b Lt hip fx s/p Lt hip hemiarthroplasty (2017), Rt intertrochanteric femoral fx s/p IMF (5/30/20), and Lt femoral periprosthetic fx (5/2023, medically managed), who initially presented at [facility] after mechanical fall at home with negative headstrike c/b Lt hip pain and uncontrolled HTN. EKG remarkable for new TWI in V4-6 and JESSICA in aVR compared to previous EKG in May 2023.  Undergoing HTN management and ischemic evaluation.     During admission patient's troponin initially 38 -> 35 with negative CK/CKMB.  TTE 01/31/24: LVEF 55-60%, no RWMA, G1DD. Patient started on Losartan 50mg daily and Toprol XL 25mg BID. Patient's course also notable for +UA which is being treated with CTX 1g IV and dog bite on L hand which was cleansed and also being txed w/ CTX.  Patient to be discharged on Cefpodoxime 100mg BID x5 days. Patient was evaluated by PT and recommended for EMELY however patient refusing. PT/OT recommended for recommended for ___.  Patient requires increased HHA at home given recurrent fails and debility. Patient was educated to call HHA agency to obtain more assistance will likely need HHA x12hrs a day/7days a week as patient lives along and needs assistance with daily living.     No significant events on telemetry overnight. Repeat EKG without ischemic changes. Patient has been medically cleared for discharge as per Dr. Gautam.  Patient has been given appropriate discharge instructions including medication regimen and follow up. Medications that patient needs refills on have been e-prescribed to preferred pharmacy.     Gen: NAD, A&O x3  Cards: RRR, clear S1 and S2 without murmur  Pulm: CTA B/L without w/r/r  Abd: soft, NT  Ext: no LE edema or ulcerations B/L     88F with hx of HTN, DM2, recurrent UTIs, osteoarthritis, chronic back pain, lumbar compression fx, recurrent falls c/b Lt hip fx s/p Lt hip hemiarthroplasty (2017), Rt intertrochanteric femoral fx s/p IMF (5/30/20), and Lt femoral periprosthetic fx (5/2023, medically managed), who initially presented at [facility] after mechanical fall at home with negative headstrike c/b Lt hip pain and uncontrolled HTN. EKG remarkable for new TWI in V4-6 and JESSICA in aVR compared to previous EKG in May 2023.  Undergoing HTN management and ischemic evaluation.     During admission patient's troponin initially 38 -> 35 with negative CK/CKMB.  TTE 01/31/24: LVEF 55-60%, no RWMA, G1DD. Patient started on Losartan 50mg daily and Toprol XL 25mg BID. Patient's course also notable for +UA which is being treated with CTX 1g IV and dog bite on L hand which was cleansed and also being txed w/ CTX.  Patient to be discharged on Cefpodoxime 100mg BID x5 days. Patient was evaluated by PT and recommended for EMELY however patient refusing. PT/OT recommended for recommended for home needs.  Patient requires increased HHA at home given recurrent fails and debility. Patient was educated to call HHA agency to obtain more assistance will likely need HHA x24hrs a day/7days a week as patient lives along and needs assistance with daily living.     No significant events on telemetry overnight. Repeat EKG without ischemic changes. Patient has been medically cleared for discharge as per Dr. Gautam.  Patient has been given appropriate discharge instructions including medication regimen and follow up. Medications that patient needs refills on have been e-prescribed to preferred pharmacy.     Gen: NAD, A&O x3  Cards: RRR, clear S1 and S2 without murmur  Pulm: CTA B/L without w/r/r  Abd: soft, NT  Ext: no LE edema or ulcerations B/L     88F with hx of HTN, DM2, recurrent UTIs, osteoarthritis, chronic back pain, lumbar compression fx, recurrent falls c/b Lt hip fx s/p Lt hip hemiarthroplasty (2017), Rt intertrochanteric femoral fx s/p IMF (5/30/20), and Lt femoral periprosthetic fx (5/2023, medically managed), who initially presented at [facility] after mechanical fall at home with negative headstrike c/b Lt hip pain and uncontrolled HTN. EKG remarkable for new TWI in V4-6 and JESSICA in aVR compared to previous EKG in May 2023.  Undergoing HTN management and ischemic evaluation.     During admission patient's troponin initially 38 -> 35 with negative CK/CKMB.  TTE 01/31/24: LVEF 55-60%, no RWMA, G1DD. Patient started on Losartan 50mg daily and Toprol XL 25mg BID. Patient's course also notable for +UA which is being treated with CTX 1g IV and dog bite on L hand which was cleansed and also being txed w/ CTX.  Patient to be discharged on Cefpodoxime 100mg BID x5 days. Patient was evaluated by PT and recommended for EMELY however patient refusing. PT/OT recommended for home needs and patient will require increased HHA at home given recurrent fails and debility. Patient was educated to call HHA agency to obtain more assistance will likely need HHA x24hrs a day/7days a week as patient lives along and needs assistance with daily living.  On day of discharge, pt's daughter states that she would prefer EMELY _____.     No significant events on telemetry overnight. Repeat EKG without ischemic changes. Patient has been medically cleared for discharge as per Dr. Gautam.  Patient has been given appropriate discharge instructions including medication regimen and follow up. Medications that patient needs refills on have been e-prescribed to preferred pharmacy.     Gen: NAD, A&O x3  Cards: RRR, clear S1 and S2 without murmur  Pulm: CTA B/L without w/r/r  Abd: soft, NT  Ext: no LE edema or ulcerations B/L     88F with hx of HTN, DM2, recurrent UTIs, osteoarthritis, chronic back pain, lumbar compression fx, recurrent falls c/b Lt hip fx s/p Lt hip hemiarthroplasty (2017), Rt intertrochanteric femoral fx s/p IMF (5/30/20), and Lt femoral periprosthetic fx (5/2023, medically managed), who initially presented at [facility] after mechanical fall at home with negative headstrike c/b Lt hip pain and uncontrolled HTN. EKG remarkable for new TWI in V4-6 and JESSICA in aVR compared to previous EKG in May 2023.  Undergoing HTN management and ischemic evaluation.     During admission patient's troponin initially 38 -> 35 with negative CK/CKMB.  TTE 01/31/24: LVEF 55-60%, no RWMA, G1DD. Patient started on Losartan 50mg daily and Toprol XL 25mg BID. Patient's course also notable for +UA which is being treated with CTX 1g IV x3days and dog bite on L hand which was cleansed and also being txed w/ CTX.  Patient to be discharged on Cefpodoxime 100mg BID x5 days. Patient was evaluated by PT and recommended for EMELY; initially pt was refusing and requested dc home which would require increased HHA. Pt now amenable to EMELY as discussed with pt and daughter.     No significant events on telemetry overnight. Repeat EKG without ischemic changes. Patient has been medically cleared for discharge as per Dr. Gautam.  Patient has been given appropriate discharge instructions including medication regimen and follow up. Medications that patient needs refills on have been e-prescribed to preferred pharmacy.      88F with hx of HTN, DM2, recurrent UTIs, osteoarthritis, chronic back pain, lumbar compression fx, recurrent falls c/b Lt hip fx s/p Lt hip hemiarthroplasty (2017), Rt intertrochanteric femoral fx s/p IMF (5/30/20), and Lt femoral periprosthetic fx (5/2023, medically managed), who initially presented at [facility] after mechanical fall at home with negative headstrike c/b Lt hip pain and uncontrolled HTN. EKG remarkable for new TWI in V4-6 and JESSICA in aVR compared to previous EKG in May 2023.  Undergoing HTN management and ischemic evaluation.     During admission patient's troponin initially 38 -> 35 with negative CK/CKMB.  TTE 01/31/24: LVEF 55-60%, no RWMA, G1DD. Patient started on Losartan 50mg daily and Toprol XL 25mg BID. Patient's course also notable for +UA which is being treated with CTX 1g IV x3days and dog bite on L hand which was cleansed and also being txed w/ CTX.  Patient to be discharged on Cefpodoxime 100mg BID x5 days. BP remained not at goal during admission thus she will initiate HCTZ 12.5mg PO QD on discharge as well.  Patient was evaluated by PT and initially recommended for home care with increased HHA however upon reassessment recommended EMELY.     No significant events on telemetry overnight. Repeat EKG without ischemic changes. Patient has been medically cleared for discharge as per Dr. Gautam.  Patient has been given appropriate discharge instructions including medication regimen and follow up. Medications that patient needs refills on have been e-prescribed to preferred pharmacy.     Cardiac discharge medications: Losartan 50mg QD, Toprol 25mg BID, Hydrochlorothiazide 12.5mg QD.     Pt discharge copies detail cardiovascular history, medications, testing/treatments, OR patient has created a portal account and instructed to provide their records at their 1st appointment.       88F with hx of HTN, DM2, recurrent UTIs, osteoarthritis, chronic back pain, lumbar compression fx, recurrent falls c/b Lt hip fx s/p Lt hip hemiarthroplasty (2017), Rt intertrochanteric femoral fx s/p IMF (5/30/20), and Lt femoral periprosthetic fx (5/2023, medically managed), who initially presented at [facility] after mechanical fall at home with negative headstrike c/b Lt hip pain and uncontrolled HTN. EKG remarkable for new TWI in V4-6 and JESSICA in aVR compared to previous EKG in May 2023.  Undergoing HTN management and ischemic evaluation.     During admission patient's troponin initially 38 -> 35 with negative CK/CKMB.  TTE 01/31/24: LVEF 55-60%, no RWMA, G1DD. Patient started on Losartan 50mg daily and Toprol XL 25mg BID. Patient's course also notable for +UA which is being treated with CTX 1g IV x3days and dog bite on L hand which was cleansed and also being txed w/ CTX.  Patient to be discharged on Cefpodoxime 100mg BID x5 days. BP remained not at goal during admission thus she will initiate HCTZ 12.5mg PO QD on discharge as well.  Patient was evaluated by PT and initially recommended for home care with increased HHA however upon reassessment recommended EMELY.     No significant events on telemetry overnight. Repeat EKG without ischemic changes. Patient has been medically cleared for discharge as per Dr. Gautam.  Patient has been given appropriate discharge instructions including medication regimen and follow up. Medications that patient needs refills on have been e-prescribed to preferred pharmacy. Pt will follow up with her PCP Dr. Tami Kenney on 2/20/24 as scheduled.     Cardiac discharge medications: Losartan 50mg QD, Toprol 25mg BID, Hydrochlorothiazide 12.5mg QD.     Pt discharge copies detail cardiovascular history, medications, testing/treatments, OR patient has created a portal account and instructed to provide their records at their 1st appointment.       88F with hx of HTN, DM2, recurrent UTIs, osteoarthritis, chronic back pain, lumbar compression fx, recurrent falls c/b Lt hip fx s/p Lt hip hemiarthroplasty (2017), Rt intertrochanteric femoral fx s/p IMF (5/30/20), and Lt femoral periprosthetic fx (5/2023, medically managed), who initially presented at [facility] after mechanical fall at home with negative headstrike c/b Lt hip pain and uncontrolled HTN. EKG remarkable for new TWI in V4-6 and JESSICA in aVR compared to previous EKG in May 2023.  Undergoing HTN management and ischemic evaluation.     During admission patient's troponin initially 38 -> 35 with negative CK/CKMB.  TTE 01/31/24: LVEF 55-60%, no RWMA, G1DD. Patient started on Losartan 50mg daily and Toprol XL 25mg BID. Patient's course also notable for +UA which is being treated with CTX 1g IV x3days and dog bite on L hand which was cleansed and also being txed w/ IV CTX x3 days.  BP remained not at goal during admission thus she will initiate HCTZ 12.5mg PO QD on discharge as well.  Patient was evaluated by PT and initially recommended for home care with increased HHA however upon reassessment recommended EMELY.     No significant events on telemetry overnight. Repeat EKG without ischemic changes. Patient has been medically cleared for discharge as per Dr. Gautam.  Patient has been given appropriate discharge instructions including medication regimen and follow up. Medications that patient needs refills on have been e-prescribed to preferred pharmacy. Pt will follow up with her PCP Dr. Tami Kenney on 2/20/24 as scheduled.     Cardiac discharge medications: Losartan 50mg QD, Toprol 25mg BID, Hydrochlorothiazide 12.5mg QD.     Pt discharge copies detail cardiovascular history, medications, testing/treatments, OR patient has created a portal account and instructed to provide their records at their 1st appointment.       88F with hx of HTN, DM2, recurrent UTIs, osteoarthritis, chronic back pain, lumbar compression fx, recurrent falls c/b Lt hip fx s/p Lt hip hemiarthroplasty (2017), Rt intertrochanteric femoral fx s/p IMF (5/30/20), and Lt femoral periprosthetic fx (5/2023, medically managed), who initially presented at [facility] after mechanical fall at home with negative headstrike c/b Lt hip pain and uncontrolled HTN. EKG remarkable for new TWI in V4-6 and JESSICA in aVR compared to previous EKG in May 2023.  Undergoing HTN management and ischemic evaluation.     During admission patient's troponin initially 38 -> 35 with negative CK/CKMB.  TTE 01/31/24: LVEF 55-60%, no RWMA, G1DD. Patient started on Losartan 50mg daily and Toprol XL 25mg BID. Patient's course also notable for +UA which is being treated with CTX 1g IV x3days and dog bite on L hand which was cleansed and also being txed w/ IV CTX x3 days.  Urine Cx positive for klebsiella pneumoniae in which CTX switched to BP remained not at goal during admission thus she will initiate HCTZ 12.5mg PO QD on discharge as well.  Patient was evaluated by PT and initially recommended for home care with increased HHA however upon reassessment recommended EMELY.     No significant events on telemetry overnight. Repeat EKG without ischemic changes. Patient has been medically cleared for discharge as per Dr. Gautam.  Patient has been given appropriate discharge instructions including medication regimen and follow up. Medications that patient needs refills on have been e-prescribed to preferred pharmacy. Pt will follow up with her PCP Dr. Tami Kenney on 2/20/24 as scheduled.     Cardiac discharge medications: Losartan 50mg QD, Toprol 25mg BID, Hydrochlorothiazide 12.5mg QD.     Pt discharge copies detail cardiovascular history, medications, testing/treatments, OR patient has created a portal account and instructed to provide their records at their 1st appointment.       88F with hx of HTN, DM2, recurrent UTIs, osteoarthritis, chronic back pain, lumbar compression fx, recurrent falls c/b Lt hip fx s/p Lt hip hemiarthroplasty (2017), Rt intertrochanteric femoral fx s/p IMF (5/30/20), and Lt femoral periprosthetic fx (5/2023, medically managed), who initially presented at [facility] after mechanical fall at home with negative headstrike c/b Lt hip pain and uncontrolled HTN. EKG remarkable for new TWI in V4-6 and JESSICA in aVR compared to previous EKG in May 2023.  Undergoing HTN management and ischemic evaluation.     During admission patient's troponin initially 38 -> 35 with negative CK/CKMB.  TTE 01/31/24: LVEF 55-60%, no RWMA, G1DD. Patient started on Losartan 50mg daily and Toprol XL 25mg BID. Patient's course also notable for +UA which is being treated with CTX 1g IV x3days and dog bite on L hand which was cleansed and also being txed w/ IV CTX x3 days.  Urine Cx positive for klebsiella pneumoniae in which CTX switched to cefuroxime 100 mg BID x 4 days to finish antibiotic course. BP remained not at goal during admission thus she will initiate HCTZ 12.5mg PO QD on discharge as well.  Patient was evaluated by PT and initially recommended for home care with increased HHA however upon reassessment recommended EMELY.     No significant events on telemetry overnight. Repeat EKG without ischemic changes. Patient has been medically cleared for discharge as per Dr. Gautam.  Patient has been given appropriate discharge instructions including medication regimen and follow up. Medications that patient needs refills on have been e-prescribed to preferred pharmacy. Pt will follow up with her PCP Dr. Tami Kenney on 2/20/24 as scheduled.     Cardiac discharge medications: Losartan 50mg QD, Toprol 25mg BID, Hydrochlorothiazide 12.5mg QD.     Pt discharge copies detail cardiovascular history, medications, testing/treatments, OR patient has created a portal account and instructed to provide their records at their 1st appointment.       88F with hx of HTN, DM2, recurrent UTIs, osteoarthritis, chronic back pain, lumbar compression fx, recurrent falls c/b Lt hip fx s/p Lt hip hemiarthroplasty (2017), Rt intertrochanteric femoral fx s/p IMF (5/30/20), and Lt femoral periprosthetic fx (5/2023, medically managed), who initially presented at [facility] after mechanical fall at home with negative headstrike c/b Lt hip pain and uncontrolled HTN. EKG remarkable for new TWI in V4-6 and JESSICA in aVR compared to previous EKG in May 2023.  Undergoing HTN management and ischemic evaluation.     During admission patient's troponin initially 38 -> 35 with negative CK/CKMB.  TTE 01/31/24: LVEF 55-60%, no RWMA, G1DD. Patient started on Losartan 50mg daily and Toprol XL 25mg BID. Patient's course also notable for +UA which is being treated with CTX 1g IV x3days and dog bite on L hand which was cleansed and also being txed w/ IV CTX x3 days.  Urine Cx positive for klebsiella pneumoniae in which CTX switched to cefpodoxime 100 mg BID x 4 days to finish antibiotic course. BP remained not at goal during admission thus initiated on HCTZ 25 mg PO QD on discharge as well.  Patient was evaluated by PT and initially recommended for home care with increased HHA however upon reassessment recommended EMELY.     No significant events on telemetry overnight. Repeat EKG without ischemic changes. Patient has been medically cleared for discharge as per Dr. Gautam.  Patient has been given appropriate discharge instructions including medication regimen and follow up. Medications that patient needs refills on have been e-prescribed to preferred pharmacy. Pt will follow up with her PCP Dr. Tami Kenney on 2/20/24 as scheduled.     Cardiac discharge medications: Losartan 50mg QD, Toprol 25mg BID, Hydrochlorothiazide 25 mg QD. Cefpodoxime 100 mg BID    Pt discharge copies detail cardiovascular history, medications, testing/treatments, OR patient has created a portal account and instructed to provide their records at their 1st appointment.       88F with hx of HTN, DM2, recurrent UTIs, osteoarthritis, chronic back pain, lumbar compression fx, recurrent falls c/b Lt hip fx s/p Lt hip hemiarthroplasty (2017), Rt intertrochanteric femoral fx s/p IMF (5/30/20), and Lt femoral periprosthetic fx (5/2023, medically managed), who initially presented at [facility] after mechanical fall at home with negative headstrike c/b Lt hip pain and uncontrolled HTN. EKG remarkable for new TWI in V4-6 and JESSICA in aVR compared to previous EKG in May 2023.  Undergoing HTN management and ischemic evaluation.     During admission patient's troponin initially 38 -> 35 with negative CK/CKMB.  TTE 01/31/24: LVEF 55-60%, no RWMA, G1DD. Patient started on Losartan 50mg daily and Toprol XL 25mg BID. Patient's course also notable for +UA which is being treated with CTX 1g IV x3days and dog bite on L hand which was cleansed and also being txed w/ IV CTX x3 days.  Urine Cx positive for klebsiella pneumoniae in which CTX switched to cefpodoxime 100 mg BID x 4 days to finish antibiotic course. BP remained not at goal during admission thus initiated on HCTZ 25 mg PO QD on discharge as well.  Patient was evaluated by PT and initially recommended for home care with increased HHA however upon reassessment recommended EMELY.     No significant events on telemetry overnight. Repeat EKG without ischemic changes. Patient has been medically cleared for discharge as per Dr. Gautam.  Patient has been given appropriate discharge instructions including medication regimen and follow up. Medications that patient needs refills on have been e-prescribed to preferred pharmacy. Pt will follow up with her PCP Dr. Tami Kenney on 2/20/24 as scheduled.     Cardiac discharge medications: Losartan 50mg QD, Toprol 25mg BID, Hydrochlorothiazide 25 mg QD. Cefpodoxime 100 mg BID x 4 days     Pt discharge copies detail cardiovascular history, medications, testing/treatments, OR patient has created a portal account and instructed to provide their records at their 1st appointment.

## 2024-02-01 NOTE — DISCHARGE NOTE PROVIDER - NSDCMRMEDTOKEN_GEN_ALL_CORE_FT
acetaminophen 325 mg oral tablet: 2 tab(s) orally every 6 hours  bisacodyl 10 mg rectal suppository: 1 suppository(ies) rectal once a day, As needed, If no bowel movement  coloplast barrier cream: Apply topically to affected area 1 to 3 times a day     Disp: 2 standard tubes  cyclobenzaprine 5 mg oral tablet: 1 tab(s) orally 3 times a day as needed for  muscle spasm  gabapentin 100 mg oral capsule: 1 cap(s) orally 3 times a day  ketorolac 10 mg oral tablet: 1 tab(s) orally every 8 hours as needed for Moderate Pain (4 - 6)  Lidocare Pain Relief Patch 4% topical film: Apply topically to affected area once a day as needed for left hip pain  metFORMIN 1000 mg oral tablet: 1 tab(s) orally 2 times a day  oxyCODONE 5 mg oral tablet: 1 tab(s) orally every 6 hours as needed for , Severe Pain (7 - 10)  pantoprazole 40 mg oral delayed release tablet: 1 tab(s) orally once a day (before a meal)   acetaminophen 325 mg oral tablet: 2 tab(s) orally every 6 hours  bisacodyl 10 mg rectal suppository: 1 suppository(ies) rectal once a day, As needed, If no bowel movement  cefpodoxime 100 mg oral tablet: 1 tab(s) orally 2 times a day  coloplast barrier cream: Apply topically to affected area 1 to 3 times a day     Disp: 2 standard tubes  cyclobenzaprine 5 mg oral tablet: 1 tab(s) orally 3 times a day as needed for  muscle spasm  gabapentin 100 mg oral capsule: 1 cap(s) orally 3 times a day  ketorolac 10 mg oral tablet: 1 tab(s) orally every 8 hours as needed for Moderate Pain (4 - 6)  Lidocare Pain Relief Patch 4% topical film: Apply topically to affected area once a day as needed for left hip pain  losartan 50 mg oral tablet: 1 tab(s) orally once a day  metFORMIN 1000 mg oral tablet: 1 tab(s) orally 2 times a day  metoprolol succinate 25 mg oral tablet, extended release: 1 tab(s) orally 2 times a day  oxyCODONE 5 mg oral tablet: 1 tab(s) orally every 6 hours as needed for , Severe Pain (7 - 10)  pantoprazole 40 mg oral delayed release tablet: 1 tab(s) orally once a day (before a meal)   acetaminophen 325 mg oral tablet: 2 tab(s) orally every 6 hours  bisacodyl 10 mg rectal suppository: 1 suppository(ies) rectal once a day, As needed, If no bowel movement  cefpodoxime 100 mg oral tablet: 1 tab(s) orally 2 times a day  coloplast barrier cream: Apply topically to affected area 1 to 3 times a day     Disp: 2 standard tubes  cyclobenzaprine 5 mg oral tablet: 1 tab(s) orally 3 times a day as needed for  muscle spasm  gabapentin 100 mg oral capsule: 1 cap(s) orally 3 times a day  hydroCHLOROthiazide 12.5 mg oral tablet: 1 tab(s) orally once a day  ketorolac 10 mg oral tablet: 1 tab(s) orally every 8 hours as needed for Moderate Pain (4 - 6)  Lidocare Pain Relief Patch 4% topical film: Apply topically to affected area once a day as needed for left hip pain  losartan 50 mg oral tablet: 1 tab(s) orally once a day  metFORMIN 1000 mg oral tablet: 1 tab(s) orally 2 times a day  metoprolol succinate 25 mg oral tablet, extended release: 1 tab(s) orally 2 times a day  oxyCODONE 5 mg oral tablet: 1 tab(s) orally every 6 hours as needed for , Severe Pain (7 - 10)  pantoprazole 40 mg oral delayed release tablet: 1 tab(s) orally once a day (before a meal)   acetaminophen 325 mg oral tablet: 2 tab(s) orally every 6 hours  bisacodyl 10 mg rectal suppository: 1 suppository(ies) rectal once a day, As needed, If no bowel movement  cefpodoxime 100 mg oral tablet: 1 tab(s) orally 2 times a day  gabapentin 100 mg oral capsule: 1 cap(s) orally 3 times a day  hydroCHLOROthiazide 25 mg oral tablet: 1 tab(s) orally once a day  ketorolac 10 mg oral tablet: 1 tab(s) orally every 8 hours as needed for Moderate Pain (4 - 6)  losartan 50 mg oral tablet: 1 tab(s) orally once a day  metFORMIN 1000 mg oral tablet: 1 tab(s) orally 2 times a day  metoprolol succinate 25 mg oral tablet, extended release: 1 tab(s) orally 2 times a day  oxyCODONE 5 mg oral tablet: 1 tab(s) orally every 6 hours as needed for , Severe Pain (7 - 10)  pantoprazole 40 mg oral delayed release tablet: 1 tab(s) orally once a day (before a meal)

## 2024-02-01 NOTE — DISCHARGE NOTE PROVIDER - CARE PROVIDER_API CALL
Pablito Garrett  Internal Medicine  [address], [place] [address]  Phone: [phone]  Fax: [phone]  Follow Up Time: 2 weeks   Pablito Garrett  Internal Medicine  [address] [place] [address], [place] [address]  Phone: [phone]  Fax: [phone]  Scheduled Appointment: 02/20/2024

## 2024-02-01 NOTE — OCCUPATIONAL THERAPY INITIAL EVALUATION ADULT - ADDITIONAL COMMENTS
Pt lives in apt w/ elevator access. Pt states that she was mostly independent, had home health aid assist x5 hours x 5 days. Patient has a wheelchair and rollator.

## 2024-02-02 LAB
ALBUMIN SERPL ELPH-MCNC: 3.4 G/DL — SIGNIFICANT CHANGE UP (ref 3.3–5)
ALP SERPL-CCNC: 85 U/L — SIGNIFICANT CHANGE UP (ref 40–120)
ALT FLD-CCNC: 6 U/L — LOW (ref 10–45)
ANION GAP SERPL CALC-SCNC: 9 MMOL/L — SIGNIFICANT CHANGE UP (ref 5–17)
AST SERPL-CCNC: 14 U/L — SIGNIFICANT CHANGE UP (ref 10–40)
BILIRUB SERPL-MCNC: 0.3 MG/DL — SIGNIFICANT CHANGE UP (ref 0.2–1.2)
BUN SERPL-MCNC: 22 MG/DL — SIGNIFICANT CHANGE UP (ref 7–23)
CALCIUM SERPL-MCNC: 8.9 MG/DL — SIGNIFICANT CHANGE UP (ref 8.4–10.5)
CHLORIDE SERPL-SCNC: 104 MMOL/L — SIGNIFICANT CHANGE UP (ref 96–108)
CO2 SERPL-SCNC: 24 MMOL/L — SIGNIFICANT CHANGE UP (ref 22–31)
CREAT SERPL-MCNC: 0.96 MG/DL — SIGNIFICANT CHANGE UP (ref 0.5–1.3)
EGFR: 58 ML/MIN/1.73M2 — LOW
GLUCOSE BLDC GLUCOMTR-MCNC: 120 MG/DL — HIGH (ref 70–99)
GLUCOSE BLDC GLUCOMTR-MCNC: 127 MG/DL — HIGH (ref 70–99)
GLUCOSE BLDC GLUCOMTR-MCNC: 142 MG/DL — HIGH (ref 70–99)
GLUCOSE BLDC GLUCOMTR-MCNC: 159 MG/DL — HIGH (ref 70–99)
GLUCOSE SERPL-MCNC: 191 MG/DL — HIGH (ref 70–99)
HCT VFR BLD CALC: 32.3 % — LOW (ref 34.5–45)
HGB BLD-MCNC: 10.5 G/DL — LOW (ref 11.5–15.5)
MAGNESIUM SERPL-MCNC: 2 MG/DL — SIGNIFICANT CHANGE UP (ref 1.6–2.6)
MCHC RBC-ENTMCNC: 28.3 PG — SIGNIFICANT CHANGE UP (ref 27–34)
MCHC RBC-ENTMCNC: 32.5 GM/DL — SIGNIFICANT CHANGE UP (ref 32–36)
MCV RBC AUTO: 87.1 FL — SIGNIFICANT CHANGE UP (ref 80–100)
NRBC # BLD: 0 /100 WBCS — SIGNIFICANT CHANGE UP (ref 0–0)
PLATELET # BLD AUTO: 239 K/UL — SIGNIFICANT CHANGE UP (ref 150–400)
POTASSIUM SERPL-MCNC: 4.5 MMOL/L — SIGNIFICANT CHANGE UP (ref 3.5–5.3)
POTASSIUM SERPL-SCNC: 4.5 MMOL/L — SIGNIFICANT CHANGE UP (ref 3.5–5.3)
PROT SERPL-MCNC: 5.9 G/DL — LOW (ref 6–8.3)
RBC # BLD: 3.71 M/UL — LOW (ref 3.8–5.2)
RBC # FLD: 17.2 % — HIGH (ref 10.3–14.5)
SODIUM SERPL-SCNC: 137 MMOL/L — SIGNIFICANT CHANGE UP (ref 135–145)
WBC # BLD: 7.98 K/UL — SIGNIFICANT CHANGE UP (ref 3.8–10.5)
WBC # FLD AUTO: 7.98 K/UL — SIGNIFICANT CHANGE UP (ref 3.8–10.5)

## 2024-02-02 RX ORDER — HYDROCHLOROTHIAZIDE 25 MG
1 TABLET ORAL
Qty: 30 | Refills: 0
Start: 2024-02-02 | End: 2024-03-02

## 2024-02-02 RX ADMIN — Medication 25 MILLIGRAM(S): at 06:53

## 2024-02-02 RX ADMIN — GABAPENTIN 100 MILLIGRAM(S): 400 CAPSULE ORAL at 06:14

## 2024-02-02 RX ADMIN — HEPARIN SODIUM 5000 UNIT(S): 5000 INJECTION INTRAVENOUS; SUBCUTANEOUS at 06:13

## 2024-02-02 RX ADMIN — GABAPENTIN 100 MILLIGRAM(S): 400 CAPSULE ORAL at 14:42

## 2024-02-02 RX ADMIN — Medication 25 MILLIGRAM(S): at 19:14

## 2024-02-02 RX ADMIN — CEFTRIAXONE 100 MILLIGRAM(S): 500 INJECTION, POWDER, FOR SOLUTION INTRAMUSCULAR; INTRAVENOUS at 17:16

## 2024-02-02 RX ADMIN — LOSARTAN POTASSIUM 50 MILLIGRAM(S): 100 TABLET, FILM COATED ORAL at 06:14

## 2024-02-02 RX ADMIN — PANTOPRAZOLE SODIUM 40 MILLIGRAM(S): 20 TABLET, DELAYED RELEASE ORAL at 06:14

## 2024-02-02 RX ADMIN — GABAPENTIN 100 MILLIGRAM(S): 400 CAPSULE ORAL at 22:57

## 2024-02-02 RX ADMIN — Medication 2: at 22:57

## 2024-02-02 RX ADMIN — HEPARIN SODIUM 5000 UNIT(S): 5000 INJECTION INTRAVENOUS; SUBCUTANEOUS at 19:14

## 2024-02-02 NOTE — PROGRESS NOTE ADULT - PROBLEM SELECTOR PLAN 1
Presented w/ /100s likely 2/2 pain and non-compliance w/ meds. SBP now improved to 130s-140s  - TTE 01/31/24: LVEF 55-60%, no RWMA, G1DD  - Trop peaked to 35 w/ negative CK/CKMB, EKG stable CP free  - Continue Losartan 50mg daily, Toprol 25mg BID, HCTZ 12.5mg PO QD
Presented w/ /100s likely 2/2 pain and non-compliance w/ meds. SBP now improved to 130s-140s  - C/w Losartan 50mg daily and Toprol 25mg BID   - TTE 01/31/24:  LVEF 55-60%, no RWMA, G1DD  - Trop peaked to 35 w/ negative CK/CKMB, EKG stable CP free

## 2024-02-02 NOTE — PROGRESS NOTE ADULT - PROBLEM SELECTOR PLAN 2
Pelvic XR (1/31/24): negative for acute fx/displacement   - Pain management: Tylenol 650mg q6hrs + Lidocaine patch    # Hx of recent Lt femoral periprosthetic fx admission in 5/2023 -- medical management and not deemed as acute surgical candidate per Ortho notes  - Pain management as above  - Fall risk protocol
Pelvic XR (1/31/24): negative for acute fx/displacement   - Pain management: Tylenol 650mg q6hrs + Lidocaine patch    # Hx of recent Lt femoral periprosthetic fx admission in 5/2023 -- medical management and not deemed as acute surgical candidate per Ortho notes  - Pain management as above  - Fall risk protocol

## 2024-02-02 NOTE — PROGRESS NOTE ADULT - ASSESSMENT
88F with hx of HTN, DM2, recurrent UTIs, OA, chronic back pain, lumbar compression fx, recurrent falls c/b LT hip fx s/p LT hip hemiarthroplasty (2017), RT intertrochanteric femoral fx s/p IMF (5/30/20), and LT femoral periprosthetic fx (5/2023, medically managed), who initially presented at [facility] after mechanical fall at home with negative head strike, c/b LT hip pain and uncontrolled HTN. Patient now admitted to [facility] for further management of HTN, now optimized on oral HTNsive regimen. Patient medically stable and plan for discharge to [facility] in AM. 
88F with hx of HTN, DM2, recurrent UTIs, osteoarthritis, chronic back pain, lumbar compression fx, recurrent falls c/b Lt hip fx s/p Lt hip hemiarthroplasty (2017), Rt intertrochanteric femoral fx s/p IMF (5/30/20), and Lt femoral periprosthetic fx (5/2023, medically managed), who initially presented at [facility] after mechanical fall at home with negative headstrike c/b Lt hip pain and uncontrolled HTN. Patient admitted for further management of HTN. Patient medically stable and plan for discharge after discussing with family plan for [facility] vs home.

## 2024-02-02 NOTE — PROGRESS NOTE ADULT - PROBLEM SELECTOR PLAN 3
- 1/31 UA+; f/up UCx  - Started CTX 1g QD x7d (1/31-2/06)
- 1/31 UA+; f/up UCx  - Continue CTX 1g QD x3d (1/31-2/2)

## 2024-02-02 NOTE — PROGRESS NOTE ADULT - PROBLEM SELECTOR PLAN 4
A1C 7.1%  -  Monitor FS while on ISS (Home: Metformin 1000mg BID)       - Fluids: not indicated  - Replete Lytes PRN to K>4, Mg>2  - Diet: DASH   - DVT ppx: SQH   - GI ppx: PPI   - Dispo: Recommended [facility] vs PT/OT at home
A1C 7.1%  -  Monitor FS while on ISS (Home: Metformin 1000mg BID)       - Fluids: not indicated  - Replete Lytes PRN to K>4, Mg>2  - Diet: DASH   - DVT ppx: SQH   - GI ppx: PPI   - Dispo: Dc to [facility] in AM    Case discussed with Dr. Hampton

## 2024-02-02 NOTE — PROGRESS NOTE ADULT - SUBJECTIVE AND OBJECTIVE BOX
Cardiology PA Adult Progress Note    Subjective Assessment:  Patient seen and examined at bedside this AM. Endorses feeling dizzy/lightheaded at times when walking and urinary frequency. Otherwise denies CP, SOB, palpitations, abd pain, N/V, s/s of bleeding, LE edema, dysuria, fever, chills, or cough.      ROS Negative except as per Subjective and HPI  	  MEDICATIONS:  losartan 50 milliGRAM(s) Oral daily  metoprolol succinate ER 25 milliGRAM(s) Oral two times a day  cefTRIAXone   IVPB 1000 milliGRAM(s) IV Intermittent every 24 hours   acetaminophen     Tablet .. 650 milliGRAM(s) Oral every 6 hours PRN  gabapentin 100 milliGRAM(s) Oral three times a day  pantoprazole    Tablet 40 milliGRAM(s) Oral before breakfast  dextrose 50% Injectable 12.5 Gram(s) IV Push once  dextrose 50% Injectable 25 Gram(s) IV Push once  dextrose 50% Injectable 25 Gram(s) IV Push once  dextrose Oral Gel 15 Gram(s) Oral once PRN  glucagon  Injectable 1 milliGRAM(s) IntraMuscular once  insulin lispro (ADMELOG) corrective regimen sliding scale   SubCutaneous Before meals and at bedtime  dextrose 5%. 1000 milliLiter(s) IV Continuous <Continuous>  dextrose 5%. 1000 milliLiter(s) IV Continuous <Continuous>  heparin   Injectable 5000 Unit(s) SubCutaneous every 12 hours  influenza  Vaccine (HIGH DOSE) 0.7 milliLiter(s) IntraMuscular once  lidocaine   4% Patch 1 Patch Transdermal daily  magnesium oxide 400 milliGRAM(s) Oral once      [PHYSICAL EXAM:  TELEMETRY:  T(C): 36.3 (02-01-24 @ 11:47), Max: 36.6 (02-01-24 @ 09:07)  HR: 62 (02-01-24 @ 11:47) (61 - 74)  BP: 140/61 (02-01-24 @ 11:47) (134/63 - 157/67)  RR: 18 (02-01-24 @ 11:47) (18 - 18)  SpO2: 100% (02-01-24 @ 11:47) (97% - 100%)  Wt(kg): --  I&O's Summary    31 Jan 2024 07:01  -  01 Feb 2024 07:00  --------------------------------------------------------  IN: 360 mL / OUT: 0 mL / NET: 360 mL    01 Feb 2024 07:01  -  01 Feb 2024 15:45  --------------------------------------------------------  IN: 525 mL / OUT: 550 mL / NET: -25 mL    Appearance: Elderly female, resting comfortably in bed   Cardiovascular: Normal S1 S2,  Respiratory: Lungs clear to auscultation/  Gastrointestinal:  Soft, Non-tender, + BS	  Skin: No rashes, No ecchymoses, No cyanosis  Extremities: Normal range of motion, No clubbing, cyanosis or edema  Vascular: Peripheral pulses palpable 2+ bilaterally  Neurologic: Non-focal  Psychiatry: A & O x 3, Mood & affect appropriate    RADIOLOGY:   CXR 01/30/24: Persistent cardiomegaly. Question new patchy airspace like opacities over the right upper lung zone versus compositional shadows. A   follow-up PA and lateral chest is suggested to confirm resolution.    DIAGNOSTIC TESTING:  [ x] Echocardiogram 01/31/24:   1. Normal left ventricular size and systolic function.   2. Normal right ventricular size and systolic function.   3. Normal atria.   4. Aortic sclerosis without significant stenosis.   5. No evidence of pulmonary hypertension.   6. No pericardial effusion.   7. No prior echo is available for comparison.    OTHER: 	    LABS:	 	  CARDIAC MARKERS:                  10.0   9.30  )-----------( 231      ( 01 Feb 2024 05:30 )             30.6     02-01    136  |  102  |  21  ----------------------------<  96  4.3   |  26  |  0.92    Ca    9.1      01 Feb 2024 05:30  Mg     1.9     02-01    TPro  5.6<L>  /  Alb  3.2<L>  /  TBili  0.3  /  DBili  x   /  AST  14  /  ALT  6<L>  /  AlkPhos  82  02-01    proBNP:   Lipid Profile:   HgA1c:   TSH:   PT/INR - ( 31 Jan 2024 05:30 )   PT: 10.8 sec;   INR: 0.95          PTT - ( 31 Jan 2024 05:30 )  PTT:29.7 sec    ASSESSMENT/PLAN: 	        DVT ppx:  Dispo:    
Cardiology PA Adult Progress Note    SUBJECTIVE ASSESSMENT: Met with and examined the pt at bedside this morning seen sitting comfortably in bed. She denies any complains this morning specifically any CP, SOB, n/v, dizziness, lightheadedness n/v, palpitations.   	  MEDICATIONS:  hydrochlorothiazide 12.5 milliGRAM(s) Oral daily  losartan 50 milliGRAM(s) Oral daily  metoprolol succinate ER 25 milliGRAM(s) Oral two times a day    cefTRIAXone   IVPB 1000 milliGRAM(s) IV Intermittent every 24 hours    acetaminophen     Tablet .. 650 milliGRAM(s) Oral every 6 hours PRN  gabapentin 100 milliGRAM(s) Oral three times a day    pantoprazole    Tablet 40 milliGRAM(s) Oral before breakfast    dextrose 50% Injectable 12.5 Gram(s) IV Push once  dextrose 50% Injectable 25 Gram(s) IV Push once  dextrose 50% Injectable 25 Gram(s) IV Push once  dextrose Oral Gel 15 Gram(s) Oral once PRN  glucagon  Injectable 1 milliGRAM(s) IntraMuscular once  insulin lispro (ADMELOG) corrective regimen sliding scale   SubCutaneous Before meals and at bedtime    dextrose 5%. 1000 milliLiter(s) IV Continuous <Continuous>  dextrose 5%. 1000 milliLiter(s) IV Continuous <Continuous>  heparin   Injectable 5000 Unit(s) SubCutaneous every 12 hours  influenza  Vaccine (HIGH DOSE) 0.7 milliLiter(s) IntraMuscular once  lidocaine   4% Patch 1 Patch Transdermal daily    	  VITAL SIGNS:  T(C): 36.6 (02-02-24 @ 06:09), Max: 36.6 (02-02-24 @ 06:09)  HR: 59 (02-02-24 @ 13:16) (54 - 63)  BP: 172/69 (02-02-24 @ 13:16) (148/65 - 172/69)  RR: 18 (02-02-24 @ 13:16) (18 - 18)  SpO2: 98% (02-02-24 @ 13:16) (96% - 100%)  Wt(kg): --    I&O's Summary    01 Feb 2024 07:01  -  02 Feb 2024 07:00  --------------------------------------------------------  IN: 525 mL / OUT: 550 mL / NET: -25 mL    02 Feb 2024 07:01  -  02 Feb 2024 14:35  --------------------------------------------------------  IN: 240 mL / OUT: 550 mL / NET: -310 mL                                         PHYSICAL EXAM:  Appearance: Normal	  HEENT: Normal oral mucosa, EOMI	  Neck: Supple, - JVD  Cardiovascular: Normal S1 S2, No murmurs  Respiratory: Lungs clear to auscultation. No Rales, Rhonchi, Wheezing	  Gastrointestinal:  Soft, Non-tender, + BS	  Skin: No rashes, No ecchymoses, No cyanosis  Extremities: Normal range of motion, No clubbing, cyanosis or edema  Vascular: Peripheral pulses palpable 2+ bilaterally  Neurologic: Non-focal  Psychiatry: A & O x 3, Mood & affect appropriate    LABS:	                         10.5   7.98  )-----------( 239      ( 02 Feb 2024 12:23 )             32.3     02-02    137  |  104  |  22  ----------------------------<  191<H>  4.5   |  24  |  0.96    Ca    8.9      02 Feb 2024 10:36  Mg     2.0     02-02    TPro  5.9<L>  /  Alb  3.4  /  TBili  0.3  /  DBili  x   /  AST  14  /  ALT  6<L>  /  AlkPhos  85  02-02

## 2024-02-03 ENCOUNTER — TRANSCRIPTION ENCOUNTER (OUTPATIENT)
Age: 89
End: 2024-02-03

## 2024-02-03 VITALS — SYSTOLIC BLOOD PRESSURE: 168 MMHG | HEART RATE: 61 BPM | DIASTOLIC BLOOD PRESSURE: 73 MMHG

## 2024-02-03 LAB
-  AMOXICILLIN/CLAVULANIC ACID: SIGNIFICANT CHANGE UP
-  AMPICILLIN/SULBACTAM: SIGNIFICANT CHANGE UP
-  AMPICILLIN: SIGNIFICANT CHANGE UP
-  AZTREONAM: SIGNIFICANT CHANGE UP
-  CEFAZOLIN: SIGNIFICANT CHANGE UP
-  CEFEPIME: SIGNIFICANT CHANGE UP
-  CEFOXITIN: SIGNIFICANT CHANGE UP
-  CEFTRIAXONE: SIGNIFICANT CHANGE UP
-  CEFUROXIME: SIGNIFICANT CHANGE UP
-  CIPROFLOXACIN: SIGNIFICANT CHANGE UP
-  ERTAPENEM: SIGNIFICANT CHANGE UP
-  GENTAMICIN: SIGNIFICANT CHANGE UP
-  IMIPENEM: SIGNIFICANT CHANGE UP
-  LEVOFLOXACIN: SIGNIFICANT CHANGE UP
-  MEROPENEM: SIGNIFICANT CHANGE UP
-  NITROFURANTOIN: SIGNIFICANT CHANGE UP
-  PIPERACILLIN/TAZOBACTAM: SIGNIFICANT CHANGE UP
-  TOBRAMYCIN: SIGNIFICANT CHANGE UP
-  TRIMETHOPRIM/SULFAMETHOXAZOLE: SIGNIFICANT CHANGE UP
ANION GAP SERPL CALC-SCNC: 9 MMOL/L — SIGNIFICANT CHANGE UP (ref 5–17)
BUN SERPL-MCNC: 18 MG/DL — SIGNIFICANT CHANGE UP (ref 7–23)
CALCIUM SERPL-MCNC: 9.1 MG/DL — SIGNIFICANT CHANGE UP (ref 8.4–10.5)
CHLORIDE SERPL-SCNC: 102 MMOL/L — SIGNIFICANT CHANGE UP (ref 96–108)
CO2 SERPL-SCNC: 27 MMOL/L — SIGNIFICANT CHANGE UP (ref 22–31)
CREAT SERPL-MCNC: 0.94 MG/DL — SIGNIFICANT CHANGE UP (ref 0.5–1.3)
CULTURE RESULTS: ABNORMAL
EGFR: 59 ML/MIN/1.73M2 — LOW
GLUCOSE BLDC GLUCOMTR-MCNC: 102 MG/DL — HIGH (ref 70–99)
GLUCOSE BLDC GLUCOMTR-MCNC: 78 MG/DL — SIGNIFICANT CHANGE UP (ref 70–99)
GLUCOSE BLDC GLUCOMTR-MCNC: 89 MG/DL — SIGNIFICANT CHANGE UP (ref 70–99)
GLUCOSE SERPL-MCNC: 108 MG/DL — HIGH (ref 70–99)
HCT VFR BLD CALC: 31.9 % — LOW (ref 34.5–45)
HGB BLD-MCNC: 10.4 G/DL — LOW (ref 11.5–15.5)
MAGNESIUM SERPL-MCNC: 2 MG/DL — SIGNIFICANT CHANGE UP (ref 1.6–2.6)
MCHC RBC-ENTMCNC: 28.3 PG — SIGNIFICANT CHANGE UP (ref 27–34)
MCHC RBC-ENTMCNC: 32.6 GM/DL — SIGNIFICANT CHANGE UP (ref 32–36)
MCV RBC AUTO: 86.7 FL — SIGNIFICANT CHANGE UP (ref 80–100)
METHOD TYPE: SIGNIFICANT CHANGE UP
NRBC # BLD: 0 /100 WBCS — SIGNIFICANT CHANGE UP (ref 0–0)
ORGANISM # SPEC MICROSCOPIC CNT: ABNORMAL
ORGANISM # SPEC MICROSCOPIC CNT: SIGNIFICANT CHANGE UP
PLATELET # BLD AUTO: 230 K/UL — SIGNIFICANT CHANGE UP (ref 150–400)
POTASSIUM SERPL-MCNC: 4.6 MMOL/L — SIGNIFICANT CHANGE UP (ref 3.5–5.3)
POTASSIUM SERPL-SCNC: 4.6 MMOL/L — SIGNIFICANT CHANGE UP (ref 3.5–5.3)
RBC # BLD: 3.68 M/UL — LOW (ref 3.8–5.2)
RBC # FLD: 17.1 % — HIGH (ref 10.3–14.5)
SODIUM SERPL-SCNC: 138 MMOL/L — SIGNIFICANT CHANGE UP (ref 135–145)
SPECIMEN SOURCE: SIGNIFICANT CHANGE UP
WBC # BLD: 6.98 K/UL — SIGNIFICANT CHANGE UP (ref 3.8–10.5)
WBC # FLD AUTO: 6.98 K/UL — SIGNIFICANT CHANGE UP (ref 3.8–10.5)

## 2024-02-03 PROCEDURE — 83735 ASSAY OF MAGNESIUM: CPT

## 2024-02-03 PROCEDURE — 81001 URINALYSIS AUTO W/SCOPE: CPT

## 2024-02-03 PROCEDURE — 83036 HEMOGLOBIN GLYCOSYLATED A1C: CPT

## 2024-02-03 PROCEDURE — 36415 COLL VENOUS BLD VENIPUNCTURE: CPT

## 2024-02-03 PROCEDURE — 87186 SC STD MICRODIL/AGAR DIL: CPT

## 2024-02-03 PROCEDURE — 80061 LIPID PANEL: CPT

## 2024-02-03 PROCEDURE — 85610 PROTHROMBIN TIME: CPT

## 2024-02-03 PROCEDURE — 97530 THERAPEUTIC ACTIVITIES: CPT

## 2024-02-03 PROCEDURE — 97165 OT EVAL LOW COMPLEX 30 MIN: CPT

## 2024-02-03 PROCEDURE — 85027 COMPLETE CBC AUTOMATED: CPT

## 2024-02-03 PROCEDURE — 93306 TTE W/DOPPLER COMPLETE: CPT

## 2024-02-03 PROCEDURE — 87086 URINE CULTURE/COLONY COUNT: CPT

## 2024-02-03 PROCEDURE — 82550 ASSAY OF CK (CPK): CPT

## 2024-02-03 PROCEDURE — 82553 CREATINE MB FRACTION: CPT

## 2024-02-03 PROCEDURE — 72170 X-RAY EXAM OF PELVIS: CPT

## 2024-02-03 PROCEDURE — 96374 THER/PROPH/DIAG INJ IV PUSH: CPT

## 2024-02-03 PROCEDURE — 71045 X-RAY EXAM CHEST 1 VIEW: CPT

## 2024-02-03 PROCEDURE — 84484 ASSAY OF TROPONIN QUANT: CPT

## 2024-02-03 PROCEDURE — 0225U NFCT DS DNA&RNA 21 SARSCOV2: CPT

## 2024-02-03 PROCEDURE — 84145 PROCALCITONIN (PCT): CPT

## 2024-02-03 PROCEDURE — 96375 TX/PRO/DX INJ NEW DRUG ADDON: CPT

## 2024-02-03 PROCEDURE — 97161 PT EVAL LOW COMPLEX 20 MIN: CPT

## 2024-02-03 PROCEDURE — 84443 ASSAY THYROID STIM HORMONE: CPT

## 2024-02-03 PROCEDURE — 87040 BLOOD CULTURE FOR BACTERIA: CPT

## 2024-02-03 PROCEDURE — 82248 BILIRUBIN DIRECT: CPT

## 2024-02-03 PROCEDURE — 87077 CULTURE AEROBIC IDENTIFY: CPT

## 2024-02-03 PROCEDURE — 83880 ASSAY OF NATRIURETIC PEPTIDE: CPT

## 2024-02-03 PROCEDURE — 85730 THROMBOPLASTIN TIME PARTIAL: CPT

## 2024-02-03 PROCEDURE — 83605 ASSAY OF LACTIC ACID: CPT

## 2024-02-03 PROCEDURE — 80048 BASIC METABOLIC PNL TOTAL CA: CPT

## 2024-02-03 PROCEDURE — 99285 EMERGENCY DEPT VISIT HI MDM: CPT | Mod: 25

## 2024-02-03 PROCEDURE — 82962 GLUCOSE BLOOD TEST: CPT

## 2024-02-03 PROCEDURE — 85025 COMPLETE CBC W/AUTO DIFF WBC: CPT

## 2024-02-03 PROCEDURE — 80053 COMPREHEN METABOLIC PANEL: CPT

## 2024-02-03 PROCEDURE — 97116 GAIT TRAINING THERAPY: CPT

## 2024-02-03 RX ORDER — HYDROCHLOROTHIAZIDE 25 MG
1 TABLET ORAL
Qty: 30 | Refills: 0
Start: 2024-02-03 | End: 2024-03-03

## 2024-02-03 RX ORDER — HYDRALAZINE HCL 50 MG
10 TABLET ORAL ONCE
Refills: 0 | Status: COMPLETED | OUTPATIENT
Start: 2024-02-03 | End: 2024-02-03

## 2024-02-03 RX ADMIN — Medication 10 MILLIGRAM(S): at 09:50

## 2024-02-03 RX ADMIN — HEPARIN SODIUM 5000 UNIT(S): 5000 INJECTION INTRAVENOUS; SUBCUTANEOUS at 06:28

## 2024-02-03 RX ADMIN — Medication 25 MILLIGRAM(S): at 06:28

## 2024-02-03 RX ADMIN — LOSARTAN POTASSIUM 50 MILLIGRAM(S): 100 TABLET, FILM COATED ORAL at 06:27

## 2024-02-03 RX ADMIN — PANTOPRAZOLE SODIUM 40 MILLIGRAM(S): 20 TABLET, DELAYED RELEASE ORAL at 06:28

## 2024-02-03 RX ADMIN — GABAPENTIN 100 MILLIGRAM(S): 400 CAPSULE ORAL at 06:28

## 2024-02-03 NOTE — DISCHARGE NOTE NURSING/CASE MANAGEMENT/SOCIAL WORK - PATIENT PORTAL LINK FT
You can access the FollowMyHealth Patient Portal offered by [facility] by registering at the following website: [email or web address]. By joining [employer]’s FollowMyHealth portal, you will also be able to view your health information using other applications (apps) compatible with our system.

## 2024-02-05 LAB
CULTURE RESULTS: SIGNIFICANT CHANGE UP
CULTURE RESULTS: SIGNIFICANT CHANGE UP
SPECIMEN SOURCE: SIGNIFICANT CHANGE UP
SPECIMEN SOURCE: SIGNIFICANT CHANGE UP

## 2024-02-07 DIAGNOSIS — D72.829 ELEVATED WHITE BLOOD CELL COUNT, UNSPECIFIED: ICD-10-CM

## 2024-02-07 DIAGNOSIS — I16.1 HYPERTENSIVE EMERGENCY: ICD-10-CM
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
## 2024-02-07 DIAGNOSIS — M19.90 UNSPECIFIED OSTEOARTHRITIS, UNSPECIFIED SITE: ICD-10-CM
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
## 2024-02-07 DIAGNOSIS — R00.0 TACHYCARDIA, UNSPECIFIED: ICD-10-CM

## 2024-02-07 DIAGNOSIS — Z79.84 LONG TERM (CURRENT) USE OF ORAL HYPOGLYCEMIC DRUGS: ICD-10-CM

## 2024-02-07 DIAGNOSIS — Z96.643 PRESENCE OF ARTIFICIAL HIP JOINT, BILATERAL: ICD-10-CM

## 2024-02-07 DIAGNOSIS — S60.572A OTHER SUPERFICIAL BITE OF HAND OF LEFT HAND, INITIAL ENCOUNTER: ICD-10-CM

## 2024-02-07 DIAGNOSIS — Z91.148 PATIENT'S OTHER NONCOMPLIANCE WITH MEDICATION REGIMEN FOR OTHER REASON: ICD-10-CM

## 2024-02-07 DIAGNOSIS — I10 ESSENTIAL (PRIMARY) HYPERTENSION: ICD-10-CM

## 2024-02-07 DIAGNOSIS — B96.1 KLEBSIELLA PNEUMONIAE [K. PNEUMONIAE] AS THE CAUSE OF DISEASES CLASSIFIED ELSEWHERE: ICD-10-CM

## 2024-02-07 DIAGNOSIS — G89.29 OTHER CHRONIC PAIN: ICD-10-CM

## 2024-02-07 DIAGNOSIS — E11.9 TYPE 2 DIABETES MELLITUS WITHOUT COMPLICATIONS: ICD-10-CM

## 2024-02-07 DIAGNOSIS — Z91.81 HISTORY OF FALLING: ICD-10-CM

## 2024-02-07 DIAGNOSIS — M54.9 DORSALGIA, UNSPECIFIED: ICD-10-CM
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
## 2024-02-07 DIAGNOSIS — R79.89 OTHER SPECIFIED ABNORMAL FINDINGS OF BLOOD CHEMISTRY: ICD-10-CM
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
## 2024-02-07 DIAGNOSIS — N39.0 URINARY TRACT INFECTION, SITE NOT SPECIFIED: ICD-10-CM

## 2024-02-20 ENCOUNTER — APPOINTMENT (OUTPATIENT)
Dept: INTERNAL MEDICINE | Facility: CLINIC | Age: 89
End: 2024-02-20

## 2024-03-18 NOTE — ED ADULT NURSE NOTE - NSFALLRSKASSESASSIST_ED_ALL_ED
Future Appointments   Date Time Provider Department Center   3/19/2024  1:00 PM Oscar Medina MD [place]  [place] [facility]   3/22/2024 10:20 AM Grabiel Armenta MD [facility]   3/29/2024 10:15 AM Yun Diaz DO [facility]   4/4/2024  9:15 AM Oscar Medina MD [place]  [place] - [facility]         Last appt 1/4/24   no

## 2024-06-12 NOTE — PRE-OP CHECKLIST - SELECT TESTS ORDERED
Patient with vague symptoms of left arm pain and weakness as well as diminished sensation to left arm face and leg.  NIH stroke scale 2.  Code stroke initiated will do CAT scan labs reassess.  Patient passed dysphagia eval.
BMP/EKG/CBC/PT/PTT/INR

## 2025-06-01 NOTE — H&P ADULT - REASON FOR ADMISSION
Invega 12 mg at bedtime for psychosis and mood symptoms.  AIMS of 1 on today's assessment.  Artane 2 mg twice daily for EPS/TD.  Hemoglobin A1c noted to be 5.7 as of 4/7/2025, lipid panel unremarkable as of 5/12/2025.  EKG on 5/6/2025 reveals normal sinus rhythm with PVCs and PACs, QTc of 480.  Lithium 600 mg twice daily for mood stabilization.  Lithium level noted to be therapeutic at 0.73 as of 5/30/2025.  EGFR noted to be 91 as of 5/6/2025.  TSH noted to be 1.939 as of 5/6/2025.    No associated orders from this encounter found during lookback period of 72 hours.     fall, left hip fracture

## 2025-07-29 NOTE — CONSULT NOTE ADULT - SUBJECTIVE AND OBJECTIVE BOX
I CALLED AND SPOKE TO PT.     I RELAYED OF PROVIDERS MESSAGE VERBATIM.     PT EXPRESSED UNDERSTANDING AND HAD NO FURTHER QUESTIONS.    NO FURTHER ACTION IS NEEDED AT THIS TIME.     Patient is a 88y old  Female who presents with a chief complaint of fall, left hip fracture (15 May 2023 08:14)      HPI:  89 y/o M F with PMH of DM, HTN, lumbar compression fracture, and b/l hip replacements presenting with left hip pain after a fall. Patient states she was going into the lobby of her building and the door was heavy and it swung back and hit her which caused her to fall backwards on her left side. She since then has had severe left hip pain. She denies LOC, head trauma, or chest pain/palpitations at the time of the event. She has hx of mechanical falls, she has previous hip fracture with prosthesis in place. She also has hx of L2 compression fracture hx along with osteonecrosis and was due to have a cortisone injection outpatient but did not make it to her appointment.     At this time she denies HA, dizziness, neck pain, chest pain, dyspnea, abd pain, N/V/D, Reports left hip pain, no pain on right hip or other joint pain.     Patient admitted for left periprosthetic fracture of the proximal left femur.     (12 May 2023 12:46)    PAST MEDICAL & SURGICAL HISTORY:  Diabetes      Hypertension      History of bilateral hip arthroplasty        MEDICATIONS  (STANDING):  cyclobenzaprine 5 milliGRAM(s) Oral three times a day  dextrose 5%. 1000 milliLiter(s) (50 mL/Hr) IV Continuous <Continuous>  dextrose 5%. 1000 milliLiter(s) (100 mL/Hr) IV Continuous <Continuous>  dextrose 50% Injectable 25 Gram(s) IV Push once  dextrose 50% Injectable 25 Gram(s) IV Push once  dextrose 50% Injectable 12.5 Gram(s) IV Push once  enoxaparin Injectable 40 milliGRAM(s) SubCutaneous every 24 hours  gabapentin 100 milliGRAM(s) Oral three times a day  glucagon  Injectable 1 milliGRAM(s) IntraMuscular once  insulin lispro (ADMELOG) corrective regimen sliding scale   SubCutaneous three times a day before meals  lidocaine   4% Patch 1 Patch Transdermal every 24 hours  pantoprazole    Tablet 40 milliGRAM(s) Oral before breakfast    MEDICATIONS  (PRN):  acetaminophen     Tablet .. 650 milliGRAM(s) Oral every 6 hours PRN Temp greater or equal to 38C (100.4F), Mild Pain (1 - 3)  dextrose Oral Gel 15 Gram(s) Oral once PRN Blood Glucose LESS THAN 70 milliGRAM(s)/deciliter  ketorolac   Injectable 15 milliGRAM(s) IV Push every 8 hours PRN Moderate Pain (4 - 6)  oxyCODONE    IR 5 milliGRAM(s) Oral every 6 hours PRN Severe Pain (7 - 10)          Social History:              -  Home Living Status :  lives          -  Prior Home Care Services :  5 hrs  x 5 days/week     Baseline Functional Level Prior to Admission :             - ADL's/ IADL's :  independent          - Ambulatory status prior to admission :   walked with a rollator         FAMILY HISTORY:      CBC Full  -  ( 14 May 2023 11:11 )  WBC Count : 9.38 K/uL  RBC Count : 3.27 M/uL  Hemoglobin : 9.7 g/dL  Hematocrit : 29.9 %  Platelet Count - Automated : 253 K/uL  Mean Cell Volume : 91.4 fl  Mean Cell Hemoglobin : 29.7 pg  Mean Cell Hemoglobin Concentration : 32.4 gm/dL  Auto Neutrophil # : x  Auto Lymphocyte # : x  Auto Monocyte # : x  Auto Eosinophil # : x  Auto Basophil # : x  Auto Neutrophil % : x  Auto Lymphocyte % : x  Auto Monocyte % : x  Auto Eosinophil % : x  Auto Basophil % : x      05-14    139  |  102  |  19  ----------------------------<  123<H>  3.9   |  27  |  0.74    Ca    9.3      14 May 2023 11:11  Phos  3.6     05-14  Mg     1.9     05-14            Radiology :     < from: Xray Femur 2 Views, Left (05.11.23 @ 23:17) >  ACC: [id number] EXAM:  XR HIP WITH PELV 2-3V LT   ORDERED BY: DEVON GUILLAUME     ACC: [id number] EXAM:  XR FEMUR 2 VIEWS LT   ORDERED BY: DEVON GUILLAUME     ACC: [id number] EXAM:  XR KNEE 3 VIEWS LT   ORDERED BY: DEVON GUILLAUME     PROCEDURE DATE:  05/11/2023          INTERPRETATION:  EXAMINATION: XR KNEE 3 VIEWS LEFT, XR FEMUR 2 VIEWS   LEFT, XR HIP WITH PELVIS 2 OR 3 VIEWS LEFT    CLINICAL INDICATION:Left lower extremity pain status post fall.    COMPARISON: CT of the left hip from the same day, 5/12/2023. CT of the   pelvis from 11/19/2022. Radiographs of the left hip and femur 7/14/2017.    TECHNIQUE: One view of the left knee. 2 views of the left femur were   obtained.    INTERPRETATION:  LEFT HIP/FEMUR:  Left hip arthroplasty is intact. There is an acute minimally displaced   periprosthetic fracture involving the shaft of the proximal femur along   the femoral stem component of the hip arthroplasty. Cortical step-off and   a fracture lucency is evident.    LEFT KNEE:  No acute fracture or dislocation visualized on single view of the left   knee. There is quadriceps enthesopathy. No large joint effusion.   Atherosclerotic vascular changes.    IMPRESSION:  Acute periprosthetic fracture of the proximal left femoral shaft along   the arthroplasty stem. See CT report.                REVIEW OF SYSTEMS:      CONSTITUTIONAL: No fever, weight loss, or fatigue  EYES: No eye pain, visual disturbances, or discharge  ENMT:  No difficulty hearing, tinnitus, vertigo; No sinus or throat pain  NECK: No pain or stiffness  BREASTS: No pain, masses, or nipple discharge  RESPIRATORY: No cough, wheezing, chills or hemoptysis; No shortness of breath  CARDIOVASCULAR: No chest pain, palpitations, dizziness, or leg swelling  GASTROINTESTINAL: No abdominal or epigastric pain. No nausea, vomiting, or hematemesis; No diarrhea or constipation. No melena or hematochezia.  GENITOURINARY: No dysuria, frequency, hematuria, or incontinence  NEUROLOGICAL: No headaches, memory loss, loss of strength, numbness, or tremors  SKIN: No itching, burning, rashes, or lesions   LYMPH NODES: No enlarged glands  ENDOCRINE: No heat or cold intolerance; No hair loss  MUSCULOSKELETAL:  fall with L hip pain   PSYCHIATRIC: No depression, anxiety, mood swings, or difficulty sleeping  HEME/LYMPH: No easy bruising, or bleeding gums  ALLERGY AND IMMUNOLOGIC: No hives or eczema  VASCULAR: no swelling , erythema        Vital Signs Last 24 Hrs  T(C): 36.5 (15 May 2023 09:11), Max: 36.8 (14 May 2023 15:32)  T(F): 97.7 (15 May 2023 09:11), Max: 98.2 (14 May 2023 15:32)  HR: 75 (15 May 2023 09:11) (75 - 96)  BP: 134/59 (15 May 2023 09:11) (134/59 - 158/75)  BP(mean): --  RR: 18 (15 May 2023 09:11) (18 - 20)  SpO2: 98% (15 May 2023 09:11) (97% - 98%)    Parameters below as of 15 May 2023 09:11  Patient On (Oxygen Delivery Method): room air            Physical Exam :   88 y o woman  lying comfortably in semi Maciel's position , awake , alert , no acute complaints     Head : normocephalic , atraumatic    Eyes : PERRLA , EOMI , no nystagmus , sclera anicteric    ENT : neg nasal discharge , uvula midline , no oropharyngeal erythema / exudate    Neck : supple , negative JVD , negative carotid bruits , no thyromegaly    Chest : CTA bilaterally , neg wheeze / rhonchi / rales / crackles / egophany    Cardiovascular : regular rate and rhythm , neg murmurs / rubs / gallops    Abdomen : soft , non distended , non tender to palpation in all 4 quadrants ,  normal bowel sounds     Extremities : WWP , neg cyanosis /clubbing / edema      Musculoskeletal : L hip/pelvis ttp, limited hip and knee flexion sec to pain          Neurologic Exam :    Alert and oriented      Cranial Nerves:     II :                         pupils equal , round and reactive to light , visual fields intact   III/ IV/VI :              extraocular movements intact , neg nystagmus , neg ptosis  V :                        facial sensation intact , V1-3 normal  VII :                      face symmetric , no droop , normal eye closure and smile  VIII :                     hearing intact to finger rub bilaterally  IX and X :             no hoarseness , gag intact , palate/ uvula rise symmetrically  XI :                       SCM / trapezius strength intact bilateral  XII :                      no tongue deviation    Motor Exam:          Right UE:               no focal weakness ,  > 3+/5 throughout      Left UE:                 no focal weakness ,  > 3+/5 throughout         Right LE:    no focal weakness ,  > 3+/5 throughout        Left LE:  proximal- pain limited, distally > 4/5       Sensation :         intact to light touch x 4 extremities                                DTR :     biceps/brachioradialis : equal                      patella/ankle : equal      Gait :  not tested          PM&R Impression :     s/p fall with L periprosthetic fx     -TTWB L LE with AD         Recommendations / Plan :            1) Physical / Occupational therapy focusing on therapeutic exercises , equipment evaluation , bed mobility/transfer out of bed evaluation , progressive ambulation with assistive devices prn .    2) Current disposition plan recommendation  :      subacute rehab placement